# Patient Record
Sex: MALE | Race: WHITE | NOT HISPANIC OR LATINO | Employment: OTHER | URBAN - METROPOLITAN AREA
[De-identification: names, ages, dates, MRNs, and addresses within clinical notes are randomized per-mention and may not be internally consistent; named-entity substitution may affect disease eponyms.]

---

## 2019-07-15 ENCOUNTER — HOSPITAL ENCOUNTER (OUTPATIENT)
Dept: FAMILY MEDICINE CLINIC | Facility: CLINIC | Age: 59
Discharge: HOME | End: 2019-07-15
Attending: NURSE PRACTITIONER

## 2019-07-15 ENCOUNTER — OFFICE VISIT CONVERTED (OUTPATIENT)
Dept: FAMILY MEDICINE CLINIC | Facility: CLINIC | Age: 59
End: 2019-07-15
Attending: NURSE PRACTITIONER

## 2019-07-16 LAB
ALBUMIN SERPL-MCNC: 4.3 G/DL (ref 3.5–5)
ALBUMIN/GLOB SERPL: 1.7 {RATIO} (ref 1.4–2.6)
ALP SERPL-CCNC: 60 U/L (ref 56–119)
ALT SERPL-CCNC: 11 U/L (ref 10–40)
ANION GAP SERPL CALC-SCNC: 18 MMOL/L (ref 8–19)
AST SERPL-CCNC: 14 U/L (ref 15–50)
BASOPHILS # BLD AUTO: 0.03 10*3/UL (ref 0–0.2)
BASOPHILS NFR BLD AUTO: 0.4 % (ref 0–3)
BILIRUB SERPL-MCNC: 0.28 MG/DL (ref 0.2–1.3)
BUN SERPL-MCNC: 17 MG/DL (ref 5–25)
BUN/CREAT SERPL: 19 {RATIO} (ref 6–20)
CALCIUM SERPL-MCNC: 9 MG/DL (ref 8.7–10.4)
CHLORIDE SERPL-SCNC: 102 MMOL/L (ref 99–111)
CONV ABS IMM GRAN: 0.01 10*3/UL (ref 0–0.2)
CONV CO2: 26 MMOL/L (ref 22–32)
CONV IMMATURE GRAN: 0.1 % (ref 0–1.8)
CONV TOTAL PROTEIN: 6.9 G/DL (ref 6.3–8.2)
CREAT UR-MCNC: 0.9 MG/DL (ref 0.7–1.2)
DEPRECATED RDW RBC AUTO: 40.5 FL (ref 35.1–43.9)
EOSINOPHIL # BLD AUTO: 0.27 10*3/UL (ref 0–0.7)
EOSINOPHIL # BLD AUTO: 4 % (ref 0–7)
ERYTHROCYTE [DISTWIDTH] IN BLOOD BY AUTOMATED COUNT: 11.9 % (ref 11.6–14.4)
FOLATE SERPL-MCNC: >20 NG/ML (ref 4.8–20)
GFR SERPLBLD BASED ON 1.73 SQ M-ARVRAT: >60 ML/MIN/{1.73_M2}
GLOBULIN UR ELPH-MCNC: 2.6 G/DL (ref 2–3.5)
GLUCOSE SERPL-MCNC: 92 MG/DL (ref 70–99)
HBA1C MFR BLD: 13.4 G/DL (ref 14–18)
HCT VFR BLD AUTO: 41.1 % (ref 42–52)
LYMPHOCYTES # BLD AUTO: 2.35 10*3/UL (ref 1–5)
MCH RBC QN AUTO: 30.1 PG (ref 27–31)
MCHC RBC AUTO-ENTMCNC: 32.6 G/DL (ref 33–37)
MCV RBC AUTO: 92.4 FL (ref 80–96)
MONOCYTES # BLD AUTO: 0.45 10*3/UL (ref 0.2–1.2)
MONOCYTES NFR BLD AUTO: 6.6 % (ref 3–10)
NEUTROPHILS # BLD AUTO: 3.69 10*3/UL (ref 2–8)
NEUTROPHILS NFR BLD AUTO: 54.3 % (ref 30–85)
NRBC CBCN: 0 % (ref 0–0.7)
OSMOLALITY SERPL CALC.SUM OF ELEC: 293 MOSM/KG (ref 273–304)
PLATELET # BLD AUTO: 239 10*3/UL (ref 130–400)
PMV BLD AUTO: 12.1 FL (ref 9.4–12.4)
POTASSIUM SERPL-SCNC: 4.7 MMOL/L (ref 3.5–5.3)
RBC # BLD AUTO: 4.45 10*6/UL (ref 4.7–6.1)
SODIUM SERPL-SCNC: 141 MMOL/L (ref 135–147)
T4 FREE SERPL-MCNC: 1.4 NG/DL (ref 0.9–1.8)
TSH SERPL-ACNC: 1.98 M[IU]/L (ref 0.27–4.2)
VARIANT LYMPHS NFR BLD MANUAL: 34.6 % (ref 20–45)
VIT B12 SERPL-MCNC: 978 PG/ML (ref 211–911)
WBC # BLD AUTO: 6.8 10*3/UL (ref 4.8–10.8)

## 2019-07-17 LAB — B BURGDOR IGG+IGM SER-ACNC: <0.91 ISR (ref 0–0.9)

## 2019-07-18 LAB
R RICKETTSI IGG SER QL IA: NORMAL
R RICKETTSI IGG SER QL IA: POSITIVE

## 2019-07-19 LAB — R RICKETTSI IGM TITR SER: 0.33 INDEX (ref 0–0.89)

## 2019-07-21 LAB
B MICROTI DNA BLD QL NAA+PROBE: NOT DETECTED
CONV ANAPLASMA PHAGOCYTOPHILUM PCR: NOT DETECTED
CONV BABESIA SPECIES PCR: NOT DETECTED
CONV EHRLICHIA EWINGII CANIS PCR: NOT DETECTED
CONV EHRLICHIA MURIS LIKE PCR: NOT DETECTED
E CHAFFEENSIS DNA BLD QL NAA+PROBE: NOT DETECTED

## 2019-09-25 ENCOUNTER — HOSPITAL ENCOUNTER (OUTPATIENT)
Dept: FAMILY MEDICINE CLINIC | Facility: CLINIC | Age: 59
Discharge: HOME | End: 2019-09-25
Attending: NURSE PRACTITIONER

## 2019-09-25 ENCOUNTER — OFFICE VISIT CONVERTED (OUTPATIENT)
Dept: FAMILY MEDICINE CLINIC | Facility: CLINIC | Age: 59
End: 2019-09-25
Attending: NURSE PRACTITIONER

## 2019-09-25 LAB
25(OH)D3 SERPL-MCNC: 52.8 NG/ML (ref 30–100)
ALBUMIN SERPL-MCNC: 5 G/DL (ref 3.5–5)
ALBUMIN/GLOB SERPL: 1.7 {RATIO} (ref 1.4–2.6)
ALP SERPL-CCNC: 72 U/L (ref 56–119)
ALT SERPL-CCNC: 16 U/L (ref 10–40)
ANION GAP SERPL CALC-SCNC: 24 MMOL/L (ref 8–19)
AST SERPL-CCNC: 20 U/L (ref 15–50)
BASOPHILS # BLD AUTO: 0.03 10*3/UL (ref 0–0.2)
BASOPHILS NFR BLD AUTO: 0.4 % (ref 0–3)
BILIRUB SERPL-MCNC: 0.33 MG/DL (ref 0.2–1.3)
BUN SERPL-MCNC: 14 MG/DL (ref 5–25)
BUN/CREAT SERPL: 11 {RATIO} (ref 6–20)
CALCIUM SERPL-MCNC: 10.1 MG/DL (ref 8.7–10.4)
CHLORIDE SERPL-SCNC: 100 MMOL/L (ref 99–111)
CONV ABS IMM GRAN: 0.01 10*3/UL (ref 0–0.2)
CONV CO2: 20 MMOL/L (ref 22–32)
CONV IMMATURE GRAN: 0.1 % (ref 0–1.8)
CONV RHEUMATOID FACTOR IGM: <10 [IU]/ML (ref 0–14)
CONV TOTAL PROTEIN: 7.9 G/DL (ref 6.3–8.2)
CREAT UR-MCNC: 1.25 MG/DL (ref 0.7–1.2)
DEPRECATED RDW RBC AUTO: 37.5 FL (ref 35.1–43.9)
EOSINOPHIL # BLD AUTO: 0.13 10*3/UL (ref 0–0.7)
EOSINOPHIL # BLD AUTO: 1.9 % (ref 0–7)
ERYTHROCYTE [DISTWIDTH] IN BLOOD BY AUTOMATED COUNT: 11.5 % (ref 11.6–14.4)
EST. AVERAGE GLUCOSE BLD GHB EST-MCNC: 108 MG/DL
FERRITIN SERPL-MCNC: 249 NG/ML (ref 30–300)
FOLATE SERPL-MCNC: >20 NG/ML (ref 4.8–20)
GFR SERPLBLD BASED ON 1.73 SQ M-ARVRAT: >60 ML/MIN/{1.73_M2}
GLOBULIN UR ELPH-MCNC: 2.9 G/DL (ref 2–3.5)
GLUCOSE SERPL-MCNC: 100 MG/DL (ref 70–99)
HBA1C MFR BLD: 5.4 % (ref 3.5–5.7)
HCT VFR BLD AUTO: 43.4 % (ref 42–52)
HGB BLD-MCNC: 14.4 G/DL (ref 14–18)
IRON SATN MFR SERPL: 30 % (ref 20–55)
IRON SERPL-MCNC: 108 UG/DL (ref 70–180)
LYMPHOCYTES # BLD AUTO: 2.06 10*3/UL (ref 1–5)
LYMPHOCYTES NFR BLD AUTO: 29.4 % (ref 20–45)
MCH RBC QN AUTO: 29.7 PG (ref 27–31)
MCHC RBC AUTO-ENTMCNC: 33.2 G/DL (ref 33–37)
MCV RBC AUTO: 89.5 FL (ref 80–96)
MONOCYTES # BLD AUTO: 0.35 10*3/UL (ref 0.2–1.2)
MONOCYTES NFR BLD AUTO: 5 % (ref 3–10)
NEUTROPHILS # BLD AUTO: 4.43 10*3/UL (ref 2–8)
NEUTROPHILS NFR BLD AUTO: 63.2 % (ref 30–85)
NRBC CBCN: 0 % (ref 0–0.7)
OSMOLALITY SERPL CALC.SUM OF ELEC: 289 MOSM/KG (ref 273–304)
PLATELET # BLD AUTO: 255 10*3/UL (ref 130–400)
PMV BLD AUTO: 11.5 FL (ref 9.4–12.4)
POTASSIUM SERPL-SCNC: 4.8 MMOL/L (ref 3.5–5.3)
PSA SERPL-MCNC: 0.65 NG/ML (ref 0–4)
RBC # BLD AUTO: 4.85 10*6/UL (ref 4.7–6.1)
SODIUM SERPL-SCNC: 139 MMOL/L (ref 135–147)
TESTOST SERPL-MCNC: 456 NG/DL (ref 193–740)
TIBC SERPL-MCNC: 356 UG/DL (ref 245–450)
TRANSFERRIN SERPL-MCNC: 249 MG/DL (ref 215–365)
TSH SERPL-ACNC: 1.95 M[IU]/L (ref 0.27–4.2)
VIT B12 SERPL-MCNC: 798 PG/ML (ref 211–911)
WBC # BLD AUTO: 7.01 10*3/UL (ref 4.8–10.8)

## 2019-09-26 LAB
CONV ANTI NUCLEAR ANTIBODY WITH REFLEX: NEGATIVE
HCV AB SER DONR QL: <0.1 S/CO RATIO (ref 0–0.9)

## 2019-09-28 LAB
CONV EBV EARLY ANTIGEN: 10.9 U/ML (ref 0–10.9)
CONV EBV NUCLEAR ANTIGEN: >600 U/ML (ref 0–21.9)
CONV EPSTEIN BARR VIRAL CAPSID IGM: <10 U/ML (ref 0–43.9)
CONV EPSTEIN BARR VIRUS ANTIBODY TO VIRAL CAPSID IGG: 320 U/ML (ref 0–21.9)

## 2019-09-30 ENCOUNTER — HOSPITAL ENCOUNTER (OUTPATIENT)
Dept: FAMILY MEDICINE CLINIC | Facility: CLINIC | Age: 59
Discharge: HOME | End: 2019-09-30
Attending: NURSE PRACTITIONER

## 2019-09-30 ENCOUNTER — OFFICE VISIT CONVERTED (OUTPATIENT)
Dept: FAMILY MEDICINE CLINIC | Facility: CLINIC | Age: 59
End: 2019-09-30
Attending: NURSE PRACTITIONER

## 2019-09-30 LAB — URATE SERPL-MCNC: 6.5 MG/DL (ref 3.5–8.5)

## 2019-10-11 ENCOUNTER — HOSPITAL ENCOUNTER (OUTPATIENT)
Dept: LAB | Facility: HOSPITAL | Age: 59
Discharge: HOME | End: 2019-10-11
Attending: SPECIALIST

## 2019-10-11 LAB
ALBUMIN SERPL-MCNC: 4.6 G/DL (ref 3.5–5)
ALBUMIN/GLOB SERPL: 1.8 {RATIO} (ref 1.4–2.6)
ALP SERPL-CCNC: 63 U/L (ref 56–119)
ALT SERPL-CCNC: 15 U/L (ref 10–40)
ANION GAP SERPL CALC-SCNC: 17 MMOL/L (ref 8–19)
AST SERPL-CCNC: 17 U/L (ref 15–50)
BILIRUB SERPL-MCNC: 0.6 MG/DL (ref 0.2–1.3)
BUN SERPL-MCNC: 13 MG/DL (ref 5–25)
BUN/CREAT SERPL: 13 {RATIO} (ref 6–20)
CALCIUM SERPL-MCNC: 9.6 MG/DL (ref 8.7–10.4)
CHLORIDE SERPL-SCNC: 103 MMOL/L (ref 99–111)
CHOLEST SERPL-MCNC: 159 MG/DL (ref 107–200)
CHOLEST/HDLC SERPL: 2.9 {RATIO} (ref 3–6)
CONV CO2: 25 MMOL/L (ref 22–32)
CONV TOTAL PROTEIN: 7.2 G/DL (ref 6.3–8.2)
CREAT UR-MCNC: 0.99 MG/DL (ref 0.7–1.2)
GFR SERPLBLD BASED ON 1.73 SQ M-ARVRAT: >60 ML/MIN/{1.73_M2}
GLOBULIN UR ELPH-MCNC: 2.6 G/DL (ref 2–3.5)
GLUCOSE SERPL-MCNC: 97 MG/DL (ref 70–99)
HDLC SERPL-MCNC: 54 MG/DL (ref 40–60)
LDLC SERPL CALC-MCNC: 87 MG/DL (ref 70–100)
OSMOLALITY SERPL CALC.SUM OF ELEC: 290 MOSM/KG (ref 273–304)
POTASSIUM SERPL-SCNC: 4.7 MMOL/L (ref 3.5–5.3)
SODIUM SERPL-SCNC: 140 MMOL/L (ref 135–147)
TRIGL SERPL-MCNC: 88 MG/DL (ref 40–150)
VLDLC SERPL-MCNC: 18 MG/DL (ref 5–37)

## 2019-10-24 ENCOUNTER — OFFICE VISIT CONVERTED (OUTPATIENT)
Dept: FAMILY MEDICINE CLINIC | Facility: CLINIC | Age: 59
End: 2019-10-24
Attending: NURSE PRACTITIONER

## 2021-05-09 VITALS
OXYGEN SATURATION: 96 % | BODY MASS INDEX: 26.44 KG/M2 | HEIGHT: 66 IN | WEIGHT: 164.5 LBS | HEART RATE: 91 BPM | SYSTOLIC BLOOD PRESSURE: 132 MMHG | TEMPERATURE: 98.2 F | DIASTOLIC BLOOD PRESSURE: 84 MMHG

## 2021-05-09 VITALS
OXYGEN SATURATION: 100 % | WEIGHT: 168.06 LBS | HEART RATE: 77 BPM | DIASTOLIC BLOOD PRESSURE: 70 MMHG | BODY MASS INDEX: 27.01 KG/M2 | SYSTOLIC BLOOD PRESSURE: 140 MMHG | TEMPERATURE: 97.8 F | HEIGHT: 66 IN

## 2021-05-09 VITALS
DIASTOLIC BLOOD PRESSURE: 80 MMHG | HEART RATE: 88 BPM | SYSTOLIC BLOOD PRESSURE: 120 MMHG | OXYGEN SATURATION: 95 % | TEMPERATURE: 98.2 F | WEIGHT: 166 LBS

## 2021-05-09 VITALS
TEMPERATURE: 97.7 F | SYSTOLIC BLOOD PRESSURE: 138 MMHG | OXYGEN SATURATION: 97 % | HEART RATE: 83 BPM | WEIGHT: 168.31 LBS | DIASTOLIC BLOOD PRESSURE: 86 MMHG

## 2022-11-03 PROCEDURE — 99284 EMERGENCY DEPT VISIT MOD MDM: CPT

## 2022-11-04 ENCOUNTER — HOSPITAL ENCOUNTER (EMERGENCY)
Facility: HOSPITAL | Age: 62
Discharge: HOME | End: 2022-11-04
Attending: EMERGENCY MEDICINE | Admitting: EMERGENCY MEDICINE

## 2022-11-04 VITALS
WEIGHT: 168.87 LBS | RESPIRATION RATE: 18 BRPM | OXYGEN SATURATION: 96 % | TEMPERATURE: 98.1 F | DIASTOLIC BLOOD PRESSURE: 89 MMHG | BODY MASS INDEX: 25.01 KG/M2 | HEART RATE: 85 BPM | SYSTOLIC BLOOD PRESSURE: 151 MMHG | HEIGHT: 69 IN

## 2022-11-04 DIAGNOSIS — M54.31 SCIATICA OF RIGHT SIDE: Primary | ICD-10-CM

## 2022-11-04 LAB
ALBUMIN SERPL-MCNC: 4.8 G/DL (ref 3.5–5.2)
ALBUMIN/GLOB SERPL: 1.7 G/DL
ALP SERPL-CCNC: 65 U/L (ref 39–117)
ALT SERPL W P-5'-P-CCNC: 15 U/L (ref 1–41)
ANION GAP SERPL CALCULATED.3IONS-SCNC: 12.6 MMOL/L (ref 5–15)
AST SERPL-CCNC: 18 U/L (ref 1–40)
BASOPHILS # BLD AUTO: 0.02 10*3/MM3 (ref 0–0.2)
BASOPHILS NFR BLD AUTO: 0.3 % (ref 0–1.5)
BILIRUB SERPL-MCNC: 0.4 MG/DL (ref 0–1.2)
BUN SERPL-MCNC: 13 MG/DL (ref 8–23)
BUN/CREAT SERPL: 11 (ref 7–25)
CALCIUM SPEC-SCNC: 9.4 MG/DL (ref 8.6–10.5)
CHLORIDE SERPL-SCNC: 102 MMOL/L (ref 98–107)
CO2 SERPL-SCNC: 21.4 MMOL/L (ref 22–29)
CREAT SERPL-MCNC: 1.18 MG/DL (ref 0.76–1.27)
DEPRECATED RDW RBC AUTO: 36.2 FL (ref 37–54)
EGFRCR SERPLBLD CKD-EPI 2021: 70.2 ML/MIN/1.73
EOSINOPHIL # BLD AUTO: 0.11 10*3/MM3 (ref 0–0.4)
EOSINOPHIL NFR BLD AUTO: 1.6 % (ref 0.3–6.2)
ERYTHROCYTE [DISTWIDTH] IN BLOOD BY AUTOMATED COUNT: 11.7 % (ref 12.3–15.4)
GLOBULIN UR ELPH-MCNC: 2.8 GM/DL
GLUCOSE SERPL-MCNC: 117 MG/DL (ref 65–99)
HCT VFR BLD AUTO: 39.4 % (ref 37.5–51)
HGB BLD-MCNC: 13.9 G/DL (ref 13–17.7)
IMM GRANULOCYTES # BLD AUTO: 0.02 10*3/MM3 (ref 0–0.05)
IMM GRANULOCYTES NFR BLD AUTO: 0.3 % (ref 0–0.5)
LYMPHOCYTES # BLD AUTO: 1.66 10*3/MM3 (ref 0.7–3.1)
LYMPHOCYTES NFR BLD AUTO: 24 % (ref 19.6–45.3)
MCH RBC QN AUTO: 30.2 PG (ref 26.6–33)
MCHC RBC AUTO-ENTMCNC: 35.3 G/DL (ref 31.5–35.7)
MCV RBC AUTO: 85.7 FL (ref 79–97)
MONOCYTES # BLD AUTO: 0.33 10*3/MM3 (ref 0.1–0.9)
MONOCYTES NFR BLD AUTO: 4.8 % (ref 5–12)
NEUTROPHILS NFR BLD AUTO: 4.79 10*3/MM3 (ref 1.7–7)
NEUTROPHILS NFR BLD AUTO: 69 % (ref 42.7–76)
NRBC BLD AUTO-RTO: 0 /100 WBC (ref 0–0.2)
PLATELET # BLD AUTO: 276 10*3/MM3 (ref 140–450)
PMV BLD AUTO: 10.2 FL (ref 6–12)
POTASSIUM SERPL-SCNC: 4 MMOL/L (ref 3.5–5.2)
PROT SERPL-MCNC: 7.6 G/DL (ref 6–8.5)
RBC # BLD AUTO: 4.6 10*6/MM3 (ref 4.14–5.8)
SODIUM SERPL-SCNC: 136 MMOL/L (ref 136–145)
WBC NRBC COR # BLD: 6.93 10*3/MM3 (ref 3.4–10.8)

## 2022-11-04 PROCEDURE — 80053 COMPREHEN METABOLIC PANEL: CPT | Performed by: EMERGENCY MEDICINE

## 2022-11-04 PROCEDURE — 36415 COLL VENOUS BLD VENIPUNCTURE: CPT | Performed by: EMERGENCY MEDICINE

## 2022-11-04 PROCEDURE — 85025 COMPLETE CBC W/AUTO DIFF WBC: CPT

## 2022-11-04 RX ORDER — HYDROCODONE BITARTRATE AND ACETAMINOPHEN 7.5; 325 MG/1; MG/1
1 TABLET ORAL EVERY 4 HOURS PRN
Qty: 12 TABLET | Refills: 0 | OUTPATIENT
Start: 2022-11-04 | End: 2023-01-07

## 2022-11-04 RX ORDER — HYDROCODONE BITARTRATE AND ACETAMINOPHEN 7.5; 325 MG/1; MG/1
1 TABLET ORAL ONCE
Status: COMPLETED | OUTPATIENT
Start: 2022-11-04 | End: 2022-11-04

## 2022-11-04 RX ORDER — PREDNISONE 50 MG/1
50 TABLET ORAL DAILY
Qty: 5 TABLET | Refills: 0 | Status: SHIPPED | OUTPATIENT
Start: 2022-11-04 | End: 2023-01-19

## 2022-11-04 RX ORDER — ASPIRIN 325 MG
325 TABLET ORAL DAILY
COMMUNITY
End: 2022-11-07

## 2022-11-04 RX ADMIN — HYDROCODONE BITARTRATE AND ACETAMINOPHEN 1 TABLET: 7.5; 325 TABLET ORAL at 03:05

## 2022-11-04 NOTE — ED PROVIDER NOTES
Time: 1:53 AM EDT    Chief Complaint: leg pain    History of Present Illness:  Patient is a 62 y.o. year old male that presents to the emergency department with leg pain.     Pt has had back pain that radiates down his Left and Right leg for 2 weeks. He says he cannot sleep and has trouble walking. He says coldness helps his pain.     Pt is Right handed. He also has a laceration on his Left index finger that happened today. Pt put Betadine, antibiotic cream, and a bandaid on the wound.     Pt has been seen in the ED in [place] on 10/24 and 10/26. Pt has taken Ibuprofen since.    He has not been diagnosed with sciatica before. Pt has a history of anxiety. Pt has a history of leg problems in both of his legs. Pt has history of staph infections.           Similar Symptoms Previously: No  Recently seen: No      Patient Care Team  Primary Care Provider: Valeria Avendano APRN    Past Medical History:     No Known Allergies  No past medical history on file.  No past surgical history on file.  No family history on file.    Home Medications:  Prior to Admission medications    Medication Sig Start Date End Date Taking? Authorizing Provider   aspirin 325 MG tablet Take 1 tablet by mouth Daily.    Provider, MD Santi        Social History:        Record Review:  I have reviewed the patient's records in [facility] [employer].     Review of Systems:  Review of Systems   Constitutional: Negative for chills and fever.   HENT: Negative for congestion, rhinorrhea and sore throat.    Eyes: Negative for pain and visual disturbance.   Respiratory: Negative for apnea, cough, chest tightness and shortness of breath.    Cardiovascular: Negative for chest pain and palpitations.   Gastrointestinal: Negative for abdominal pain, diarrhea, nausea and vomiting.   Genitourinary: Negative for difficulty urinating and dysuria.   Musculoskeletal: Positive for back pain and gait problem. Negative for joint swelling.   Skin: Positive for wound. Negative for  "color change.   Neurological: Negative for seizures and headaches.   Psychiatric/Behavioral: Positive for sleep disturbance.   All other systems reviewed and are negative.       Physical Exam:  /89 (Patient Position: Sitting)   Pulse 85   Temp 98.1 °F (36.7 °C) (Oral)   Resp 18   Ht 175.3 cm (69\")   Wt 76.6 kg (168 lb 14 oz)   SpO2 96%   BMI 24.94 kg/m²     Physical Exam  Vitals and nursing note reviewed.   Constitutional:       General: He is not in acute distress.     Appearance: Normal appearance. He is not toxic-appearing.   HENT:      Head: Normocephalic and atraumatic.      Jaw: There is normal jaw occlusion.   Eyes:      General: Lids are normal.      Extraocular Movements: Extraocular movements intact.      Conjunctiva/sclera: Conjunctivae normal.      Pupils: Pupils are equal, round, and reactive to light.   Cardiovascular:      Rate and Rhythm: Normal rate and regular rhythm.      Pulses: Normal pulses.      Heart sounds: Normal heart sounds.   Pulmonary:      Effort: Pulmonary effort is normal. No respiratory distress.      Breath sounds: Normal breath sounds. No wheezing or rhonchi.   Abdominal:      General: Abdomen is flat.      Palpations: Abdomen is soft.      Tenderness: There is no abdominal tenderness. There is no guarding or rebound.   Musculoskeletal:         General: Normal range of motion.      Left hand: Laceration present.      Cervical back: Normal range of motion and neck supple.      Right lower leg: No edema.      Left lower leg: No edema.      Comments: Pt is Right handed. Pt has small proximated laceration to his Left index finger.   Skin:     General: Skin is warm and dry.   Neurological:      Mental Status: He is alert and oriented to person, place, and time. Mental status is at baseline.   Psychiatric:         Mood and Affect: Mood normal.                    Medications in the Emergency Department:  Medications   HYDROcodone-acetaminophen (NORCO) 7.5-325 MG per tablet 1 " tablet (1 tablet Oral Given 11/4/22 0305)        Labs  Lab Results (last 24 hours)     Procedure Component Value Units Date/Time    CBC & Differential [[id number]]  (Abnormal) Collected: 11/04/22 0028    Specimen: Blood Updated: 11/04/22 0051    Narrative:      The following orders were created for panel order CBC & Differential.  Procedure                               Abnormality         Status                     ---------                               -----------         ------                     CBC Auto Differential[[id number]]        Abnormal            Final result                 Please view results for these tests on the individual orders.    Comprehensive Metabolic Panel [[id number]]  (Abnormal) Collected: 11/04/22 0028    Specimen: Blood Updated: 11/04/22 0109     Glucose 117 mg/dL      BUN 13 mg/dL      Creatinine 1.18 mg/dL      Sodium 136 mmol/L      Potassium 4.0 mmol/L      Chloride 102 mmol/L      CO2 21.4 mmol/L      Calcium 9.4 mg/dL      Total Protein 7.6 g/dL      Albumin 4.80 g/dL      ALT (SGPT) 15 U/L      AST (SGOT) 18 U/L      Alkaline Phosphatase 65 U/L      Total Bilirubin 0.4 mg/dL      Globulin 2.8 gm/dL      A/G Ratio 1.7 g/dL      BUN/Creatinine Ratio 11.0     Anion Gap 12.6 mmol/L      eGFR 70.2 mL/min/1.73      Comment: National Kidney Foundation and American Society of Nephrology (ASN) Task Force recommended calculation based on the Chronic Kidney Disease Epidemiology Collaboration (CKD-EPI) equation refit without adjustment for race.       Narrative:      GFR Normal >60  Chronic Kidney Disease <60  Kidney Failure <15      CBC Auto Differential [[id number]]  (Abnormal) Collected: 11/04/22 0028    Specimen: Blood Updated: 11/04/22 0051     WBC 6.93 10*3/mm3      RBC 4.60 10*6/mm3      Hemoglobin 13.9 g/dL      Hematocrit 39.4 %      MCV 85.7 fL      MCH 30.2 pg      MCHC 35.3 g/dL      RDW 11.7 %      RDW-SD 36.2 fl      MPV 10.2 fL      Platelets 276 10*3/mm3      Neutrophil % 69.0 %       Lymphocyte % 24.0 %      Monocyte % 4.8 %      Eosinophil % 1.6 %      Basophil % 0.3 %      Immature Grans % 0.3 %      Neutrophils, Absolute 4.79 10*3/mm3      Lymphocytes, Absolute 1.66 10*3/mm3      Monocytes, Absolute 0.33 10*3/mm3      Eosinophils, Absolute 0.11 10*3/mm3      Basophils, Absolute 0.02 10*3/mm3      Immature Grans, Absolute 0.02 10*3/mm3      nRBC 0.0 /100 WBC            Imaging:  No Radiology Exams Resulted Within Past 24 Hours    Procedures:  Laceration Repair    Date/Time: 11/4/2022 6:51 AM  Performed by: Howie Hamilton MD  Authorized by: Howie Hamilton MD     Consent:     Consent obtained:  Verbal    Consent given by:  Patient  Universal protocol:     Patient identity confirmed:  Verbally with patient  Anesthesia:     Anesthesia method:  None  Laceration details:     Location:  Finger    Finger location:  L index finger    Length (cm):  2  Pre-procedure details:     Preparation:  Patient was prepped and draped in usual sterile fashion  Treatment:     Irrigation solution:  Sterile saline    Irrigation method:  Pressure wash    Visualized foreign bodies/material removed: no      Debridement:  None    Undermining:  None    Scar revision: no    Skin repair:     Repair method:  Steri-Strips    Number of Steri-Strips:  3  Approximation:     Approximation:  Close  Repair type:     Repair type:  Simple  Post-procedure details:     Dressing:  Open (no dressing)    Procedure completion:  Tolerated well, no immediate complications        Progress                            Medical Decision Making:  MDM     Patient's right leg pain most likely related to sciatic nerve compression.  We discussed use of anti-inflammatories pain medication.  We discussed return precautions including worsening symptoms or any additional concerns.    Final diagnoses:   Sciatica of right side        Disposition:  ED Disposition     ED Disposition   Discharge    Condition   Stable    Comment   --             Dictated  Utilizing Dragon Dictation    Documentation assistance provided by James Castellanos acting as scribe for Howie Hamilton MD. Information recorded by the scribe was done at my direction and has been verified and validated by me.        James Castellanos  11/04/22 0220       Howei Hamilton MD  11/04/22 2037

## 2022-11-05 ENCOUNTER — APPOINTMENT (OUTPATIENT)
Dept: GENERAL RADIOLOGY | Facility: HOSPITAL | Age: 62
End: 2022-11-05

## 2022-11-05 ENCOUNTER — HOSPITAL ENCOUNTER (EMERGENCY)
Facility: HOSPITAL | Age: 62
Discharge: HOME | End: 2022-11-05
Attending: EMERGENCY MEDICINE | Admitting: EMERGENCY MEDICINE

## 2022-11-05 VITALS
HEIGHT: 69 IN | TEMPERATURE: 98.7 F | RESPIRATION RATE: 20 BRPM | BODY MASS INDEX: 24.88 KG/M2 | DIASTOLIC BLOOD PRESSURE: 92 MMHG | SYSTOLIC BLOOD PRESSURE: 139 MMHG | OXYGEN SATURATION: 94 % | WEIGHT: 168 LBS | HEART RATE: 89 BPM

## 2022-11-05 DIAGNOSIS — K29.70 GASTRITIS WITHOUT BLEEDING, UNSPECIFIED CHRONICITY, UNSPECIFIED GASTRITIS TYPE: Primary | ICD-10-CM

## 2022-11-05 LAB
ALBUMIN SERPL-MCNC: 4.9 G/DL (ref 3.5–5.2)
ALBUMIN/GLOB SERPL: 2.1 G/DL
ALP SERPL-CCNC: 59 U/L (ref 39–117)
ALT SERPL W P-5'-P-CCNC: 18 U/L (ref 1–41)
ANION GAP SERPL CALCULATED.3IONS-SCNC: 10.7 MMOL/L (ref 5–15)
AST SERPL-CCNC: 17 U/L (ref 1–40)
BASOPHILS # BLD AUTO: 0.02 10*3/MM3 (ref 0–0.2)
BASOPHILS NFR BLD AUTO: 0.2 % (ref 0–1.5)
BILIRUB SERPL-MCNC: 0.4 MG/DL (ref 0–1.2)
BUN SERPL-MCNC: 9 MG/DL (ref 8–23)
BUN/CREAT SERPL: 8.2 (ref 7–25)
CALCIUM SPEC-SCNC: 10 MG/DL (ref 8.6–10.5)
CHLORIDE SERPL-SCNC: 103 MMOL/L (ref 98–107)
CO2 SERPL-SCNC: 26.3 MMOL/L (ref 22–29)
CREAT SERPL-MCNC: 1.1 MG/DL (ref 0.76–1.27)
DEPRECATED RDW RBC AUTO: 38.5 FL (ref 37–54)
EGFRCR SERPLBLD CKD-EPI 2021: 76.4 ML/MIN/1.73
EOSINOPHIL # BLD AUTO: 0.05 10*3/MM3 (ref 0–0.4)
EOSINOPHIL NFR BLD AUTO: 0.6 % (ref 0.3–6.2)
ERYTHROCYTE [DISTWIDTH] IN BLOOD BY AUTOMATED COUNT: 11.9 % (ref 12.3–15.4)
GLOBULIN UR ELPH-MCNC: 2.3 GM/DL
GLUCOSE SERPL-MCNC: 108 MG/DL (ref 65–99)
HCT VFR BLD AUTO: 40.4 % (ref 37.5–51)
HGB BLD-MCNC: 14 G/DL (ref 13–17.7)
HOLD SPECIMEN: NORMAL
HOLD SPECIMEN: NORMAL
IMM GRANULOCYTES # BLD AUTO: 0.02 10*3/MM3 (ref 0–0.05)
IMM GRANULOCYTES NFR BLD AUTO: 0.2 % (ref 0–0.5)
LIPASE SERPL-CCNC: 25 U/L (ref 13–60)
LYMPHOCYTES # BLD AUTO: 1.95 10*3/MM3 (ref 0.7–3.1)
LYMPHOCYTES NFR BLD AUTO: 23.9 % (ref 19.6–45.3)
MAGNESIUM SERPL-MCNC: 2 MG/DL (ref 1.6–2.4)
MCH RBC QN AUTO: 30.6 PG (ref 26.6–33)
MCHC RBC AUTO-ENTMCNC: 34.7 G/DL (ref 31.5–35.7)
MCV RBC AUTO: 88.4 FL (ref 79–97)
MONOCYTES # BLD AUTO: 0.63 10*3/MM3 (ref 0.1–0.9)
MONOCYTES NFR BLD AUTO: 7.7 % (ref 5–12)
NEUTROPHILS NFR BLD AUTO: 5.5 10*3/MM3 (ref 1.7–7)
NEUTROPHILS NFR BLD AUTO: 67.4 % (ref 42.7–76)
NRBC BLD AUTO-RTO: 0 /100 WBC (ref 0–0.2)
NT-PROBNP SERPL-MCNC: <36 PG/ML (ref 0–900)
PLATELET # BLD AUTO: 285 10*3/MM3 (ref 140–450)
PMV BLD AUTO: 10 FL (ref 6–12)
POTASSIUM SERPL-SCNC: 3.6 MMOL/L (ref 3.5–5.2)
PROT SERPL-MCNC: 7.2 G/DL (ref 6–8.5)
RBC # BLD AUTO: 4.57 10*6/MM3 (ref 4.14–5.8)
SODIUM SERPL-SCNC: 140 MMOL/L (ref 136–145)
TROPONIN I SERPL-MCNC: 0 NG/ML (ref 0–0.08)
TROPONIN I SERPL-MCNC: 0 NG/ML (ref 0–0.08)
WBC NRBC COR # BLD: 8.17 10*3/MM3 (ref 3.4–10.8)
WHOLE BLOOD HOLD COAG: NORMAL
WHOLE BLOOD HOLD SPECIMEN: NORMAL

## 2022-11-05 PROCEDURE — 83690 ASSAY OF LIPASE: CPT | Performed by: EMERGENCY MEDICINE

## 2022-11-05 PROCEDURE — 93010 ELECTROCARDIOGRAM REPORT: CPT | Performed by: INTERNAL MEDICINE

## 2022-11-05 PROCEDURE — 93005 ELECTROCARDIOGRAM TRACING: CPT | Performed by: EMERGENCY MEDICINE

## 2022-11-05 PROCEDURE — 85025 COMPLETE CBC W/AUTO DIFF WBC: CPT | Performed by: EMERGENCY MEDICINE

## 2022-11-05 PROCEDURE — 83735 ASSAY OF MAGNESIUM: CPT | Performed by: EMERGENCY MEDICINE

## 2022-11-05 PROCEDURE — 99284 EMERGENCY DEPT VISIT MOD MDM: CPT

## 2022-11-05 PROCEDURE — 74019 RADEX ABDOMEN 2 VIEWS: CPT

## 2022-11-05 PROCEDURE — 71045 X-RAY EXAM CHEST 1 VIEW: CPT

## 2022-11-05 PROCEDURE — 36415 COLL VENOUS BLD VENIPUNCTURE: CPT

## 2022-11-05 PROCEDURE — 84484 ASSAY OF TROPONIN QUANT: CPT

## 2022-11-05 PROCEDURE — 80053 COMPREHEN METABOLIC PANEL: CPT | Performed by: EMERGENCY MEDICINE

## 2022-11-05 PROCEDURE — 83880 ASSAY OF NATRIURETIC PEPTIDE: CPT | Performed by: EMERGENCY MEDICINE

## 2022-11-05 RX ORDER — PANTOPRAZOLE SODIUM 40 MG/1
40 TABLET, DELAYED RELEASE ORAL DAILY
Qty: 30 TABLET | Refills: 0 | Status: SHIPPED | OUTPATIENT
Start: 2022-11-05 | End: 2023-01-13 | Stop reason: SDUPTHER

## 2022-11-05 RX ORDER — SODIUM CHLORIDE 0.9 % (FLUSH) 0.9 %
10 SYRINGE (ML) INJECTION AS NEEDED
Status: DISCONTINUED | OUTPATIENT
Start: 2022-11-05 | End: 2022-11-05 | Stop reason: HOSPADM

## 2022-11-05 RX ORDER — ASPIRIN 81 MG/1
324 TABLET, CHEWABLE ORAL ONCE
Status: DISCONTINUED | OUTPATIENT
Start: 2022-11-05 | End: 2022-11-05 | Stop reason: HOSPADM

## 2022-11-05 RX ORDER — SULFAMETHOXAZOLE AND TRIMETHOPRIM 800; 160 MG/1; MG/1
1 TABLET ORAL 2 TIMES DAILY
Qty: 10 TABLET | Refills: 0 | Status: SHIPPED | OUTPATIENT
Start: 2022-11-05 | End: 2022-11-11 | Stop reason: SDUPTHER

## 2022-11-05 NOTE — ED PROVIDER NOTES
"Time: 11:06 AM EDT    Chief Complaint: Chest Pain  History provided by: Patient  History is limited by: N/A     History of Present Illness:  Patient is a 62 y.o. year old male who presents to the emergency department with chest pain.      Pt reports his chest pain began this morning around 3:25am, and the symptoms are intermittent. Pt states he also felt \"whoozy\".  Pt denies h/o heart problems, and last checked by his cardiologist in 2019.  Pt states he also had stomach has issues last night, including belching excessively and some mild abdominal discomfort.   Pt denied symptom of vomiting, but notes he has been constipated the last few days.  Pt reports he also has groin pain.       History provided by:  Patient   used: No    Chest Pain  Pain severity:  Moderate  Onset quality:  Sudden  Duration:  8 hours  Timing:  Intermittent  Progression:  Unchanged  Chronicity:  New  Associated symptoms: no abdominal pain, no cough, no fever, no headache, no nausea, no palpitations, no shortness of breath and no vomiting        Similar Symptoms Previously: No  Recently seen: Yes, 11/4/22      Patient Care Team  Primary Care Provider: Valeria Avendano APRN    Past Medical History:     No Known Allergies  No past medical history on file.  No past surgical history on file.  No family history on file.    Home Medications:  Prior to Admission medications    Medication Sig Start Date End Date Taking? Authorizing Provider   aspirin 325 MG tablet Take 1 tablet by mouth Daily.    Provider, MD Santi   HYDROcodone-acetaminophen (NORCO) 7.5-325 MG per tablet Take 1 tablet by mouth Every 4 (Four) Hours As Needed for Moderate Pain. 11/4/22   Howie Hamilton MD   predniSONE (DELTASONE) 50 MG tablet Take 1 tablet by mouth Daily. 11/4/22   Howie Hamilton MD        Social History:          Review of Systems:  Review of Systems   Constitutional: Negative for chills and fever.   HENT: Negative for congestion, " "rhinorrhea and sore throat.    Eyes: Negative for pain and visual disturbance.   Respiratory: Negative for apnea, cough, chest tightness and shortness of breath.    Cardiovascular: Positive for chest pain. Negative for palpitations.   Gastrointestinal: Positive for constipation. Negative for abdominal pain, diarrhea, nausea and vomiting.        Excessive belching   Genitourinary: Negative for difficulty urinating and dysuria.   Musculoskeletal: Negative for joint swelling and myalgias.   Skin: Negative for color change.   Neurological: Positive for light-headedness. Negative for seizures and headaches.   Psychiatric/Behavioral: Negative.    All other systems reviewed and are negative.       Physical Exam:  /95   Pulse 89   Temp 98.7 °F (37.1 °C) (Oral)   Resp 20   Ht 175.3 cm (69\")   Wt 76.2 kg (168 lb)   SpO2 94%   BMI 24.81 kg/m²     Physical Exam  Vitals and nursing note reviewed.   Constitutional:       General: He is not in acute distress.     Appearance: Normal appearance. He is not toxic-appearing.   HENT:      Head: Normocephalic and atraumatic.      Jaw: There is normal jaw occlusion.   Eyes:      General: Lids are normal.      Extraocular Movements: Extraocular movements intact.      Conjunctiva/sclera: Conjunctivae normal.      Pupils: Pupils are equal, round, and reactive to light.   Cardiovascular:      Rate and Rhythm: Normal rate and regular rhythm.      Pulses: Normal pulses.      Heart sounds: Normal heart sounds.   Pulmonary:      Effort: Pulmonary effort is normal. No respiratory distress.      Breath sounds: Normal breath sounds. No wheezing or rhonchi.   Abdominal:      General: Abdomen is flat.      Palpations: Abdomen is soft.      Tenderness: There is no abdominal tenderness. There is no guarding or rebound.   Musculoskeletal:         General: Normal range of motion.      Cervical back: Normal range of motion and neck supple.      Right lower leg: No edema.      Left lower leg: " No edema.   Skin:     General: Skin is warm and dry.   Neurological:      Mental Status: He is alert and oriented to person, place, and time. Mental status is at baseline.   Psychiatric:         Mood and Affect: Mood normal.                Medications in the Emergency Department:  Medications   sodium chloride 0.9 % flush 10 mL (has no administration in time range)   aspirin chewable tablet 324 mg (324 mg Oral Not Given 11/5/22 1316)        Labs  Lab Results (last 24 hours)     Procedure Component Value Units Date/Time    POC Troponin I [[id number]]  (Normal) Collected: 11/05/22 1102    Specimen: Blood Updated: 11/05/22 1114     Troponin I 0.00 ng/mL      Comment: Serial Number: [id number]:  [id number]       CBC & Differential [[id number]]  (Abnormal) Collected: 11/05/22 1104    Specimen: Blood Updated: 11/05/22 1118    Narrative:      The following orders were created for panel order CBC & Differential.  Procedure                               Abnormality         Status                     ---------                               -----------         ------                     CBC Auto Differential[[id number]]        Abnormal            Final result                 Please view results for these tests on the individual orders.    Comprehensive Metabolic Panel [[id number]]  (Abnormal) Collected: 11/05/22 1104    Specimen: Blood Updated: 11/05/22 1158     Glucose 108 mg/dL      BUN 9 mg/dL      Creatinine 1.10 mg/dL      Sodium 140 mmol/L      Potassium 3.6 mmol/L      Chloride 103 mmol/L      CO2 26.3 mmol/L      Calcium 10.0 mg/dL      Total Protein 7.2 g/dL      Albumin 4.90 g/dL      ALT (SGPT) 18 U/L      AST (SGOT) 17 U/L      Alkaline Phosphatase 59 U/L      Total Bilirubin 0.4 mg/dL      Globulin 2.3 gm/dL      A/G Ratio 2.1 g/dL      BUN/Creatinine Ratio 8.2     Anion Gap 10.7 mmol/L      eGFR 76.4 mL/min/1.73      Comment: National Kidney Foundation and American Society of Nephrology (ASN) Task Force recommended  calculation based on the Chronic Kidney Disease Epidemiology Collaboration (CKD-EPI) equation refit without adjustment for race.       Narrative:      GFR Normal >60  Chronic Kidney Disease <60  Kidney Failure <15      Lipase [[id number]]  (Normal) Collected: 11/05/22 1104    Specimen: Blood Updated: 11/05/22 1158     Lipase 25 U/L     BNP [[id number]]  (Normal) Collected: 11/05/22 1104    Specimen: Blood Updated: 11/05/22 1152     proBNP <36.0 pg/mL     Narrative:      Among patients with dyspnea, NT-proBNP is highly sensitive for the detection of acute congestive heart failure. In addition NT-proBNP of <300 pg/ml effectively rules out acute congestive heart failure with 99% negative predictive value.      Magnesium [[id number]]  (Normal) Collected: 11/05/22 1104    Specimen: Blood Updated: 11/05/22 1158     Magnesium 2.0 mg/dL     CBC Auto Differential [[id number]]  (Abnormal) Collected: 11/05/22 1104    Specimen: Blood Updated: 11/05/22 1118     WBC 8.17 10*3/mm3      RBC 4.57 10*6/mm3      Hemoglobin 14.0 g/dL      Hematocrit 40.4 %      MCV 88.4 fL      MCH 30.6 pg      MCHC 34.7 g/dL      RDW 11.9 %      RDW-SD 38.5 fl      MPV 10.0 fL      Platelets 285 10*3/mm3      Neutrophil % 67.4 %      Lymphocyte % 23.9 %      Monocyte % 7.7 %      Eosinophil % 0.6 %      Basophil % 0.2 %      Immature Grans % 0.2 %      Neutrophils, Absolute 5.50 10*3/mm3      Lymphocytes, Absolute 1.95 10*3/mm3      Monocytes, Absolute 0.63 10*3/mm3      Eosinophils, Absolute 0.05 10*3/mm3      Basophils, Absolute 0.02 10*3/mm3      Immature Grans, Absolute 0.02 10*3/mm3      nRBC 0.0 /100 WBC     POC Troponin I [[id number]]  (Normal) Collected: 11/05/22 1323    Specimen: Blood Updated: 11/05/22 1336     Troponin I 0.00 ng/mL      Comment: Serial Number: [id number]:  [id number]              Imaging:  XR Abdomen Flat & Upright    Result Date: 11/5/2022  PROCEDURE: XR ABDOMEN FLAT AND UPRIGHT  COMPARISON: None  INDICATIONS: LOWER ABDOMINAL  PAIN/BURNING, CONSTIPATION  FINDINGS:  There is no evidence of pneumoperitoneum.  The bowel gas pattern appears within normal limits.  No significant retained stool is seen.  No renal or ureteral stone is evident.          1. No acute abdominal pathology demonstrated     Nilson Sandoval M.D.       Electronically Signed and Approved By: Nilson Sandoval M.D. on 11/05/2022 at 13:12             XR Chest 1 View    Result Date: 11/5/2022  PROCEDURE: XR CHEST 1 VW  COMPARISON: None  INDICATIONS: Chest Pain Triage Protocol, soa, left arm numbness  FINDINGS:  The lungs are clear bilaterally.  The cardiac and mediastinal silhouettes appear normal.  No effusion is seen.        1. No acute cardiopulmonary disease.       Nilson Sandoval M.D.       Electronically Signed and Approved By: Nilson Sandoval M.D. on 11/05/2022 at 11:23               Procedures:  Procedures    Progress  ED Course as of 11/05/22 1411   Sat Nov 05, 2022   1409 EKG:    Rhythm: sinus  Rate: 91  Axis: normal  Intervals: normal  ST Segment: no elevations    EKG Comparison: no previous    Interpreted by me   [BN]      ED Course User Index  [BN] Alis Aguayo MD                            Medical Decision Making:  MDM  Number of Diagnoses or Management Options  Gastritis without bleeding, unspecified chronicity, unspecified gastritis type  Diagnosis management comments: The patient had an EKG that shows no acute changes. Specifically, there are no ST elevations, t-wave changes of concern, delta waves, or rhythm abnormalities warranting admission. The patient was placed on the cardiac monitor and observed with continuous telemetry. The patient has a chest x-ray interpreted by me that is negative for pneumothorax, pneumonia, and is essentially unremarkable. The patient has had unelevated troponins on blood draw.      The patient is resting comfortably and feels better, is alert and in no distress.  The patient´s CBC that was reviewed and interpreted by me shows no  abnormalities of critical concern. Of note, there is no anemia requiring a blood transfusion and the platelet count is acceptable.  The patient´s CMP that was reviewed and interpretted by me shows no abnormalities of critical concern. Of note, the patient´s sodium and potassium are acceptable. The patient´s liver enzymes are unremarkable. The patient´s renal function (creatinine) is preserved. The patient has a normal anion gap.  Troponin is negative.  X-ray of the abdomen is negative for air-fluid levels and dilated bowel.  Chest x-ray is negative for pneumonia.  All plain films were interpreted by me.  The repeat examination is unremarkable and benign. Electrocardiogram shows no signs of acute ischemia and the history, exam, diagnostic testing and current condition did not suggest that this patient is having an acute myocardial infarction, significant arrhythmia, unstable angina, esophageal perforation, pulmonary embolism, aortic dissection, severe pneumonia, sepsis for other significant pathology that would warrant further testing, continued ED treatment, admission, cardiology or other specialist consultation at this point. The vital signs have been stable. The patient's condition is stable and appropriate for discharge. The patient will pursue further outpatient evaluation with the primary care physician, or designated physician or cardiologist. The patient has expressed a clear and thorough understanding and agreed to follow-up as instructed.       Amount and/or Complexity of Data Reviewed  Clinical lab tests: reviewed  Tests in the radiology section of CPT®: reviewed  Tests in the medicine section of CPT®: reviewed  Independent visualization of images, tracings, or specimens: yes    Risk of Complications, Morbidity, and/or Mortality  Presenting problems: moderate  Management options: moderate    Patient Progress  Patient progress: stable       Final diagnoses:   Gastritis without bleeding, unspecified  chronicity, unspecified gastritis type        Disposition:  ED Disposition     ED Disposition   Discharge    Condition   Stable    Comment   --             This medical record created using voice recognition software.        Documentation assistance provided by Keysha Valles acting as scribe for Alis Aguayo MD. Information recorded by the scribe was done at my direction and has been verified and validated by me.          Keysha Valles  11/05/22 1120       Alis Aguayo MD  11/05/22 1412

## 2022-11-06 LAB — QT INTERVAL: 325 MS

## 2022-11-07 ENCOUNTER — OFFICE VISIT (OUTPATIENT)
Dept: FAMILY MEDICINE CLINIC | Facility: CLINIC | Age: 62
End: 2022-11-07

## 2022-11-07 VITALS
DIASTOLIC BLOOD PRESSURE: 84 MMHG | BODY MASS INDEX: 24.73 KG/M2 | HEART RATE: 88 BPM | WEIGHT: 167 LBS | HEIGHT: 69 IN | SYSTOLIC BLOOD PRESSURE: 144 MMHG | TEMPERATURE: 97.5 F | OXYGEN SATURATION: 96 %

## 2022-11-07 DIAGNOSIS — M54.16 LUMBAR RADICULOPATHY: ICD-10-CM

## 2022-11-07 DIAGNOSIS — Z09 ENCOUNTER FOR EXAMINATION FOLLOWING TREATMENT AT HOSPITAL: ICD-10-CM

## 2022-11-07 DIAGNOSIS — Z12.11 ENCOUNTER FOR SCREENING COLONOSCOPY: ICD-10-CM

## 2022-11-07 DIAGNOSIS — G89.29 CHRONIC LOW BACK PAIN, UNSPECIFIED BACK PAIN LATERALITY, UNSPECIFIED WHETHER SCIATICA PRESENT: ICD-10-CM

## 2022-11-07 DIAGNOSIS — K59.00 CONSTIPATION, UNSPECIFIED CONSTIPATION TYPE: ICD-10-CM

## 2022-11-07 DIAGNOSIS — E73.9 LACTOSE INTOLERANCE: ICD-10-CM

## 2022-11-07 DIAGNOSIS — Z76.89 ENCOUNTER TO ESTABLISH CARE: Primary | ICD-10-CM

## 2022-11-07 DIAGNOSIS — Z23 NEED FOR INFLUENZA VACCINATION: ICD-10-CM

## 2022-11-07 DIAGNOSIS — M54.50 CHRONIC LOW BACK PAIN, UNSPECIFIED BACK PAIN LATERALITY, UNSPECIFIED WHETHER SCIATICA PRESENT: ICD-10-CM

## 2022-11-07 DIAGNOSIS — M41.9 SCOLIOSIS OF LUMBAR SPINE, UNSPECIFIED SCOLIOSIS TYPE: ICD-10-CM

## 2022-11-07 PROCEDURE — 86003 ALLG SPEC IGE CRUDE XTRC EA: CPT | Performed by: NURSE PRACTITIONER

## 2022-11-07 PROCEDURE — 90686 IIV4 VACC NO PRSV 0.5 ML IM: CPT | Performed by: NURSE PRACTITIONER

## 2022-11-07 PROCEDURE — 86008 ALLG SPEC IGE RECOMB EA: CPT | Performed by: NURSE PRACTITIONER

## 2022-11-07 PROCEDURE — 90471 IMMUNIZATION ADMIN: CPT | Performed by: NURSE PRACTITIONER

## 2022-11-07 PROCEDURE — 99204 OFFICE O/P NEW MOD 45 MIN: CPT | Performed by: NURSE PRACTITIONER

## 2022-11-07 RX ORDER — BACITRACIN ZINC 500 [USP'U]/G
OINTMENT TOPICAL
COMMUNITY
Start: 2022-10-25 | End: 2023-03-07

## 2022-11-07 RX ORDER — GINSENG 100 MG
CAPSULE ORAL
COMMUNITY
Start: 2022-10-25 | End: 2022-11-07

## 2022-11-07 RX ORDER — IBUPROFEN 400 MG/1
TABLET ORAL
COMMUNITY
Start: 2022-10-25 | End: 2023-01-13

## 2022-11-07 RX ORDER — TIZANIDINE HYDROCHLORIDE 4 MG/1
4 CAPSULE, GELATIN COATED ORAL NIGHTLY PRN
Qty: 30 CAPSULE | Refills: 2 | Status: SHIPPED | OUTPATIENT
Start: 2022-11-07 | End: 2023-01-13

## 2022-11-07 RX ORDER — ACETAMINOPHEN 325 MG/1
TABLET ORAL
COMMUNITY
Start: 2022-10-25 | End: 2023-03-07

## 2022-11-07 RX ORDER — POLYETHYLENE GLYCOL 3350 17 G/17G
17 POWDER, FOR SOLUTION ORAL DAILY
Qty: 30 EACH | Refills: 2 | Status: SHIPPED | OUTPATIENT
Start: 2022-11-07 | End: 2023-01-30 | Stop reason: SDUPTHER

## 2022-11-07 RX ORDER — DOCUSATE SODIUM 100 MG/1
100 CAPSULE, LIQUID FILLED ORAL 2 TIMES DAILY PRN
Qty: 60 CAPSULE | Refills: 2 | Status: SHIPPED | OUTPATIENT
Start: 2022-11-07 | End: 2023-03-07

## 2022-11-07 RX ORDER — FERROUS SULFATE 325(65) MG
TABLET ORAL
COMMUNITY
End: 2022-11-07

## 2022-11-07 RX ORDER — ASPIRIN 325 MG
TABLET ORAL
COMMUNITY
End: 2022-11-07 | Stop reason: DRUGHIGH

## 2022-11-07 NOTE — PROGRESS NOTES
"Chief Complaint  Cold Extremity (Both legs get cold, left is worse then the right, he has had staff infection before.  Patient states that when he has an infection somewhere in his body, his legs really act up.  He does have burning, coldness, pain and this has been going on since 1998 but is getting worse and coming on more often.  )    Subjective            Yoni Aleman presents to [facility]  History of Present Illness  Patient was last seen here in our practice 10/24/2019 so technically is a new patient in our practice today    Was seen recently in the emergency room on 11/4/2022 and 11/5/2022 first time was for a laceration second time was for chest pain--and was given 2 days of norco from the ER dept     Patient reports today that he is here with regards to both legs get cold left worse than the right and that he has had a prior staph infection in the past and that normally his \"legs act up\" whenever he is having any type of a staph infection he reports burning and coldness and pain that has been ongoing since 1998 and slowly progressing and getting worse and coming on more frequently    Also reports that he was told in the emergency room may be possibly sciatica and needs further evaluation they did give him a steroid for this    Also patient reports that they gave him the choice of sutures or just the Steri-Strips and he chose Steri-Strips on the finger laceration but he declined the finger splint and patient reports he has been bending it it bust open and he believes so he does need that redressed and would like to have the splint now possible    Also patient reports that he is suffering with constipation and he has this intermittently and reports that he has not had a BM for 5 days and that they evaluated him in the ER couple days ago for this as well and there was no blockages but that he did not receive anything for constipation and he did try some over-the-counter " meds    And then patient also and his wife also report that they were concerned that he is lactose intolerant and would like to have blood work to check for any type of a dairy allergy    BP was modestly elevated lower than it was in the emergency room and this is related to the chronic low back pain and the finger laceration that he is concerned about and he will follow-up and we will monitor      PHQ-2 Total Score: 0  PHQ-9 Total Score: 0    History reviewed. No pertinent past medical history.    No Known Allergies     History reviewed. No pertinent surgical history.     Social History     Tobacco Use   • Smoking status: Former     Types: Cigarettes   • Smokeless tobacco: Never   Vaping Use   • Vaping Use: Never used   Substance Use Topics   • Alcohol use: Not Currently   • Drug use: Not Currently       History reviewed. No pertinent family history.     Health Maintenance Due   Topic Date Due   • COLORECTAL CANCER SCREENING  Never done   • ANNUAL PHYSICAL  Never done   • ZOSTER VACCINE (1 of 2) Never done   • COVID-19 Vaccine (4 - Booster) 03/25/2022        Current Outpatient Medications on File Prior to Visit   Medication Sig   • acetaminophen (TYLENOL) 325 MG tablet TAKE 2 TABLETS BY MOUTH EVERY 4 HOURS FOR 5 DAYS AS NEEDED FOR PAIN   • bacitracin 500 UNIT/GM ointment USE 1 APPLICATION TOPICALLY FOUR TIMES DAILY   • HYDROcodone-acetaminophen (NORCO) 7.5-325 MG per tablet Take 1 tablet by mouth Every 4 (Four) Hours As Needed for Moderate Pain.   • ibuprofen (ADVIL,MOTRIN) 400 MG tablet TAKE 1 TABLET BY MOUTH EVERY 6 HOURS FOR 5 DAYS AS NEEDED FOR PAIN   • pantoprazole (PROTONIX) 40 MG EC tablet Take 1 tablet by mouth Daily.   • predniSONE (DELTASONE) 50 MG tablet Take 1 tablet by mouth Daily.   • sulfamethoxazole-trimethoprim (BACTRIM DS,SEPTRA DS) 800-160 MG per tablet Take 1 tablet by mouth 2 (Two) Times a Day.   • [DISCONTINUED] aspirin 325 MG tablet Take 1 tablet by mouth Daily.   • [DISCONTINUED] aspirin 325  "MG tablet aspirin 325 mg oral tablet take 1 tablet (325 mg) by oral route once daily   Suspended   • [DISCONTINUED] bacitracin 500 UNIT/GM ointment Apply  topically to the appropriate area as directed.   • [DISCONTINUED] ferrous sulfate 325 (65 FE) MG tablet Iron (ferrous sulfate) 325 mg (65 mg iron) oral tablet take 1 tablet (325 mg) by oral route once daily   Active     No current facility-administered medications on file prior to visit.       Immunization History   Administered Date(s) Administered   • COVID-19 (UNSPECIFIED) 06/16/2021, 07/16/2021, 01/28/2022   • FluLaval/Fluzone >6mos 11/07/2022   • Fluzone Quad >6mos (Multi-dose) 12/06/2018, 10/24/2019, 12/17/2020   • Influenza, Unspecified 10/17/2017, 01/28/2022   • Tdap 02/05/2017       Review of Systems   Constitutional: Negative for fatigue.   HENT: Negative for trouble swallowing.    Eyes: Negative for blurred vision and double vision.   Respiratory: Negative for choking.    Cardiovascular: Positive for chest pain.        When was in the ER recently and pt was negative for everything cardiac    Gastrointestinal: Positive for constipation, GERD and indigestion. Negative for diarrhea, nausea and vomiting.        No BM in 5 days    Genitourinary: Negative for dysuria.   Musculoskeletal: Positive for arthralgias, back pain and myalgias.   Skin:        Has a recent laceration and seen in the ER dept    Neurological: Positive for numbness. Negative for dizziness and light-headedness.        Numbness burning tingling to the legs    Psychiatric/Behavioral: Negative for self-injury and suicidal ideas.        Objective     /84 (BP Location: Right arm, Patient Position: Sitting, Cuff Size: Adult)   Pulse 88   Temp 97.5 °F (36.4 °C) (Temporal)   Ht 175.3 cm (69\")   Wt 75.8 kg (167 lb)   SpO2 96%   BMI 24.66 kg/m²       Physical Exam  Vitals and nursing note reviewed. Exam conducted with a chaperone present (wife).   Constitutional:       Appearance: Normal " appearance.   HENT:      Head: Normocephalic.      Right Ear: External ear normal.      Left Ear: External ear normal.      Nose: Nose normal.      Mouth/Throat:      Comments: Wearing mask  Eyes:      Pupils: Pupils are equal, round, and reactive to light.   Cardiovascular:      Rate and Rhythm: Normal rate.   Pulmonary:      Effort: Pulmonary effort is normal.   Abdominal:      General: Bowel sounds are normal.      Palpations: Abdomen is soft.      Tenderness: There is no abdominal tenderness.   Musculoskeletal:         General: Tenderness present. No signs of injury.      Cervical back: Normal range of motion.      Lumbar back: Tenderness and bony tenderness present. Decreased range of motion. Positive right straight leg raise test and positive left straight leg raise test.   Skin:     General: Skin is warm.      Comments: Left index finger with the lateral aspect of the mid finger with a linear laceration and can see where steri-strips were placed and then the pt also with dried blood and you can see where he has been bending his finger no signs and symptoms of infection noted   Neurological:      Mental Status: He is alert and oriented to person, place, and time.   Psychiatric:         Mood and Affect: Mood normal.         Behavior: Behavior normal.         Thought Content: Thought content normal.         Judgment: Judgment normal.         Result Review :           EMERGENCY DEPART/UTC - SCAN - ER NOTES, [facility], 10/24/2022 (10/24/2022)  EMERGENCY DEPART/[facility] - SCAN - ER NOTES, [facility], 10/26/2022 (10/26/2022)    ED with Howie Hamilton MD (11/04/2022)    ED with Alis Aguayo MD (11/05/2022)  CBC & Differential (11/04/2022 00:28)  Comprehensive Metabolic Panel (11/04/2022 00:28)  POC Troponin I (11/05/2022 11:02)  POC Troponin I with Hold Tube (11/05/2022 11:04)  CBC & Differential (11/05/2022 11:04)  Comprehensive Metabolic Panel (11/05/2022 11:04)  Lipase (11/05/2022  11:04)  BNP (11/05/2022 11:04)  Magnesium (11/05/2022 11:04)  HOLD Troponin-I Tube (11/05/2022 11:04)  POC Troponin I with Hold Tube (11/05/2022 13:23)  HOLD Troponin-I Tube (11/05/2022 13:23)  POC Troponin I (11/05/2022 13:23)  XR Chest 1 View (11/05/2022 11:09)  XR Abdomen Flat & Upright (11/05/2022 12:45)  ECG 12 Lead ED Triage Standing Order; Chest Pain (11/05/2022 10:59)  Laceration Repair (11/04/2022 06:51)    XR Spine Lumbar 2 or 3 View (In Office) (11/07/2022 10:20)  Also reviewed the ER visits from [facility] twice last month            Assessment and Plan      Diagnoses and all orders for this visit:    1. Encounter to establish care (Primary)    2. Encounter for examination following treatment at hospital    3. Lumbar radiculopathy  -     XR Spine Lumbar 2 or 3 View (In Office)  -     Ambulatory Referral to Physical Therapy Evaluate and treat  -     TiZANidine (Zanaflex) 4 MG capsule; Take 1 capsule by mouth At Night As Needed for Muscle Spasms (back pain).  Dispense: 30 capsule; Refill: 2    4. Chronic low back pain, unspecified back pain laterality, unspecified whether sciatica present  -     XR Spine Lumbar 2 or 3 View (In Office)  -     Ambulatory Referral to Physical Therapy Evaluate and treat  -     TiZANidine (Zanaflex) 4 MG capsule; Take 1 capsule by mouth At Night As Needed for Muscle Spasms (back pain).  Dispense: 30 capsule; Refill: 2    5. Constipation, unspecified constipation type  -     polyethylene glycol (MIRALAX) 17 g packet; Take 17 g by mouth Daily.  Dispense: 30 each; Refill: 2  -     docusate sodium (Stool Softener) 100 MG capsule; Take 1 capsule by mouth 2 (Two) Times a Day As Needed for Constipation.  Dispense: 60 capsule; Refill: 2  -     Ambulatory Referral to General Surgery    6. Lactose intolerance  -     Allergens (9) Dairy / Egg    7. Scoliosis of lumbar spine, unspecified scoliosis type  -     Ambulatory Referral to Physical Therapy Evaluate and treat  -     TiZANidine  (Zanaflex) 4 MG capsule; Take 1 capsule by mouth At Night As Needed for Muscle Spasms (back pain).  Dispense: 30 capsule; Refill: 2    8. Encounter for screening colonoscopy  -     Ambulatory Referral to General Surgery    9. Need for influenza vaccination  -     FluLaval/Fluzone >6 mos ([id number])    Patient was here to establish care and to follow-up from the multiple ER visits and had multiple issues today took care of the most expedient aspects of his visit and patient will follow-up as needed we will begin physical therapy with his back medications prescribed for the constipation and the laceration of the index finger was redressed and splinted labs were drawn and a muscle relaxer for at bedtime was given    I spent 40 minutes caring for Yoni on this date of service. This time includes time spent by me in the following activities:preparing for the visit, reviewing tests, obtaining and/or reviewing a separately obtained history, performing a medically appropriate examination and/or evaluation , counseling and educating the patient/family/caregiver, ordering medications, tests, or procedures, referring and communicating with other health care professionals , documenting information in the medical record, independently interpreting results and communicating that information with the patient/family/caregiver and care coordination    Follow Up {Instructions Charge Capture  Follow-up Communications :23}    Return if symptoms worsen or fail to improve.

## 2022-11-07 NOTE — PROGRESS NOTES
Venipuncture Blood Specimen Collection  Venipuncture performed in left arm by Litzy Miller with good hemostasis. Patient tolerated the procedure well without complications.   11/07/22   Litzy Miller

## 2022-11-07 NOTE — PROGRESS NOTES
Please mail letter to patient stating    Mr. Aleman the x-rays of the lumbar spine shows mild multilevel degenerative disc disease and mild left mid lumbar scoliosis as we already discussed in the visit but then also it shows some osteopenia and they are recommending to consider correlation with a bone density-please let me know if it is okay with you for me to proceed with the bone density DEXA scan and I will get that ordered

## 2022-11-08 ENCOUNTER — TELEPHONE (OUTPATIENT)
Dept: FAMILY MEDICINE CLINIC | Facility: CLINIC | Age: 62
End: 2022-11-08

## 2022-11-08 NOTE — TELEPHONE ENCOUNTER
Caller: FORTINO PORTER    Relationship: Emergency Contact    Best call back number: [phone]    What is the best time to reach you: ANY TIME    Who are you requesting to speak with (clinical staff, provider,  specific staff member): PROVIDER    What was the call regarding: PATIENT HAS HAD LEG PAIN FOR MORE THAN TWO WEEKS PAIN IS GETTING UNBEARABLE NOT ABLE TO SLEEP MORE THAN AN HOUR AT A TIME, WANTS TO SPEAK DIRECTLY WITH MARTÍN AND DISCUSS POSSIBLY BEING  ADMITTED TO THE HOSPITAL TO GET  TREATMENT, PAIN MEDICATIONS ARE NOT HELPING   [facility]    Do you require a callback: YES, PLEASE CALL AS SOON AS POSSIBLE

## 2022-11-09 ENCOUNTER — APPOINTMENT (OUTPATIENT)
Dept: CT IMAGING | Facility: HOSPITAL | Age: 62
End: 2022-11-09

## 2022-11-09 ENCOUNTER — HOSPITAL ENCOUNTER (EMERGENCY)
Facility: HOSPITAL | Age: 62
Discharge: HOME | End: 2022-11-09
Attending: EMERGENCY MEDICINE | Admitting: EMERGENCY MEDICINE

## 2022-11-09 VITALS
HEART RATE: 82 BPM | OXYGEN SATURATION: 98 % | BODY MASS INDEX: 24.69 KG/M2 | WEIGHT: 166.67 LBS | SYSTOLIC BLOOD PRESSURE: 133 MMHG | DIASTOLIC BLOOD PRESSURE: 83 MMHG | TEMPERATURE: 97.5 F | RESPIRATION RATE: 16 BRPM | HEIGHT: 69 IN

## 2022-11-09 DIAGNOSIS — R10.9 ABDOMINAL PAIN, UNSPECIFIED ABDOMINAL LOCATION: Primary | ICD-10-CM

## 2022-11-09 DIAGNOSIS — K59.00 CONSTIPATION, UNSPECIFIED CONSTIPATION TYPE: ICD-10-CM

## 2022-11-09 LAB
ALBUMIN SERPL-MCNC: 4.9 G/DL (ref 3.5–5.2)
ALBUMIN/GLOB SERPL: 1.8 G/DL
ALP SERPL-CCNC: 60 U/L (ref 39–117)
ALT SERPL W P-5'-P-CCNC: 20 U/L (ref 1–41)
ANION GAP SERPL CALCULATED.3IONS-SCNC: 11.2 MMOL/L (ref 5–15)
AST SERPL-CCNC: 15 U/L (ref 1–40)
BASOPHILS # BLD AUTO: 0.01 10*3/MM3 (ref 0–0.2)
BASOPHILS NFR BLD AUTO: 0.1 % (ref 0–1.5)
BILIRUB SERPL-MCNC: 0.4 MG/DL (ref 0–1.2)
BILIRUB UR QL STRIP: NEGATIVE
BUN SERPL-MCNC: 13 MG/DL (ref 8–23)
BUN/CREAT SERPL: 13.7 (ref 7–25)
CALCIUM SPEC-SCNC: 9.5 MG/DL (ref 8.6–10.5)
CHLORIDE SERPL-SCNC: 102 MMOL/L (ref 98–107)
CLARITY UR: CLEAR
CO2 SERPL-SCNC: 24.8 MMOL/L (ref 22–29)
COLOR UR: YELLOW
CREAT SERPL-MCNC: 0.95 MG/DL (ref 0.76–1.27)
D-LACTATE SERPL-SCNC: 1.6 MMOL/L (ref 0.5–2)
DEPRECATED RDW RBC AUTO: 38.1 FL (ref 37–54)
EGFRCR SERPLBLD CKD-EPI 2021: 91.1 ML/MIN/1.73
EOSINOPHIL # BLD AUTO: 0 10*3/MM3 (ref 0–0.4)
EOSINOPHIL NFR BLD AUTO: 0 % (ref 0.3–6.2)
ERYTHROCYTE [DISTWIDTH] IN BLOOD BY AUTOMATED COUNT: 11.9 % (ref 12.3–15.4)
GLOBULIN UR ELPH-MCNC: 2.8 GM/DL
GLUCOSE SERPL-MCNC: 164 MG/DL (ref 65–99)
GLUCOSE UR STRIP-MCNC: NEGATIVE MG/DL
HCT VFR BLD AUTO: 40.8 % (ref 37.5–51)
HGB BLD-MCNC: 14 G/DL (ref 13–17.7)
HGB UR QL STRIP.AUTO: NEGATIVE
HOLD SPECIMEN: NORMAL
HOLD SPECIMEN: NORMAL
IMM GRANULOCYTES # BLD AUTO: 0.02 10*3/MM3 (ref 0–0.05)
IMM GRANULOCYTES NFR BLD AUTO: 0.3 % (ref 0–0.5)
KETONES UR QL STRIP: NEGATIVE
LEUKOCYTE ESTERASE UR QL STRIP.AUTO: NEGATIVE
LIPASE SERPL-CCNC: 23 U/L (ref 13–60)
LYMPHOCYTES # BLD AUTO: 0.7 10*3/MM3 (ref 0.7–3.1)
LYMPHOCYTES NFR BLD AUTO: 10.4 % (ref 19.6–45.3)
MCH RBC QN AUTO: 29.7 PG (ref 26.6–33)
MCHC RBC AUTO-ENTMCNC: 34.3 G/DL (ref 31.5–35.7)
MCV RBC AUTO: 86.6 FL (ref 79–97)
MONOCYTES # BLD AUTO: 0.11 10*3/MM3 (ref 0.1–0.9)
MONOCYTES NFR BLD AUTO: 1.6 % (ref 5–12)
NEUTROPHILS NFR BLD AUTO: 5.88 10*3/MM3 (ref 1.7–7)
NEUTROPHILS NFR BLD AUTO: 87.6 % (ref 42.7–76)
NITRITE UR QL STRIP: NEGATIVE
NRBC BLD AUTO-RTO: 0 /100 WBC (ref 0–0.2)
PH UR STRIP.AUTO: 7 [PH] (ref 5–8)
PLATELET # BLD AUTO: 289 10*3/MM3 (ref 140–450)
PMV BLD AUTO: 10.1 FL (ref 6–12)
POTASSIUM SERPL-SCNC: 4.7 MMOL/L (ref 3.5–5.2)
PROT SERPL-MCNC: 7.7 G/DL (ref 6–8.5)
PROT UR QL STRIP: NEGATIVE
RBC # BLD AUTO: 4.71 10*6/MM3 (ref 4.14–5.8)
SODIUM SERPL-SCNC: 138 MMOL/L (ref 136–145)
SP GR UR STRIP: 1.01 (ref 1–1.03)
UROBILINOGEN UR QL STRIP: NORMAL
WBC NRBC COR # BLD: 6.72 10*3/MM3 (ref 3.4–10.8)
WHOLE BLOOD HOLD COAG: NORMAL
WHOLE BLOOD HOLD SPECIMEN: NORMAL

## 2022-11-09 PROCEDURE — 99283 EMERGENCY DEPT VISIT LOW MDM: CPT

## 2022-11-09 PROCEDURE — 83690 ASSAY OF LIPASE: CPT

## 2022-11-09 PROCEDURE — 36415 COLL VENOUS BLD VENIPUNCTURE: CPT

## 2022-11-09 PROCEDURE — 81003 URINALYSIS AUTO W/O SCOPE: CPT

## 2022-11-09 PROCEDURE — 0 IOPAMIDOL PER 1 ML: Performed by: EMERGENCY MEDICINE

## 2022-11-09 PROCEDURE — 83605 ASSAY OF LACTIC ACID: CPT

## 2022-11-09 PROCEDURE — 74177 CT ABD & PELVIS W/CONTRAST: CPT

## 2022-11-09 PROCEDURE — 85025 COMPLETE CBC W/AUTO DIFF WBC: CPT

## 2022-11-09 PROCEDURE — 80053 COMPREHEN METABOLIC PANEL: CPT

## 2022-11-09 RX ORDER — DOCUSATE SODIUM 100 MG/1
100 CAPSULE, LIQUID FILLED ORAL 2 TIMES DAILY
Qty: 14 CAPSULE | Refills: 0 | Status: SHIPPED | OUTPATIENT
Start: 2022-11-09 | End: 2022-11-16

## 2022-11-09 RX ORDER — SODIUM CHLORIDE 0.9 % (FLUSH) 0.9 %
10 SYRINGE (ML) INJECTION AS NEEDED
Status: DISCONTINUED | OUTPATIENT
Start: 2022-11-09 | End: 2022-11-09 | Stop reason: HOSPADM

## 2022-11-09 RX ADMIN — IOPAMIDOL 100 ML: 755 INJECTION, SOLUTION INTRAVENOUS at 05:09

## 2022-11-09 NOTE — DISCHARGE INSTRUCTIONS
Please follow-up with your doctor tomorrow for serial reexamination the abdomen    Please have your primary care physician recheck your testicular exam..  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    Return to emergency medially for intractable abdominal pain, intractable fever, near passing out, passing out or any new symptoms you are concerned
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
## 2022-11-10 ENCOUNTER — APPOINTMENT (OUTPATIENT)
Dept: GENERAL RADIOLOGY | Facility: HOSPITAL | Age: 62
End: 2022-11-10

## 2022-11-10 ENCOUNTER — HOSPITAL ENCOUNTER (EMERGENCY)
Facility: HOSPITAL | Age: 62
Discharge: HOME | End: 2022-11-10
Attending: EMERGENCY MEDICINE | Admitting: EMERGENCY MEDICINE

## 2022-11-10 ENCOUNTER — APPOINTMENT (OUTPATIENT)
Dept: MRI IMAGING | Facility: HOSPITAL | Age: 62
End: 2022-11-10

## 2022-11-10 ENCOUNTER — TELEPHONE (OUTPATIENT)
Dept: FAMILY MEDICINE CLINIC | Facility: CLINIC | Age: 62
End: 2022-11-10

## 2022-11-10 VITALS
OXYGEN SATURATION: 95 % | DIASTOLIC BLOOD PRESSURE: 94 MMHG | HEART RATE: 91 BPM | TEMPERATURE: 97.7 F | BODY MASS INDEX: 24.49 KG/M2 | WEIGHT: 165.34 LBS | RESPIRATION RATE: 19 BRPM | SYSTOLIC BLOOD PRESSURE: 137 MMHG | HEIGHT: 69 IN

## 2022-11-10 DIAGNOSIS — G62.9 PERIPHERAL POLYNEUROPATHY: ICD-10-CM

## 2022-11-10 DIAGNOSIS — M53.86 SCIATICA ASSOCIATED WITH DISORDER OF LUMBAR SPINE: Primary | ICD-10-CM

## 2022-11-10 LAB
ALBUMIN SERPL-MCNC: 4.7 G/DL (ref 3.5–5.2)
ALBUMIN/GLOB SERPL: 2 G/DL
ALP SERPL-CCNC: 56 U/L (ref 39–117)
ALT SERPL W P-5'-P-CCNC: 17 U/L (ref 1–41)
ANION GAP SERPL CALCULATED.3IONS-SCNC: 11.4 MMOL/L (ref 5–15)
AST SERPL-CCNC: 13 U/L (ref 1–40)
BASOPHILS # BLD AUTO: 0.02 10*3/MM3 (ref 0–0.2)
BASOPHILS NFR BLD AUTO: 0.3 % (ref 0–1.5)
BILIRUB SERPL-MCNC: 0.5 MG/DL (ref 0–1.2)
BUN SERPL-MCNC: 12 MG/DL (ref 8–23)
BUN/CREAT SERPL: 12.9 (ref 7–25)
CALCIUM SPEC-SCNC: 9.2 MG/DL (ref 8.6–10.5)
CHLORIDE SERPL-SCNC: 103 MMOL/L (ref 98–107)
CO2 SERPL-SCNC: 22.6 MMOL/L (ref 22–29)
CREAT SERPL-MCNC: 0.93 MG/DL (ref 0.76–1.27)
DEPRECATED RDW RBC AUTO: 37.4 FL (ref 37–54)
EGFRCR SERPLBLD CKD-EPI 2021: 93.4 ML/MIN/1.73
EOSINOPHIL # BLD AUTO: 0.05 10*3/MM3 (ref 0–0.4)
EOSINOPHIL NFR BLD AUTO: 0.8 % (ref 0.3–6.2)
ERYTHROCYTE [DISTWIDTH] IN BLOOD BY AUTOMATED COUNT: 11.9 % (ref 12.3–15.4)
GLOBULIN UR ELPH-MCNC: 2.4 GM/DL
GLUCOSE SERPL-MCNC: 139 MG/DL (ref 65–99)
HCT VFR BLD AUTO: 38.9 % (ref 37.5–51)
HGB BLD-MCNC: 13.8 G/DL (ref 13–17.7)
IMM GRANULOCYTES # BLD AUTO: 0.02 10*3/MM3 (ref 0–0.05)
IMM GRANULOCYTES NFR BLD AUTO: 0.3 % (ref 0–0.5)
LYMPHOCYTES # BLD AUTO: 1.56 10*3/MM3 (ref 0.7–3.1)
LYMPHOCYTES NFR BLD AUTO: 24.1 % (ref 19.6–45.3)
MCH RBC QN AUTO: 30.7 PG (ref 26.6–33)
MCHC RBC AUTO-ENTMCNC: 35.5 G/DL (ref 31.5–35.7)
MCV RBC AUTO: 86.4 FL (ref 79–97)
MONOCYTES # BLD AUTO: 0.41 10*3/MM3 (ref 0.1–0.9)
MONOCYTES NFR BLD AUTO: 6.3 % (ref 5–12)
NEUTROPHILS NFR BLD AUTO: 4.4 10*3/MM3 (ref 1.7–7)
NEUTROPHILS NFR BLD AUTO: 68.2 % (ref 42.7–76)
NRBC BLD AUTO-RTO: 0 /100 WBC (ref 0–0.2)
PLATELET # BLD AUTO: 280 10*3/MM3 (ref 140–450)
PMV BLD AUTO: 10 FL (ref 6–12)
POTASSIUM SERPL-SCNC: 4 MMOL/L (ref 3.5–5.2)
PROT SERPL-MCNC: 7.1 G/DL (ref 6–8.5)
QT INTERVAL: 350 MS
RBC # BLD AUTO: 4.5 10*6/MM3 (ref 4.14–5.8)
SODIUM SERPL-SCNC: 137 MMOL/L (ref 136–145)
TROPONIN I SERPL-MCNC: 0 NG/ML (ref 0–0.08)
TROPONIN T SERPL-MCNC: <0.01 NG/ML (ref 0–0.03)
WBC NRBC COR # BLD: 6.46 10*3/MM3 (ref 3.4–10.8)

## 2022-11-10 PROCEDURE — 96372 THER/PROPH/DIAG INJ SC/IM: CPT

## 2022-11-10 PROCEDURE — 72148 MRI LUMBAR SPINE W/O DYE: CPT

## 2022-11-10 PROCEDURE — 93005 ELECTROCARDIOGRAM TRACING: CPT | Performed by: EMERGENCY MEDICINE

## 2022-11-10 PROCEDURE — 85025 COMPLETE CBC W/AUTO DIFF WBC: CPT | Performed by: EMERGENCY MEDICINE

## 2022-11-10 PROCEDURE — 84484 ASSAY OF TROPONIN QUANT: CPT | Performed by: EMERGENCY MEDICINE

## 2022-11-10 PROCEDURE — 80053 COMPREHEN METABOLIC PANEL: CPT | Performed by: EMERGENCY MEDICINE

## 2022-11-10 PROCEDURE — 96374 THER/PROPH/DIAG INJ IV PUSH: CPT

## 2022-11-10 PROCEDURE — 71045 X-RAY EXAM CHEST 1 VIEW: CPT

## 2022-11-10 PROCEDURE — 84484 ASSAY OF TROPONIN QUANT: CPT

## 2022-11-10 PROCEDURE — 36415 COLL VENOUS BLD VENIPUNCTURE: CPT

## 2022-11-10 PROCEDURE — 25010000002 HYDROMORPHONE 1 MG/ML SOLUTION: Performed by: EMERGENCY MEDICINE

## 2022-11-10 RX ORDER — OXYCODONE HYDROCHLORIDE AND ACETAMINOPHEN 5; 325 MG/1; MG/1
1 TABLET ORAL EVERY 6 HOURS PRN
Qty: 12 TABLET | Refills: 0 | Status: SHIPPED | OUTPATIENT
Start: 2022-11-10 | End: 2023-01-13

## 2022-11-10 RX ORDER — PREDNISONE 50 MG/1
50 TABLET ORAL DAILY
Qty: 5 TABLET | Refills: 0 | Status: SHIPPED | OUTPATIENT
Start: 2022-11-10 | End: 2023-01-19

## 2022-11-10 RX ORDER — TIZANIDINE 4 MG/1
TABLET ORAL
COMMUNITY
Start: 2022-11-07 | End: 2023-01-13

## 2022-11-10 RX ADMIN — HYDROMORPHONE HYDROCHLORIDE 1 MG: 1 INJECTION, SOLUTION INTRAMUSCULAR; INTRAVENOUS; SUBCUTANEOUS at 06:43

## 2022-11-10 RX ADMIN — HYDROMORPHONE HYDROCHLORIDE 1 MG: 1 INJECTION, SOLUTION INTRAMUSCULAR; INTRAVENOUS; SUBCUTANEOUS at 12:11

## 2022-11-10 NOTE — ED PROVIDER NOTES
Time: 6:21 AM EST  Arrived by: EMS  Chief Complaint: leg pain  History provided by: patient  History is limited by: N/A    History of Present Illness:  Patient is a 62 y.o. year old male who presents to the emergency department with numbnesss and burning in left lower leg onset last month. Patient was seen in ED on yesterday for same symptoms and has 6 ER visits in last couple of months at other facilities for same symptoms. Patient endorses low back pain, dysuria, chest pain.       History provided by:  Patient   used: No        Patient Care Team  Primary Care Provider: Valeria Avendano APRN    Past Medical History:     No Known Allergies  History reviewed. No pertinent past medical history.  History reviewed. No pertinent surgical history.  History reviewed. No pertinent family history.    Home Medications:  Prior to Admission medications    Medication Sig Start Date End Date Taking? Authorizing Provider   acetaminophen (TYLENOL) 325 MG tablet TAKE 2 TABLETS BY MOUTH EVERY 4 HOURS FOR 5 DAYS AS NEEDED FOR PAIN 10/25/22   Santi Bell MD   bacitracin 500 UNIT/GM ointment USE 1 APPLICATION TOPICALLY FOUR TIMES DAILY 10/25/22   Santi Bell MD   docusate sodium (COLACE) 100 MG capsule Take 1 capsule by mouth 2 (Two) Times a Day for 7 days. 11/9/22 11/16/22  Easton Polo DO   docusate sodium (Stool Softener) 100 MG capsule Take 1 capsule by mouth 2 (Two) Times a Day As Needed for Constipation. 11/7/22   Valeria Avendano APRN   HYDROcodone-acetaminophen (NORCO) 7.5-325 MG per tablet Take 1 tablet by mouth Every 4 (Four) Hours As Needed for Moderate Pain. 11/4/22   Howie Hamilton MD   ibuprofen (ADVIL,MOTRIN) 400 MG tablet TAKE 1 TABLET BY MOUTH EVERY 6 HOURS FOR 5 DAYS AS NEEDED FOR PAIN 10/25/22   Santi Bell MD   pantoprazole (PROTONIX) 40 MG EC tablet Take 1 tablet by mouth Daily. 11/5/22   Alis Aguayo MD   polyethylene glycol (MIRALAX) 17 g packet  "Take 17 g by mouth Daily. 11/7/22   Valeria Avendano APRN   predniSONE (DELTASONE) 50 MG tablet Take 1 tablet by mouth Daily. 11/4/22   Howie Hamilton MD   sulfamethoxazole-trimethoprim (BACTRIM DS,SEPTRA DS) 800-160 MG per tablet Take 1 tablet by mouth 2 (Two) Times a Day. 11/5/22   Alis Aguayo MD   TiZANidine (Zanaflex) 4 MG capsule Take 1 capsule by mouth At Night As Needed for Muscle Spasms (back pain). 11/7/22   Valeria Avendano APRN   tiZANidine (ZANAFLEX) 4 MG tablet  11/7/22   Provider, MD Santi        Social History:   Social History     Tobacco Use   • Smoking status: Former     Types: Cigarettes   • Smokeless tobacco: Never   Vaping Use   • Vaping Use: Never used   Substance Use Topics   • Alcohol use: Not Currently   • Drug use: Not Currently     Recent travel: not applicable    Review of Systems:  Review of Systems   Constitutional: Negative for chills and fever.   HENT: Negative for sore throat.    Eyes: Negative for photophobia.   Respiratory: Negative for shortness of breath.    Cardiovascular: Positive for chest pain.   Gastrointestinal: Negative for abdominal pain, diarrhea, nausea and vomiting.   Genitourinary: Positive for dysuria.   Musculoskeletal: Positive for back pain (lower). Negative for neck pain.        Positive for left leg pain   Skin: Negative for wound.   Neurological: Positive for numbness (left leg). Negative for headaches.   All other systems reviewed and are negative.       Physical Exam:  /94   Pulse 91   Temp 97.7 °F (36.5 °C) (Oral)   Resp 19   Ht 175.3 cm (69\")   Wt 75 kg (165 lb 5.5 oz)   SpO2 95%   BMI 24.42 kg/m²     Physical Exam  Vitals and nursing note reviewed.   Constitutional:       General: He is not in acute distress.  HENT:      Head: Normocephalic and atraumatic.   Eyes:      Extraocular Movements: Extraocular movements intact.   Cardiovascular:      Rate and Rhythm: Normal rate and regular rhythm.   Pulmonary:      Effort: " Pulmonary effort is normal. No respiratory distress.      Breath sounds: Normal breath sounds. No wheezing, rhonchi or rales.   Abdominal:      General: Abdomen is flat. There is no distension.      Palpations: Abdomen is soft.      Tenderness: There is no abdominal tenderness. There is no guarding or rebound.   Musculoskeletal:         General: Normal range of motion.      Cervical back: Normal range of motion and neck supple.   Skin:     General: Skin is warm and dry.      Capillary Refill: Capillary refill takes less than 2 seconds.   Neurological:      Mental Status: He is alert and oriented to person, place, and time. Mental status is at baseline.                Medications in the Emergency Department:  Medications   HYDROmorphone (DILAUDID) injection 1 mg (1 mg Intramuscular Given 11/10/22 0643)   HYDROmorphone (DILAUDID) injection 1 mg (1 mg Intravenous Given 11/10/22 1211)        Labs  Lab Results (last 24 hours)     ** No results found for the last 24 hours. **           Imaging:  No Radiology Exams Resulted Within Past 24 Hours    Procedures:  Procedures    Progress  ED Course as of 11/16/22 0716   Thu Nov 10, 2022   0947 EKG:    Rhythm: Normal sinus rhythm  Rate: Before  Intervals: Normal  T-wave: Normal  ST Segment: Normal    Affect present    EKG Comparison: Not available    Interpreted by me     [NL]      ED Course User Index  [NL] Rick Armstrong DO                            Medical Decision Making:  MDM   62-year-old male patient presents with complaints of low back pain and sciatica symptoms on the left leg.  Is been ongoing for some time.  Patient has had multiple ED visits both here and in [place] over the last month.  Patient was seen yesterday in this emergency department for abdominal pain and constipation.  There were no acute findings on CT evaluation of the abdomen and pelvis at that time.  Patient has been on pain medication for his back and sciatica pain.  An MRI was obtained which did not  show any emergent stenosis or signs of cauda equina.  The patient does have degenerative disc disease and minimal foraminal stenosis at the L3/L4 level.  At the time the MRI report was made available the patient complained of an episode of chest pain after he tried to have a bowel movement.  On evaluation the patient's chest pain is resolved.  EKG, labs and chest x-ray were ordered.        Final diagnoses:   Sciatica associated with disorder of lumbar spine   Peripheral polyneuropathy        Disposition:  ED Disposition     ED Disposition   Discharge    Condition   Stable    Comment   --             This medical record created using voice recognition software and a virtual scribe.    Documentation assistance provided by Danny Ignacio acting as scribe for No att. providers MD franco. Information recorded by the scribe was done at my direction and has been verified and validated by me.         Danny Ignacio  11/10/22 0637       Danny Ignacio  11/10/22 0937       Rick Armstrong DO  11/16/22 [id number]

## 2022-11-10 NOTE — DISCHARGE INSTRUCTIONS
Follow-up with Dr. Benjamin, neurosurgery, regarding your low back pain and sciatica.   Follow-up with Dr. Fields regarding your peripheral neuropathy.

## 2022-11-11 ENCOUNTER — OFFICE VISIT (OUTPATIENT)
Dept: FAMILY MEDICINE CLINIC | Facility: CLINIC | Age: 62
End: 2022-11-11

## 2022-11-11 VITALS
HEART RATE: 117 BPM | DIASTOLIC BLOOD PRESSURE: 86 MMHG | WEIGHT: 163 LBS | OXYGEN SATURATION: 96 % | TEMPERATURE: 98.2 F | BODY MASS INDEX: 24.07 KG/M2 | SYSTOLIC BLOOD PRESSURE: 138 MMHG

## 2022-11-11 DIAGNOSIS — Z13.220 SCREENING CHOLESTEROL LEVEL: ICD-10-CM

## 2022-11-11 DIAGNOSIS — K76.0 FATTY LIVER: Primary | ICD-10-CM

## 2022-11-11 DIAGNOSIS — R30.0 DYSURIA: ICD-10-CM

## 2022-11-11 DIAGNOSIS — N48.89 PENILE PAIN: ICD-10-CM

## 2022-11-11 DIAGNOSIS — N20.0 KIDNEY STONE: ICD-10-CM

## 2022-11-11 DIAGNOSIS — R07.9 CHEST PAIN, UNSPECIFIED TYPE: ICD-10-CM

## 2022-11-11 DIAGNOSIS — N30.00 ACUTE CYSTITIS WITHOUT HEMATURIA: ICD-10-CM

## 2022-11-11 DIAGNOSIS — Z12.5 SCREENING PSA (PROSTATE SPECIFIC ANTIGEN): ICD-10-CM

## 2022-11-11 DIAGNOSIS — N50.89 SWELLING OF THE TESTICLES: ICD-10-CM

## 2022-11-11 DIAGNOSIS — R81 GLUCOSURIA: ICD-10-CM

## 2022-11-11 LAB
BILIRUB BLD-MCNC: NEGATIVE MG/DL
C TRACH RRNA CVX QL NAA+PROBE: NOT DETECTED
CLARITY, POC: ABNORMAL
COLOR UR: ABNORMAL
EXPIRATION DATE: ABNORMAL
GLUCOSE UR STRIP-MCNC: ABNORMAL MG/DL
KETONES UR QL: ABNORMAL
LEUKOCYTE EST, POC: NEGATIVE
Lab: ABNORMAL
N GONORRHOEA RRNA SPEC QL NAA+PROBE: NOT DETECTED
NITRITE UR-MCNC: POSITIVE MG/ML
PH UR: 6 [PH] (ref 5–8)
PROT UR STRIP-MCNC: ABNORMAL MG/DL
RBC # UR STRIP: ABNORMAL /UL
SP GR UR: 1.01 (ref 1–1.03)
UROBILINOGEN UR QL: NORMAL

## 2022-11-11 PROCEDURE — 87086 URINE CULTURE/COLONY COUNT: CPT | Performed by: FAMILY MEDICINE

## 2022-11-11 PROCEDURE — G0103 PSA SCREENING: HCPCS | Performed by: FAMILY MEDICINE

## 2022-11-11 PROCEDURE — 85025 COMPLETE CBC W/AUTO DIFF WBC: CPT | Performed by: FAMILY MEDICINE

## 2022-11-11 PROCEDURE — 80053 COMPREHEN METABOLIC PANEL: CPT | Performed by: FAMILY MEDICINE

## 2022-11-11 PROCEDURE — 87591 N.GONORRHOEAE DNA AMP PROB: CPT | Performed by: FAMILY MEDICINE

## 2022-11-11 PROCEDURE — 84484 ASSAY OF TROPONIN QUANT: CPT | Performed by: FAMILY MEDICINE

## 2022-11-11 PROCEDURE — 87491 CHLMYD TRACH DNA AMP PROBE: CPT | Performed by: FAMILY MEDICINE

## 2022-11-11 PROCEDURE — 99214 OFFICE O/P EST MOD 30 MIN: CPT | Performed by: FAMILY MEDICINE

## 2022-11-11 RX ORDER — SULFAMETHOXAZOLE AND TRIMETHOPRIM 800; 160 MG/1; MG/1
1 TABLET ORAL 2 TIMES DAILY
Qty: 10 TABLET | Refills: 0 | Status: SHIPPED | OUTPATIENT
Start: 2022-11-11 | End: 2023-01-19

## 2022-11-11 NOTE — PROGRESS NOTES
Chief Complaint  Penis Pain (STOMACH GROWLING/PAIN IN PENIS/LEG TINGLING)    Subjective          Yoni MANSFIELD Ash presents to [facility]  History of Present Illness  Reviewed ER notes from multiple ER visits recently by pt    Pt was recently seen in Er for chest pains- that has resolved since that visit on 11/5/2022    Pt has penile pain x 2 weeks, no discharge- has redness of testicles- sudden onset- worsening symptoms over 2 weeks  Pt has had normal MRI lumbar spine and CT abdomen/pelvis      Objective   No Known Allergies  Immunization History   Administered Date(s) Administered   • COVID-19 (UNSPECIFIED) 06/16/2021, 07/16/2021, 01/28/2022   • FluLaval/Fluzone >6mos 11/07/2022   • Fluzone Quad >6mos (Multi-dose) 12/06/2018, 10/24/2019, 12/17/2020   • Influenza, Unspecified 10/17/2017, 01/28/2022   • Tdap 02/05/2017     History reviewed. No pertinent past medical history.   History reviewed. No pertinent surgical history.   Social History     Socioeconomic History   • Marital status: Single   Tobacco Use   • Smoking status: Former     Types: Cigarettes   • Smokeless tobacco: Never   Vaping Use   • Vaping Use: Never used   Substance and Sexual Activity   • Alcohol use: Not Currently   • Drug use: Not Currently   • Sexual activity: Yes        Current Outpatient Medications:   •  acetaminophen (TYLENOL) 325 MG tablet, TAKE 2 TABLETS BY MOUTH EVERY 4 HOURS FOR 5 DAYS AS NEEDED FOR PAIN, Disp: , Rfl:   •  bacitracin 500 UNIT/GM ointment, USE 1 APPLICATION TOPICALLY FOUR TIMES DAILY, Disp: , Rfl:   •  docusate sodium (COLACE) 100 MG capsule, Take 1 capsule by mouth 2 (Two) Times a Day for 7 days., Disp: 14 capsule, Rfl: 0  •  docusate sodium (Stool Softener) 100 MG capsule, Take 1 capsule by mouth 2 (Two) Times a Day As Needed for Constipation., Disp: 60 capsule, Rfl: 2  •  HYDROcodone-acetaminophen (NORCO) 7.5-325 MG per tablet, Take 1 tablet by mouth Every 4 (Four) Hours As Needed for  Moderate Pain., Disp: 12 tablet, Rfl: 0  •  ibuprofen (ADVIL,MOTRIN) 400 MG tablet, TAKE 1 TABLET BY MOUTH EVERY 6 HOURS FOR 5 DAYS AS NEEDED FOR PAIN, Disp: , Rfl:   •  oxyCODONE-acetaminophen (PERCOCET) 5-325 MG per tablet, Take 1 tablet by mouth Every 6 (Six) Hours As Needed for Severe Pain., Disp: 12 tablet, Rfl: 0  •  pantoprazole (PROTONIX) 40 MG EC tablet, Take 1 tablet by mouth Daily., Disp: 30 tablet, Rfl: 0  •  polyethylene glycol (MIRALAX) 17 g packet, Take 17 g by mouth Daily., Disp: 30 each, Rfl: 2  •  predniSONE (DELTASONE) 50 MG tablet, Take 1 tablet by mouth Daily., Disp: 5 tablet, Rfl: 0  •  predniSONE (DELTASONE) 50 MG tablet, Take 1 tablet by mouth Daily., Disp: 5 tablet, Rfl: 0  •  sulfamethoxazole-trimethoprim (BACTRIM DS,SEPTRA DS) 800-160 MG per tablet, Take 1 tablet by mouth 2 (Two) Times a Day., Disp: 10 tablet, Rfl: 0  •  TiZANidine (Zanaflex) 4 MG capsule, Take 1 capsule by mouth At Night As Needed for Muscle Spasms (back pain)., Disp: 30 capsule, Rfl: 2  •  tiZANidine (ZANAFLEX) 4 MG tablet, , Disp: , Rfl:    History reviewed. No pertinent family history.       Vital Signs:   Vitals:    11/11/22 1632   BP: 138/86   Pulse: 117   Temp: 98.2 °F (36.8 °C)   SpO2: 96%   Weight: 73.9 kg (163 lb)       Review of Systems      Physical Exam  Vitals reviewed. Exam conducted with a chaperone present.   Constitutional:       Appearance: Normal appearance. He is well-developed.   HENT:      Head: Normocephalic and atraumatic.      Right Ear: External ear normal.      Left Ear: External ear normal.      Mouth/Throat:      Pharynx: No oropharyngeal exudate.   Eyes:      Conjunctiva/sclera: Conjunctivae normal.      Pupils: Pupils are equal, round, and reactive to light.   Cardiovascular:      Rate and Rhythm: Normal rate and regular rhythm.      Pulses: Normal pulses.      Heart sounds: Normal heart sounds. No murmur heard.    No friction rub. No gallop.   Pulmonary:      Effort: Pulmonary effort is  normal.      Breath sounds: Normal breath sounds. No wheezing or rhonchi.   Abdominal:      General: Abdomen is flat. Bowel sounds are normal. There is no distension.      Palpations: Abdomen is soft. There is no mass.      Tenderness: There is no abdominal tenderness. There is no right CVA tenderness, left CVA tenderness, guarding or rebound.      Hernia: No hernia is present.   Genitourinary:     Penis: Normal.       Comments: Bilateral enlarged testicles with redness and slight warmth, mild tenderness, no hernias or other masses palpated.  Musculoskeletal:         General: Normal range of motion.      Cervical back: Normal range of motion and neck supple.   Skin:     General: Skin is warm and dry.      Capillary Refill: Capillary refill takes less than 2 seconds.   Neurological:      General: No focal deficit present.      Mental Status: He is alert and oriented to person, place, and time.      Cranial Nerves: No cranial nerve deficit.   Psychiatric:         Mood and Affect: Mood and affect normal.         Behavior: Behavior normal.         Thought Content: Thought content normal.         Judgment: Judgment normal.        Result Review :   The following data was reviewed by: Kirill Anderson MD on 11/11/2022:  CMP    CMP 11/9/22 11/10/22 11/11/22   Glucose 164 (A) 139 (A) 137 (A)   BUN 13 12 16   Creatinine 0.95 0.93 1.07   Sodium 138 137 135 (A)   Potassium 4.7 4.0 4.5   Chloride 102 103 97 (A)   Calcium 9.5 9.2 9.7   Albumin 4.90 4.70 4.80   Total Bilirubin 0.4 0.5 0.4   Alkaline Phosphatase 60 56 64   AST (SGOT) 15 13 15   ALT (SGPT) 20 17 15   (A) Abnormal value            CBC    CBC 11/9/22 11/10/22 11/11/22   WBC 6.72 6.46 8.28   RBC 4.71 4.50 4.68   Hemoglobin 14.0 13.8 13.9   Hematocrit 40.8 38.9 39.9   MCV 86.6 86.4 85.3   MCH 29.7 30.7 29.7   MCHC 34.3 35.5 34.8   RDW 11.9 (A) 11.9 (A) 11.9 (A)   Platelets 289 280 304   (A) Abnormal value                              Assessment and Plan    Diagnoses and  all orders for this visit:    1. Fatty liver (Primary)  -     Ambulatory Referral to Gastroenterology  -     Troponin; Future  -     Troponin    2. Chest pain, unspecified type  -     Ambulatory Referral to Cardiology  -     Cancel: Troponin T  -     Troponin; Future  -     Troponin    3. Penile pain  -     Chlamydia trachomatis, Neisseria gonorrhoeae, PCR - Urine, Urinary Bladder  -     POCT urinalysis dipstick, automated  -     CBC Auto Differential  -     Comprehensive Metabolic Panel  -     US Scrotum & Testicles; Future  -     Ambulatory Referral to Urology  -     Troponin; Future  -     Troponin    4. Dysuria  -     Ambulatory Referral to Urology  -     Troponin; Future  -     Troponin    5. Kidney stone  -     Ambulatory Referral to Urology  -     Troponin; Future  -     Troponin    6. Screening PSA (prostate specific antigen)  -     PSA Screen  -     Troponin; Future  -     Troponin    7. Screening cholesterol level  -     Cancel: Lipid Panel  -     Cancel: Lipid Panel; Future  -     Troponin; Future  -     Troponin  -     Lipid Panel    8. Swelling of the testicles  -     US Scrotum & Testicles; Future  -     Ambulatory Referral to Urology  -     Troponin; Future  -     Troponin    9. Acute cystitis without hematuria  -     Urine Culture - Urine, Urine, Clean Catch    10. Glucosuria  -     Cancel: Hemoglobin A1c  -     Cancel: Hemoglobin A1c; Future  -     Hemoglobin A1c    Other orders  -     sulfamethoxazole-trimethoprim (BACTRIM DS,SEPTRA DS) 800-160 MG per tablet; Take 1 tablet by mouth 2 (Two) Times a Day.  Dispense: 10 tablet; Refill: 0            Follow Up   Return in about 1 week (around 11/18/2022) for Recheck.  Patient was given instructions and counseling regarding his condition or for health maintenance advice. Please see specific information pulled into the AVS if appropriate.

## 2022-11-12 ENCOUNTER — CLINICAL SUPPORT (OUTPATIENT)
Dept: FAMILY MEDICINE CLINIC | Facility: CLINIC | Age: 62
End: 2022-11-12

## 2022-11-12 DIAGNOSIS — K76.0 FATTY LIVER: ICD-10-CM

## 2022-11-12 DIAGNOSIS — R07.9 CHEST PAIN, UNSPECIFIED TYPE: ICD-10-CM

## 2022-11-12 LAB
ALBUMIN SERPL-MCNC: 4.8 G/DL (ref 3.5–5.2)
ALBUMIN/GLOB SERPL: 1.8 G/DL
ALP SERPL-CCNC: 64 U/L (ref 39–117)
ALT SERPL W P-5'-P-CCNC: 15 U/L (ref 1–41)
ANION GAP SERPL CALCULATED.3IONS-SCNC: 14.1 MMOL/L (ref 5–15)
AST SERPL-CCNC: 15 U/L (ref 1–40)
BACTERIA SPEC AEROBE CULT: NO GROWTH
BASOPHILS # BLD AUTO: 0.01 10*3/MM3 (ref 0–0.2)
BASOPHILS NFR BLD AUTO: 0.1 % (ref 0–1.5)
BILIRUB SERPL-MCNC: 0.4 MG/DL (ref 0–1.2)
BUN SERPL-MCNC: 16 MG/DL (ref 8–23)
BUN/CREAT SERPL: 15 (ref 7–25)
CALCIUM SPEC-SCNC: 9.7 MG/DL (ref 8.6–10.5)
CHLORIDE SERPL-SCNC: 97 MMOL/L (ref 98–107)
CHOLEST SERPL-MCNC: 162 MG/DL (ref 0–200)
CO2 SERPL-SCNC: 23.9 MMOL/L (ref 22–29)
CREAT SERPL-MCNC: 1.07 MG/DL (ref 0.76–1.27)
DEPRECATED RDW RBC AUTO: 37.1 FL (ref 37–54)
EGFRCR SERPLBLD CKD-EPI 2021: 79 ML/MIN/1.73
EOSINOPHIL # BLD AUTO: 0 10*3/MM3 (ref 0–0.4)
EOSINOPHIL NFR BLD AUTO: 0 % (ref 0.3–6.2)
ERYTHROCYTE [DISTWIDTH] IN BLOOD BY AUTOMATED COUNT: 11.9 % (ref 12.3–15.4)
GLOBULIN UR ELPH-MCNC: 2.6 GM/DL
GLUCOSE SERPL-MCNC: 137 MG/DL (ref 65–99)
HBA1C MFR BLD: 5.6 % (ref 4.8–5.6)
HCT VFR BLD AUTO: 39.9 % (ref 37.5–51)
HDLC SERPL-MCNC: 54 MG/DL (ref 40–60)
HGB BLD-MCNC: 13.9 G/DL (ref 13–17.7)
IMM GRANULOCYTES # BLD AUTO: 0.03 10*3/MM3 (ref 0–0.05)
IMM GRANULOCYTES NFR BLD AUTO: 0.4 % (ref 0–0.5)
LDLC SERPL CALC-MCNC: 86 MG/DL (ref 0–100)
LDLC/HDLC SERPL: 1.54 {RATIO}
LYMPHOCYTES # BLD AUTO: 0.95 10*3/MM3 (ref 0.7–3.1)
LYMPHOCYTES NFR BLD AUTO: 11.5 % (ref 19.6–45.3)
MCH RBC QN AUTO: 29.7 PG (ref 26.6–33)
MCHC RBC AUTO-ENTMCNC: 34.8 G/DL (ref 31.5–35.7)
MCV RBC AUTO: 85.3 FL (ref 79–97)
MONOCYTES # BLD AUTO: 0.37 10*3/MM3 (ref 0.1–0.9)
MONOCYTES NFR BLD AUTO: 4.5 % (ref 5–12)
NEUTROPHILS NFR BLD AUTO: 6.92 10*3/MM3 (ref 1.7–7)
NEUTROPHILS NFR BLD AUTO: 83.5 % (ref 42.7–76)
NRBC BLD AUTO-RTO: 0 /100 WBC (ref 0–0.2)
PLATELET # BLD AUTO: 304 10*3/MM3 (ref 140–450)
PMV BLD AUTO: 10.8 FL (ref 6–12)
POTASSIUM SERPL-SCNC: 4.5 MMOL/L (ref 3.5–5.2)
PROT SERPL-MCNC: 7.4 G/DL (ref 6–8.5)
PSA SERPL-MCNC: 0.51 NG/ML (ref 0–4)
RBC # BLD AUTO: 4.68 10*6/MM3 (ref 4.14–5.8)
SODIUM SERPL-SCNC: 135 MMOL/L (ref 136–145)
TRIGL SERPL-MCNC: 125 MG/DL (ref 0–150)
TROPONIN T SERPL-MCNC: <0.01 NG/ML (ref 0–0.03)
VLDLC SERPL-MCNC: 22 MG/DL (ref 5–40)
WBC NRBC COR # BLD: 8.28 10*3/MM3 (ref 3.4–10.8)

## 2022-11-12 PROCEDURE — 80061 LIPID PANEL: CPT | Performed by: FAMILY MEDICINE

## 2022-11-12 PROCEDURE — 36415 COLL VENOUS BLD VENIPUNCTURE: CPT | Performed by: FAMILY MEDICINE

## 2022-11-12 PROCEDURE — 83036 HEMOGLOBIN GLYCOSYLATED A1C: CPT | Performed by: FAMILY MEDICINE

## 2022-11-12 NOTE — PROGRESS NOTES
Venipuncture Blood Specimen Collection  Venipuncture performed in left arm by Chava Jones with good hemostasis. Patient tolerated the procedure well without complications.   11/12/22   Chava Jones

## 2022-11-16 ENCOUNTER — OFFICE VISIT (OUTPATIENT)
Dept: CARDIOLOGY | Facility: CLINIC | Age: 62
End: 2022-11-16

## 2022-11-16 VITALS
HEIGHT: 69 IN | HEART RATE: 69 BPM | BODY MASS INDEX: 24.14 KG/M2 | SYSTOLIC BLOOD PRESSURE: 102 MMHG | WEIGHT: 163 LBS | DIASTOLIC BLOOD PRESSURE: 68 MMHG

## 2022-11-16 DIAGNOSIS — R07.9 CHEST PAIN, UNSPECIFIED TYPE: Primary | ICD-10-CM

## 2022-11-16 LAB
A-LACTALB IGE QN: <0.1 KU/L
CASEIN IGE QN: <0.1 KU/L
CHEDDAR IGE QN: <0.1 KU/L
CHEESE MOLD IGE QN: <0.1 KU/L
CONV CLASS DESCRIPTION: NORMAL
COW MILK BOILED IGE QN: <0.1 KU/L
COW MILK IGE QN: <0.1 KU/L
EGG WHITE IGE QN: <0.1 KU/L
EGG YOLK IGE QN: <0.1 KU/L
WHOLE EGG IGE QN: <0.1 KU/L

## 2022-11-16 PROCEDURE — 99202 OFFICE O/P NEW SF 15 MIN: CPT | Performed by: SPECIALIST

## 2022-11-16 RX ORDER — TAMSULOSIN HYDROCHLORIDE 0.4 MG/1
1 CAPSULE ORAL DAILY
COMMUNITY
Start: 2022-11-11 | End: 2023-01-19

## 2022-11-16 NOTE — PROGRESS NOTES
[facility]   Cardiology Consult Note    Patient Name: Yoni Aleman  : 1960  Referring Physician: Kirill Anderson MD  Subjective   Subjective     Reason for Consult/ Chief Complaint:   Chief Complaint   Patient presents with   • Chest Pain       HPI:  Yoni Aleman is a 62 y.o. male with history of chest pain a few weeks ago.  Chest pain is substernal, burning, mild intensity, radiate across the chest.  No aggravating relieving factors.  Went to the emergency room evaluated and discharged.  No exertional angina.  No shortness of breath.    Review of Systems:    Constitutional no fever,  no weight loss   Skin no rash   Otolaryngeal no difficulty swallowing   Cardiovascular See HPI   Pulmonary no cough, no sputum production   Gastrointestinal no constipation, no diarrhea   Genitourinary no dysuria, no hematuria   Hematologic no easy bruisability, no abnormal bleeding   Musculoskeletal no muscle pain   Neurologic no dizziness, no falls       Personal History     Past Medical History:  History reviewed. No pertinent past medical history.    Family History: History reviewed. No pertinent family history.    Social History:  reports that he has quit smoking. His smoking use included cigarettes. He has never used smokeless tobacco. He reports that he does not currently use alcohol. He reports that he does not currently use drugs.    Home Medications:  HYDROcodone-acetaminophen, TiZANidine, acetaminophen, bacitracin, docusate sodium, ibuprofen, oxyCODONE-acetaminophen, pantoprazole, polyethylene glycol, predniSONE, sulfamethoxazole-trimethoprim, tamsulosin, and tiZANidine    Allergies:  No Known Allergies    Objective    Objective     Vitals:   Heart Rate:  [69] 69  BP: (102)/(68) 102/68  Body mass index is 24.07 kg/m².  PHYSICAL EXAM:    General Appearance:   · well developed  · well nourished  HENT:   · oropharynx moist  · lips not cyanotic  Neck:  · thyroid not enlarged  · supple  Respiratory:  · no  respiratory distress  · normal breath sounds  · no rales  Cardiovascular:  · no jugular venous distention  · regular rhythm  · apical impulse normal  · S1 normal, S2 normal  · no S3, no S4   · no murmur  · no rub, no thrill  · carotid pulses normal; no bruit  · pedal pulses normal  · lower extremity edema: none    Skin:   · warm, dry  Psychiatric:  · judgement and insight appropriate  · normal mood and affect    RESULTS:    EKG reviewed by me and shows sinus rhythm with no acute changes.       Result Review    Result Review:  I have personally reviewed the available results:  [x]  Laboratory  [x]  EKG/Telemetry   [x]  Cardiology/Vascular   [x] Medications  [x]  Old records  Lab Results   Component Value Date    CHOL 162 11/12/2022     Lab Results   Component Value Date    TRIG 125 11/12/2022    TRIG 88 10/11/2019     Lab Results   Component Value Date    HDL 54 11/12/2022    HDL 54 10/11/2019     Lab Results   Component Value Date    LDL 86 11/12/2022    LDL 87 10/11/2019     Lab Results   Component Value Date    VLDL 22 11/12/2022    VLDL 18 10/11/2019         Procedures     Impression/Plan    Precordial atypical chest pain: Sestamibi stress test to evaluate for any significant ischemia.  He cannot walk on a treadmill.  Echocardiogram to evaluate left-ventricular systolic function.  I reviewed his reports from the emergency room.        Electronically signed by Joel Louis MD, 11/16/22, 12:25 PM EST.

## 2022-11-16 NOTE — PROGRESS NOTES
Please mail letter to patient stating    Yoni, the dairy and egg food allergy panel was completely negative

## 2022-11-28 ENCOUNTER — APPOINTMENT (OUTPATIENT)
Dept: ULTRASOUND IMAGING | Facility: HOSPITAL | Age: 62
End: 2022-11-28

## 2023-01-07 ENCOUNTER — HOSPITAL ENCOUNTER (EMERGENCY)
Facility: HOSPITAL | Age: 63
Discharge: HOME | End: 2023-01-07
Attending: EMERGENCY MEDICINE | Admitting: EMERGENCY MEDICINE
Payer: COMMERCIAL

## 2023-01-07 ENCOUNTER — APPOINTMENT (OUTPATIENT)
Dept: CARDIOLOGY | Facility: HOSPITAL | Age: 63
End: 2023-01-07
Payer: COMMERCIAL

## 2023-01-07 VITALS
DIASTOLIC BLOOD PRESSURE: 63 MMHG | OXYGEN SATURATION: 98 % | BODY MASS INDEX: 24.46 KG/M2 | WEIGHT: 165.12 LBS | HEART RATE: 95 BPM | HEIGHT: 69 IN | SYSTOLIC BLOOD PRESSURE: 113 MMHG | RESPIRATION RATE: 18 BRPM | TEMPERATURE: 98.1 F

## 2023-01-07 DIAGNOSIS — R60.0 PEDAL EDEMA: Primary | ICD-10-CM

## 2023-01-07 DIAGNOSIS — M79.604 PAIN IN BOTH LOWER EXTREMITIES: ICD-10-CM

## 2023-01-07 DIAGNOSIS — M79.605 PAIN IN BOTH LOWER EXTREMITIES: ICD-10-CM

## 2023-01-07 DIAGNOSIS — I82.462 ACUTE DEEP VEIN THROMBOSIS (DVT) OF CALF MUSCLE VEIN OF LEFT LOWER EXTREMITY: ICD-10-CM

## 2023-01-07 LAB
ALBUMIN SERPL-MCNC: 3.9 G/DL (ref 3.5–5.2)
ALBUMIN/GLOB SERPL: 1.4 G/DL
ALP SERPL-CCNC: 83 U/L (ref 39–117)
ALT SERPL W P-5'-P-CCNC: 11 U/L (ref 1–41)
ANION GAP SERPL CALCULATED.3IONS-SCNC: 10.2 MMOL/L (ref 5–15)
AST SERPL-CCNC: 11 U/L (ref 1–40)
BASOPHILS # BLD AUTO: 0.04 10*3/MM3 (ref 0–0.2)
BASOPHILS NFR BLD AUTO: 0.6 % (ref 0–1.5)
BH CV LOW VAS LEFT GASTRONEMIUS VESSEL: 1
BH CV LOWER VASCULAR LEFT COMMON FEMORAL AUGMENT: NORMAL
BH CV LOWER VASCULAR LEFT COMMON FEMORAL COMPETENT: NORMAL
BH CV LOWER VASCULAR LEFT COMMON FEMORAL COMPRESS: NORMAL
BH CV LOWER VASCULAR LEFT COMMON FEMORAL PHASIC: NORMAL
BH CV LOWER VASCULAR LEFT COMMON FEMORAL SPONT: NORMAL
BH CV LOWER VASCULAR LEFT DISTAL FEMORAL COMPRESS: NORMAL
BH CV LOWER VASCULAR LEFT GASTRONEMIUS COMPRESS: NORMAL
BH CV LOWER VASCULAR LEFT GASTRONEMIUS THROMBUS: NORMAL
BH CV LOWER VASCULAR LEFT GREATER SAPH AK COMPRESS: NORMAL
BH CV LOWER VASCULAR LEFT GREATER SAPH BK COMPRESS: NORMAL
BH CV LOWER VASCULAR LEFT LESSER SAPH COMPRESS: NORMAL
BH CV LOWER VASCULAR LEFT MID FEMORAL AUGMENT: NORMAL
BH CV LOWER VASCULAR LEFT MID FEMORAL COMPETENT: NORMAL
BH CV LOWER VASCULAR LEFT MID FEMORAL COMPRESS: NORMAL
BH CV LOWER VASCULAR LEFT MID FEMORAL PHASIC: NORMAL
BH CV LOWER VASCULAR LEFT MID FEMORAL SPONT: NORMAL
BH CV LOWER VASCULAR LEFT PERONEAL COMPRESS: NORMAL
BH CV LOWER VASCULAR LEFT POPLITEAL AUGMENT: NORMAL
BH CV LOWER VASCULAR LEFT POPLITEAL COMPETENT: NORMAL
BH CV LOWER VASCULAR LEFT POPLITEAL COMPRESS: NORMAL
BH CV LOWER VASCULAR LEFT POPLITEAL PHASIC: NORMAL
BH CV LOWER VASCULAR LEFT POPLITEAL SPONT: NORMAL
BH CV LOWER VASCULAR LEFT POSTERIOR TIBIAL COMPRESS: NORMAL
BH CV LOWER VASCULAR LEFT PROXIMAL FEMORAL COMPRESS: NORMAL
BH CV LOWER VASCULAR LEFT SAPHENOFEMORAL JUNCTION COMPRESS: NORMAL
BH CV LOWER VASCULAR RIGHT COMMON FEMORAL AUGMENT: NORMAL
BH CV LOWER VASCULAR RIGHT COMMON FEMORAL COMPETENT: NORMAL
BH CV LOWER VASCULAR RIGHT COMMON FEMORAL COMPRESS: NORMAL
BH CV LOWER VASCULAR RIGHT COMMON FEMORAL PHASIC: NORMAL
BH CV LOWER VASCULAR RIGHT COMMON FEMORAL SPONT: NORMAL
BH CV LOWER VASCULAR RIGHT DISTAL FEMORAL COMPRESS: NORMAL
BH CV LOWER VASCULAR RIGHT GASTRONEMIUS COMPRESS: NORMAL
BH CV LOWER VASCULAR RIGHT GREATER SAPH AK COMPRESS: NORMAL
BH CV LOWER VASCULAR RIGHT GREATER SAPH BK COMPRESS: NORMAL
BH CV LOWER VASCULAR RIGHT LESSER SAPH COMPRESS: NORMAL
BH CV LOWER VASCULAR RIGHT MID FEMORAL AUGMENT: NORMAL
BH CV LOWER VASCULAR RIGHT MID FEMORAL COMPETENT: NORMAL
BH CV LOWER VASCULAR RIGHT MID FEMORAL COMPRESS: NORMAL
BH CV LOWER VASCULAR RIGHT MID FEMORAL PHASIC: NORMAL
BH CV LOWER VASCULAR RIGHT MID FEMORAL SPONT: NORMAL
BH CV LOWER VASCULAR RIGHT PERONEAL COMPRESS: NORMAL
BH CV LOWER VASCULAR RIGHT POPLITEAL AUGMENT: NORMAL
BH CV LOWER VASCULAR RIGHT POPLITEAL COMPETENT: NORMAL
BH CV LOWER VASCULAR RIGHT POPLITEAL COMPRESS: NORMAL
BH CV LOWER VASCULAR RIGHT POPLITEAL PHASIC: NORMAL
BH CV LOWER VASCULAR RIGHT POPLITEAL SPONT: NORMAL
BH CV LOWER VASCULAR RIGHT POSTERIOR TIBIAL COMPRESS: NORMAL
BH CV LOWER VASCULAR RIGHT PROXIMAL FEMORAL COMPRESS: NORMAL
BH CV LOWER VASCULAR RIGHT SAPHENOFEMORAL JUNCTION COMPRESS: NORMAL
BH CV VAS PRELIMINARY FINDINGS SCRIPTING: 1
BILIRUB SERPL-MCNC: 0.4 MG/DL (ref 0–1.2)
BUN SERPL-MCNC: 12 MG/DL (ref 8–23)
BUN/CREAT SERPL: 16.9 (ref 7–25)
CALCIUM SPEC-SCNC: 9.5 MG/DL (ref 8.6–10.5)
CHLORIDE SERPL-SCNC: 102 MMOL/L (ref 98–107)
CO2 SERPL-SCNC: 26.8 MMOL/L (ref 22–29)
CREAT SERPL-MCNC: 0.71 MG/DL (ref 0.76–1.27)
DEPRECATED RDW RBC AUTO: 51.5 FL (ref 37–54)
EGFRCR SERPLBLD CKD-EPI 2021: 104.4 ML/MIN/1.73
EOSINOPHIL # BLD AUTO: 0.33 10*3/MM3 (ref 0–0.4)
EOSINOPHIL NFR BLD AUTO: 4.6 % (ref 0.3–6.2)
ERYTHROCYTE [DISTWIDTH] IN BLOOD BY AUTOMATED COUNT: 15.3 % (ref 12.3–15.4)
GLOBULIN UR ELPH-MCNC: 2.8 GM/DL
GLUCOSE SERPL-MCNC: 100 MG/DL (ref 65–99)
HCT VFR BLD AUTO: 32.5 % (ref 37.5–51)
HGB BLD-MCNC: 10 G/DL (ref 13–17.7)
IMM GRANULOCYTES # BLD AUTO: 0.02 10*3/MM3 (ref 0–0.05)
IMM GRANULOCYTES NFR BLD AUTO: 0.3 % (ref 0–0.5)
LYMPHOCYTES # BLD AUTO: 1.79 10*3/MM3 (ref 0.7–3.1)
LYMPHOCYTES NFR BLD AUTO: 25 % (ref 19.6–45.3)
MAXIMAL PREDICTED HEART RATE: 159 BPM
MCH RBC QN AUTO: 27.9 PG (ref 26.6–33)
MCHC RBC AUTO-ENTMCNC: 30.8 G/DL (ref 31.5–35.7)
MCV RBC AUTO: 90.5 FL (ref 79–97)
MONOCYTES # BLD AUTO: 0.53 10*3/MM3 (ref 0.1–0.9)
MONOCYTES NFR BLD AUTO: 7.4 % (ref 5–12)
NEUTROPHILS NFR BLD AUTO: 4.44 10*3/MM3 (ref 1.7–7)
NEUTROPHILS NFR BLD AUTO: 62.1 % (ref 42.7–76)
NRBC BLD AUTO-RTO: 0 /100 WBC (ref 0–0.2)
NT-PROBNP SERPL-MCNC: 194.7 PG/ML (ref 0–900)
PLATELET # BLD AUTO: 447 10*3/MM3 (ref 140–450)
PMV BLD AUTO: 10.2 FL (ref 6–12)
POTASSIUM SERPL-SCNC: 4.2 MMOL/L (ref 3.5–5.2)
PROT SERPL-MCNC: 6.7 G/DL (ref 6–8.5)
RBC # BLD AUTO: 3.59 10*6/MM3 (ref 4.14–5.8)
SODIUM SERPL-SCNC: 139 MMOL/L (ref 136–145)
STRESS TARGET HR: 135 BPM
WBC NRBC COR # BLD: 7.15 10*3/MM3 (ref 3.4–10.8)

## 2023-01-07 PROCEDURE — 85025 COMPLETE CBC W/AUTO DIFF WBC: CPT | Performed by: EMERGENCY MEDICINE

## 2023-01-07 PROCEDURE — 83880 ASSAY OF NATRIURETIC PEPTIDE: CPT | Performed by: EMERGENCY MEDICINE

## 2023-01-07 PROCEDURE — 93970 EXTREMITY STUDY: CPT

## 2023-01-07 PROCEDURE — 36415 COLL VENOUS BLD VENIPUNCTURE: CPT

## 2023-01-07 PROCEDURE — 99282 EMERGENCY DEPT VISIT SF MDM: CPT

## 2023-01-07 PROCEDURE — 93970 EXTREMITY STUDY: CPT | Performed by: SURGERY

## 2023-01-07 PROCEDURE — 80053 COMPREHEN METABOLIC PANEL: CPT | Performed by: EMERGENCY MEDICINE

## 2023-01-07 RX ORDER — HYDROCODONE BITARTRATE AND ACETAMINOPHEN 7.5; 325 MG/1; MG/1
1 TABLET ORAL EVERY 6 HOURS PRN
Qty: 15 TABLET | Refills: 0 | Status: SHIPPED | OUTPATIENT
Start: 2023-01-07

## 2023-01-07 RX ORDER — FUROSEMIDE 20 MG/1
20 TABLET ORAL DAILY
Qty: 10 TABLET | Refills: 0 | Status: SHIPPED | OUTPATIENT
Start: 2023-01-07 | End: 2023-01-30

## 2023-01-07 NOTE — DISCHARGE INSTRUCTIONS
Continue your current home medications especially continue with your Eliquis as prescribed.  Take pain medication as needed use sparingly.  Take water pill daily as prescribed.  Wear compression stockings.  Keep legs elevated when sitting.  Follow-up your primary care provider in 3 days.  Call Monday for next available appointment.  Return to the ER for any new or worsening symptoms.

## 2023-01-07 NOTE — ED PROVIDER NOTES
Time: 4:30 PM EST  Date of encounter:  1/7/2023  Independent Historian/Clinical History and Information was obtained by:   Patient  Chief Complaint: Lower extremity swelling and pain    History is limited by: N/A    History of Present Illness:  Patient is a 62 y.o. year old male who presents to the emergency department for evaluation of lower extremity swelling and pain.  Patient states that he was hospitalized at [facility] for a month and a half.  Patient states that during this time he was found of a blood clot in his lungs and is currently taking Eliquis.  Patient reports that he was discharged little over a week ago.  Patient comes in today complaining of swelling in his bilateral lower extremities.  Patient reports this began a couple of days ago.  Patient denies ever having swelling in his legs before.  Patient does report a history of chronic sciatica for which she has been seen previously.  Patient denies any acute chest pain or shortness of breath.    HPI    Patient Care Team  Primary Care Provider: Valeria Avendano APRN    Past Medical History:     No Known Allergies  Past Medical History:   Diagnosis Date   • Gastric ulcer    • Sciatic nerve pain      Past Surgical History:   Procedure Laterality Date   • SPLENECTOMY       History reviewed. No pertinent family history.    Home Medications:  Prior to Admission medications    Medication Sig Start Date End Date Taking? Authorizing Provider   acetaminophen (TYLENOL) 325 MG tablet TAKE 2 TABLETS BY MOUTH EVERY 4 HOURS FOR 5 DAYS AS NEEDED FOR PAIN 10/25/22   ProviderSanti MD   bacitracin 500 UNIT/GM ointment USE 1 APPLICATION TOPICALLY FOUR TIMES DAILY 10/25/22   Santi Bell MD   docusate sodium (Stool Softener) 100 MG capsule Take 1 capsule by mouth 2 (Two) Times a Day As Needed for Constipation. 11/7/22   Valeria Avendano APRN   HYDROcodone-acetaminophen (NORCO) 7.5-325 MG per tablet Take 1 tablet by mouth Every 4  (Four) Hours As Needed for Moderate Pain. 11/4/22   Howie Hamilton MD   ibuprofen (ADVIL,MOTRIN) 400 MG tablet TAKE 1 TABLET BY MOUTH EVERY 6 HOURS FOR 5 DAYS AS NEEDED FOR PAIN 10/25/22   Santi Bell MD   oxyCODONE-acetaminophen (PERCOCET) 5-325 MG per tablet Take 1 tablet by mouth Every 6 (Six) Hours As Needed for Severe Pain. 11/10/22   Rick Armstrong DO   pantoprazole (PROTONIX) 40 MG EC tablet Take 1 tablet by mouth Daily. 11/5/22   Alis Aguayo MD   polyethylene glycol (MIRALAX) 17 g packet Take 17 g by mouth Daily. 11/7/22   Valeria Avendano APRN   predniSONE (DELTASONE) 50 MG tablet Take 1 tablet by mouth Daily. 11/4/22   Howie Hamilton MD   predniSONE (DELTASONE) 50 MG tablet Take 1 tablet by mouth Daily. 11/10/22   Rick Armstrong DO   sulfamethoxazole-trimethoprim (BACTRIM DS,SEPTRA DS) 800-160 MG per tablet Take 1 tablet by mouth 2 (Two) Times a Day. 11/11/22   Kirill Anderson MD   tamsulosin (FLOMAX) 0.4 MG capsule 24 hr capsule Take 1 capsule by mouth Daily. for 14 days 11/11/22   Santi Bell MD   TiZANidine (Zanaflex) 4 MG capsule Take 1 capsule by mouth At Night As Needed for Muscle Spasms (back pain). 11/7/22   Valeria Avendano APRN   tiZANidine (ZANAFLEX) 4 MG tablet  11/7/22   Santi Bell MD        Social History:   Social History     Tobacco Use   • Smoking status: Former     Types: Cigarettes   • Smokeless tobacco: Never   Vaping Use   • Vaping Use: Never used   Substance Use Topics   • Alcohol use: Not Currently   • Drug use: Not Currently         Review of Systems:  Review of Systems   Constitutional: Negative for chills and fever.   HENT: Negative for congestion, ear pain and sore throat.    Eyes: Negative for pain.   Respiratory: Negative for cough, chest tightness and shortness of breath.    Cardiovascular: Positive for leg swelling. Negative for chest pain.   Gastrointestinal: Negative for abdominal pain, diarrhea, nausea and vomiting.  "  Genitourinary: Negative for flank pain and hematuria.   Musculoskeletal: Positive for myalgias. Negative for joint swelling.   Skin: Negative for pallor.   Neurological: Negative for seizures and headaches.   All other systems reviewed and are negative.       Physical Exam:  /63   Pulse 95   Temp 98.1 °F (36.7 °C) (Oral)   Resp 18   Ht 175.3 cm (69\")   Wt 74.9 kg (165 lb 2 oz)   SpO2 98%   BMI 24.38 kg/m²     Physical Exam Vital signs were reviewed under triage note.  General appearance - Patient appears well-developed and well-nourished.  Patient is in no acute distress.  Head - Normocephalic, atraumatic.  Pupils - Equal, round, reactive to light.  Extraocular muscles are intact.  Conjunctive is clear.  Nasal - Normal inspection.  No evidence of trauma or epistaxis.  Tympanic membranes - Gray, intact without erythema or retractions.  Oral mucosa - Pink and moist without lesions or erythema.  Uvula is midline.  Chest wall - Atraumatic.  Chest wall is nontender.  There is no vesicular rashes noted.  Neck - Supple.  Trachea was midline.  There is no palpable lymphadenopathy or thyromegaly.  There are no meningeal signs  Lungs - Clear to auscultation and percussion bilaterally.  Heart - Regular rate and rhythm without any murmurs, clicks, or gallops.  Abdomen - Soft.  Bowel sounds are present.  There is no palpable tenderness.  There is no rebound, guarding, or rigidity.  There are no palpable masses.  There are no pulsatile masses.  Back - Spine is straight and midline.  There is no CVA tenderness.  Extremities - Intact x4 with full range of motion.  There is 2+ palpable edema.  Patient has diffuse tenderness in his bilateral calfs.  Distal pulses are intact x4 and equal.  Neurologic - Patient is awake, alert, and oriented x3.  Cranial nerves II through XII are grossly intact.  Motor and sensory functions grossly intact.  Cerebellar function was normal.  Integument - There are no rashes.  There are no " petechia or purpura lesions noted.  There are no vesicular lesions noted.              Procedures:  Procedures      Medical Decision Making:      Comorbidities that affect care:    Gastric ulcer, chronic sciatica, pulmonary embolism, depression with prior suicide attempt    External Notes reviewed:    None      The following orders were placed and all results were independently analyzed by me:  Orders Placed This Encounter   Procedures   • Comprehensive Metabolic Panel   • BNP   • CBC Auto Differential   • TIM hose  Nursing Communication   • CBC & Differential       Medications Given in the Emergency Department:  Medications - No data to display     ED Course:         The patient was seen and evaluated in the ED by me.  The above history and physical examination was performed as documented.  Diagnostic data was obtained.  Results reviewed.  Discussed with the patient.  Laboratory work-up was unremarkable.  Venous duplex was performed.  Verbal report given to me revealed no DVT in the right lower extremity.  Patient was noted to have a calf DVT in his left lower extremity.  The patient is currently on Eliquis and such requires no additional treatment.  Due to the edema I will put the patient on a weeks worth of Lasix.  Patient will also be given a couple days worth of pain medicine.  Patient instructed that if further pain will require treatment by his PCP or other providers not by coming through the ER.  I do feel that the patient may have potential for drug-seeking behavior however he does also have acute medical condition that can also warrant acute pain.    Labs:    Lab Results (last 24 hours)     Procedure Component Value Units Date/Time    CBC & Differential [[id number]]  (Abnormal) Collected: 01/07/23 1333    Specimen: Blood Updated: 01/07/23 4657    Narrative:      The following orders were created for panel order CBC & Differential.  Procedure                               Abnormality         Status                      ---------                               -----------         ------                     CBC Auto Differential[[id number]]        Abnormal            Final result                 Please view results for these tests on the individual orders.    Comprehensive Metabolic Panel [[id number]]  (Abnormal) Collected: 01/07/23 1333    Specimen: Blood from Arm, Left Updated: 01/07/23 1408     Glucose 100 mg/dL      BUN 12 mg/dL      Creatinine 0.71 mg/dL      Sodium 139 mmol/L      Potassium 4.2 mmol/L      Chloride 102 mmol/L      CO2 26.8 mmol/L      Calcium 9.5 mg/dL      Total Protein 6.7 g/dL      Albumin 3.9 g/dL      ALT (SGPT) 11 U/L      AST (SGOT) 11 U/L      Alkaline Phosphatase 83 U/L      Total Bilirubin 0.4 mg/dL      Globulin 2.8 gm/dL      A/G Ratio 1.4 g/dL      BUN/Creatinine Ratio 16.9     Anion Gap 10.2 mmol/L      eGFR 104.4 mL/min/1.73      Comment: National Kidney Foundation and American Society of Nephrology (ASN) Task Force recommended calculation based on the Chronic Kidney Disease Epidemiology Collaboration (CKD-EPI) equation refit without adjustment for race.       Narrative:      GFR Normal >60  Chronic Kidney Disease <60  Kidney Failure <15      BNP [[id number]]  (Normal) Collected: 01/07/23 1333    Specimen: Blood from Arm, Left Updated: 01/07/23 1405     proBNP 194.7 pg/mL     Narrative:      Among patients with dyspnea, NT-proBNP is highly sensitive for the detection of acute congestive heart failure. In addition NT-proBNP of <300 pg/ml effectively rules out acute congestive heart failure with 99% negative predictive value.      CBC Auto Differential [[id number]]  (Abnormal) Collected: 01/07/23 1333    Specimen: Blood Updated: 01/07/23 1347     WBC 7.15 10*3/mm3      RBC 3.59 10*6/mm3      Hemoglobin 10.0 g/dL      Hematocrit 32.5 %      MCV 90.5 fL      MCH 27.9 pg      MCHC 30.8 g/dL      RDW 15.3 %      RDW-SD 51.5 fl      MPV 10.2 fL      Platelets 447 10*3/mm3      Neutrophil % 62.1 %       Lymphocyte % 25.0 %      Monocyte % 7.4 %      Eosinophil % 4.6 %      Basophil % 0.6 %      Immature Grans % 0.3 %      Neutrophils, Absolute 4.44 10*3/mm3      Lymphocytes, Absolute 1.79 10*3/mm3      Monocytes, Absolute 0.53 10*3/mm3      Eosinophils, Absolute 0.33 10*3/mm3      Basophils, Absolute 0.04 10*3/mm3      Immature Grans, Absolute 0.02 10*3/mm3      nRBC 0.0 /100 WBC            Imaging:    Duplex Venous Lower Extremity - Bilateral CAR    Result Date: 1/7/2023  •  Acute left lower extremity deep vein thrombosis noted in the gastrocnemius. •  All other veins appeared normal bilaterally.         Differential Diagnosis and Discussion:    Extremity Pain: Differential diagnosis includes but is not limited to soft tissue sprain, tendonitis, tendon injury, dislocation, fracture, deep vein thrombosis, arterial insufficiency, osteoarthritis, bursitis, and ligamentous damage.    All labs were reviewed and analyzed by me.    NABIL         Patient Care Considerations:    None      Consultants/Shared Management Plan:    None    Social Determinants of Health:    Patient is independent, reliable, and has access to care.       Disposition and Care Coordination:    Discharged: The patient is suitable and stable for discharge with no need for consideration of observation or admission.    I have explained discharge medications and the need for follow up with the patient/caretakers. This was also printed in the discharge instructions. Patient was discharged with the following medications and follow up:      Medication List      New Prescriptions    furosemide 20 MG tablet  Commonly known as: LASIX  Take 1 tablet by mouth Daily.        Changed    HYDROcodone-acetaminophen 7.5-325 MG per tablet  Commonly known as: NORCO  Take 1 tablet by mouth Every 6 (Six) Hours As Needed for Moderate Pain.  What changed: when to take this           Where to Get Your Medications      These medications were sent to [facility]  #[id number] - [place] - [address] AT [facility] - [phone]  - [phone] FX  [address], [place] [address]    Phone: [phone]   · furosemide 20 MG tablet  · HYDROcodone-acetaminophen 7.5-325 MG per tablet      Valeria Avendano, APRN  [address] [place] [address] [phone]    In 3 days         Final diagnoses:   Pedal edema   Acute deep vein thrombosis (DVT) of calf muscle vein of left lower extremity (HCC)   Pain in both lower extremities        ED Disposition     ED Disposition   Discharge    Condition   Stable    Comment   --             This medical record created using voice recognition software.           Fredy Oleary DO  01/07/23 7421

## 2023-01-13 ENCOUNTER — OFFICE VISIT (OUTPATIENT)
Dept: FAMILY MEDICINE CLINIC | Facility: CLINIC | Age: 63
End: 2023-01-13
Payer: COMMERCIAL

## 2023-01-13 VITALS
HEART RATE: 105 BPM | DIASTOLIC BLOOD PRESSURE: 84 MMHG | OXYGEN SATURATION: 95 % | BODY MASS INDEX: 22.66 KG/M2 | WEIGHT: 153 LBS | SYSTOLIC BLOOD PRESSURE: 138 MMHG | HEIGHT: 69 IN | TEMPERATURE: 97.1 F

## 2023-01-13 DIAGNOSIS — M54.42 CHRONIC BILATERAL LOW BACK PAIN WITH BILATERAL SCIATICA: ICD-10-CM

## 2023-01-13 DIAGNOSIS — G89.29 CHRONIC BILATERAL LOW BACK PAIN WITH BILATERAL SCIATICA: ICD-10-CM

## 2023-01-13 DIAGNOSIS — I82.492 ACUTE DEEP VEIN THROMBOSIS (DVT) OF OTHER SPECIFIED VEIN OF LEFT LOWER EXTREMITY: ICD-10-CM

## 2023-01-13 DIAGNOSIS — N50.811 PAIN IN RIGHT TESTICLE: ICD-10-CM

## 2023-01-13 DIAGNOSIS — Z09 HOSPITAL DISCHARGE FOLLOW-UP: Primary | ICD-10-CM

## 2023-01-13 DIAGNOSIS — I48.92 ATRIAL FLUTTER, UNSPECIFIED TYPE: ICD-10-CM

## 2023-01-13 DIAGNOSIS — M54.41 CHRONIC BILATERAL LOW BACK PAIN WITH BILATERAL SCIATICA: ICD-10-CM

## 2023-01-13 DIAGNOSIS — N43.3 HYDROCELE IN ADULT: ICD-10-CM

## 2023-01-13 DIAGNOSIS — R53.83 FATIGUE, UNSPECIFIED TYPE: ICD-10-CM

## 2023-01-13 DIAGNOSIS — R15.9 INCONTINENCE OF FECES, UNSPECIFIED FECAL INCONTINENCE TYPE: ICD-10-CM

## 2023-01-13 DIAGNOSIS — K22.719 BARRETT'S ESOPHAGUS WITH DYSPLASIA: ICD-10-CM

## 2023-01-13 DIAGNOSIS — T14.91XA SUICIDE ATTEMPT: ICD-10-CM

## 2023-01-13 DIAGNOSIS — G62.9 PERIPHERAL POLYNEUROPATHY: ICD-10-CM

## 2023-01-13 DIAGNOSIS — I26.99 ACUTE PULMONARY EMBOLISM WITHOUT ACUTE COR PULMONALE, UNSPECIFIED PULMONARY EMBOLISM TYPE: ICD-10-CM

## 2023-01-13 DIAGNOSIS — F32.A DEPRESSION, UNSPECIFIED DEPRESSION TYPE: ICD-10-CM

## 2023-01-13 LAB
BASOPHILS # BLD AUTO: 0.05 10*3/MM3 (ref 0–0.2)
BASOPHILS NFR BLD AUTO: 0.8 % (ref 0–1.5)
DEPRECATED RDW RBC AUTO: 43.3 FL (ref 37–54)
EOSINOPHIL # BLD AUTO: 0.3 10*3/MM3 (ref 0–0.4)
EOSINOPHIL NFR BLD AUTO: 5 % (ref 0.3–6.2)
ERYTHROCYTE [DISTWIDTH] IN BLOOD BY AUTOMATED COUNT: 13.7 % (ref 12.3–15.4)
HCT VFR BLD AUTO: 37.7 % (ref 37.5–51)
HGB BLD-MCNC: 11.7 G/DL (ref 13–17.7)
IMM GRANULOCYTES # BLD AUTO: 0.01 10*3/MM3 (ref 0–0.05)
IMM GRANULOCYTES NFR BLD AUTO: 0.2 % (ref 0–0.5)
LYMPHOCYTES # BLD AUTO: 2.48 10*3/MM3 (ref 0.7–3.1)
LYMPHOCYTES NFR BLD AUTO: 41.1 % (ref 19.6–45.3)
MCH RBC QN AUTO: 27.3 PG (ref 26.6–33)
MCHC RBC AUTO-ENTMCNC: 31 G/DL (ref 31.5–35.7)
MCV RBC AUTO: 87.9 FL (ref 79–97)
MONOCYTES # BLD AUTO: 0.55 10*3/MM3 (ref 0.1–0.9)
MONOCYTES NFR BLD AUTO: 9.1 % (ref 5–12)
NEUTROPHILS NFR BLD AUTO: 2.64 10*3/MM3 (ref 1.7–7)
NEUTROPHILS NFR BLD AUTO: 43.8 % (ref 42.7–76)
NRBC BLD AUTO-RTO: 0 /100 WBC (ref 0–0.2)
PLATELET # BLD AUTO: 476 10*3/MM3 (ref 140–450)
PMV BLD AUTO: 10.6 FL (ref 6–12)
RBC # BLD AUTO: 4.29 10*6/MM3 (ref 4.14–5.8)
WBC NRBC COR # BLD: 6.03 10*3/MM3 (ref 3.4–10.8)

## 2023-01-13 PROCEDURE — 80053 COMPREHEN METABOLIC PANEL: CPT | Performed by: FAMILY MEDICINE

## 2023-01-13 PROCEDURE — 84443 ASSAY THYROID STIM HORMONE: CPT | Performed by: FAMILY MEDICINE

## 2023-01-13 PROCEDURE — 82607 VITAMIN B-12: CPT | Performed by: FAMILY MEDICINE

## 2023-01-13 PROCEDURE — 82746 ASSAY OF FOLIC ACID SERUM: CPT | Performed by: FAMILY MEDICINE

## 2023-01-13 PROCEDURE — 84439 ASSAY OF FREE THYROXINE: CPT | Performed by: FAMILY MEDICINE

## 2023-01-13 PROCEDURE — 85025 COMPLETE CBC W/AUTO DIFF WBC: CPT | Performed by: FAMILY MEDICINE

## 2023-01-13 PROCEDURE — 99214 OFFICE O/P EST MOD 30 MIN: CPT | Performed by: FAMILY MEDICINE

## 2023-01-13 RX ORDER — TIZANIDINE 4 MG/1
4 TABLET ORAL EVERY 8 HOURS PRN
Qty: 42 TABLET | Refills: 0 | Status: SHIPPED | OUTPATIENT
Start: 2023-01-13

## 2023-01-13 RX ORDER — APIXABAN 5 MG/1
5 TABLET, FILM COATED ORAL DAILY
COMMUNITY
Start: 2022-12-30 | End: 2023-01-13 | Stop reason: SDUPTHER

## 2023-01-13 RX ORDER — AMITRIPTYLINE HYDROCHLORIDE 10 MG/1
10 TABLET, FILM COATED ORAL NIGHTLY
Qty: 30 TABLET | Refills: 0 | Status: SHIPPED | OUTPATIENT
Start: 2023-01-13 | End: 2023-03-07

## 2023-01-13 RX ORDER — PANTOPRAZOLE SODIUM 40 MG/1
40 TABLET, DELAYED RELEASE ORAL DAILY
Qty: 30 TABLET | Refills: 2 | Status: SHIPPED | OUTPATIENT
Start: 2023-01-13

## 2023-01-13 RX ORDER — FERROUS SULFATE 325(65) MG
325 TABLET ORAL DAILY
COMMUNITY
Start: 2022-12-30 | End: 2023-03-07

## 2023-01-13 RX ORDER — LIDOCAINE 50 MG/G
1 PATCH TOPICAL EVERY 24 HOURS
Qty: 30 PATCH | Refills: 1 | Status: SHIPPED | OUTPATIENT
Start: 2023-01-13 | End: 2023-03-07

## 2023-01-13 RX ORDER — DULOXETIN HYDROCHLORIDE 60 MG/1
60 CAPSULE, DELAYED RELEASE ORAL
COMMUNITY
Start: 2022-12-30 | End: 2023-01-13 | Stop reason: SDUPTHER

## 2023-01-13 RX ORDER — GABAPENTIN 300 MG/1
900 CAPSULE ORAL
COMMUNITY
Start: 2022-12-30 | End: 2023-01-19 | Stop reason: SDUPTHER

## 2023-01-13 RX ORDER — DULOXETIN HYDROCHLORIDE 60 MG/1
60 CAPSULE, DELAYED RELEASE ORAL DAILY
Qty: 30 CAPSULE | Refills: 5 | Status: SHIPPED | OUTPATIENT
Start: 2023-01-13

## 2023-01-13 RX ORDER — APIXABAN 5 MG/1
5 TABLET, FILM COATED ORAL DAILY
Qty: 60 TABLET | Refills: 5 | Status: SHIPPED | OUTPATIENT
Start: 2023-01-13

## 2023-01-13 NOTE — PROGRESS NOTES
Venipuncture Blood Specimen Collection  Venipuncture performed in left arm by Litzy Mohan with good hemostasis. Patient tolerated the procedure well without complications.   01/13/23   Litzy Mohan
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
## 2023-01-14 LAB
ALBUMIN SERPL-MCNC: 4.7 G/DL (ref 3.5–5.2)
ALBUMIN/GLOB SERPL: 1.6 G/DL
ALP SERPL-CCNC: 87 U/L (ref 39–117)
ALT SERPL W P-5'-P-CCNC: 16 U/L (ref 1–41)
ANION GAP SERPL CALCULATED.3IONS-SCNC: 13 MMOL/L (ref 5–15)
AST SERPL-CCNC: 20 U/L (ref 1–40)
BILIRUB SERPL-MCNC: 0.4 MG/DL (ref 0–1.2)
BUN SERPL-MCNC: 8 MG/DL (ref 8–23)
BUN/CREAT SERPL: 8.2 (ref 7–25)
CALCIUM SPEC-SCNC: 10.4 MG/DL (ref 8.6–10.5)
CHLORIDE SERPL-SCNC: 95 MMOL/L (ref 98–107)
CO2 SERPL-SCNC: 28 MMOL/L (ref 22–29)
CREAT SERPL-MCNC: 0.98 MG/DL (ref 0.76–1.27)
EGFRCR SERPLBLD CKD-EPI 2021: 87.7 ML/MIN/1.73
FOLATE SERPL-MCNC: 8.93 NG/ML (ref 4.78–24.2)
GLOBULIN UR ELPH-MCNC: 2.9 GM/DL
GLUCOSE SERPL-MCNC: 86 MG/DL (ref 65–99)
POTASSIUM SERPL-SCNC: 4.5 MMOL/L (ref 3.5–5.2)
PROT SERPL-MCNC: 7.6 G/DL (ref 6–8.5)
SODIUM SERPL-SCNC: 136 MMOL/L (ref 136–145)
T4 FREE SERPL-MCNC: 1.29 NG/DL (ref 0.93–1.7)
TSH SERPL DL<=0.05 MIU/L-ACNC: 0.54 UIU/ML (ref 0.27–4.2)
VIT B12 BLD-MCNC: 452 PG/ML (ref 211–946)

## 2023-01-17 ENCOUNTER — TELEPHONE (OUTPATIENT)
Dept: GASTROENTEROLOGY | Facility: CLINIC | Age: 63
End: 2023-01-17
Payer: COMMERCIAL

## 2023-01-19 ENCOUNTER — OFFICE VISIT (OUTPATIENT)
Dept: FAMILY MEDICINE CLINIC | Facility: CLINIC | Age: 63
End: 2023-01-19
Payer: COMMERCIAL

## 2023-01-19 VITALS
OXYGEN SATURATION: 94 % | HEART RATE: 88 BPM | BODY MASS INDEX: 24.59 KG/M2 | DIASTOLIC BLOOD PRESSURE: 76 MMHG | HEIGHT: 66 IN | WEIGHT: 153 LBS | SYSTOLIC BLOOD PRESSURE: 126 MMHG | TEMPERATURE: 96.8 F

## 2023-01-19 DIAGNOSIS — Z87.828 HISTORY OF INTENTIONAL GUNSHOT INJURY: ICD-10-CM

## 2023-01-19 DIAGNOSIS — Z01.89 EXAMINATION OF, LABORATORY: ICD-10-CM

## 2023-01-19 DIAGNOSIS — Z71.85 VACCINE COUNSELING: ICD-10-CM

## 2023-01-19 DIAGNOSIS — M51.36 DDD (DEGENERATIVE DISC DISEASE), LUMBAR: ICD-10-CM

## 2023-01-19 DIAGNOSIS — M54.40 BACK PAIN OF LUMBAR REGION WITH SCIATICA: ICD-10-CM

## 2023-01-19 DIAGNOSIS — Z90.81 H/O SPLENECTOMY: ICD-10-CM

## 2023-01-19 DIAGNOSIS — Z02.9 ADMINISTRATIVE ENCOUNTER: Primary | ICD-10-CM

## 2023-01-19 PROCEDURE — 90621 MENB-FHBP VACC 2/3 DOSE IM: CPT | Performed by: NURSE PRACTITIONER

## 2023-01-19 PROCEDURE — 90472 IMMUNIZATION ADMIN EACH ADD: CPT | Performed by: NURSE PRACTITIONER

## 2023-01-19 PROCEDURE — 90734 MENACWYD/MENACWYCRM VACC IM: CPT | Performed by: NURSE PRACTITIONER

## 2023-01-19 PROCEDURE — 99215 OFFICE O/P EST HI 40 MIN: CPT | Performed by: NURSE PRACTITIONER

## 2023-01-19 PROCEDURE — 90471 IMMUNIZATION ADMIN: CPT | Performed by: NURSE PRACTITIONER

## 2023-01-19 RX ORDER — GABAPENTIN 300 MG/1
300 CAPSULE ORAL 3 TIMES DAILY
Qty: 90 CAPSULE | Refills: 0 | Status: SHIPPED | OUTPATIENT
Start: 2023-01-19 | End: 2023-02-18
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
## 2023-01-28 ENCOUNTER — TELEPHONE (OUTPATIENT)
Dept: FAMILY MEDICINE CLINIC | Facility: CLINIC | Age: 63
End: 2023-01-28
Payer: COMMERCIAL

## 2023-01-28 DIAGNOSIS — K59.00 CONSTIPATION, UNSPECIFIED CONSTIPATION TYPE: ICD-10-CM

## 2023-01-28 NOTE — TELEPHONE ENCOUNTER
"Patient asking about a prep kit(colon cleanse) to do prior to his MD appointments. He typically fasts 3 days due to he has bowel issues.  Patient wanting something to \"cleanse/empy bowels\" prior to visit so he doesn't have to fast 3 days prior to an appointment.  Also how often can he do this? Can this be harmful doing too much?  Please call -they have multiple questions.  If no answer leave detailed message please  "

## 2023-01-29 DIAGNOSIS — G89.29 CHRONIC BILATERAL LOW BACK PAIN WITH BILATERAL SCIATICA: ICD-10-CM

## 2023-01-29 DIAGNOSIS — M54.42 CHRONIC BILATERAL LOW BACK PAIN WITH BILATERAL SCIATICA: ICD-10-CM

## 2023-01-29 DIAGNOSIS — M54.41 CHRONIC BILATERAL LOW BACK PAIN WITH BILATERAL SCIATICA: ICD-10-CM

## 2023-01-30 RX ORDER — FUROSEMIDE 20 MG/1
20 TABLET ORAL DAILY
Qty: 10 TABLET | Refills: 0 | Status: SHIPPED | OUTPATIENT
Start: 2023-01-30 | End: 2023-03-07

## 2023-01-30 RX ORDER — POLYETHYLENE GLYCOL 3350 17 G/17G
17 POWDER, FOR SOLUTION ORAL DAILY
Qty: 30 EACH | Refills: 2 | Status: SHIPPED | OUTPATIENT
Start: 2023-01-30 | End: 2023-03-07

## 2023-01-30 RX ORDER — AMITRIPTYLINE HYDROCHLORIDE 10 MG/1
10 TABLET, FILM COATED ORAL NIGHTLY
Qty: 30 TABLET | Refills: 0 | OUTPATIENT
Start: 2023-01-30

## 2023-02-06 RX ORDER — FUROSEMIDE 20 MG/1
20 TABLET ORAL DAILY
Qty: 10 TABLET | Refills: 0 | OUTPATIENT
Start: 2023-02-06

## 2023-03-06 ENCOUNTER — TELEPHONE (OUTPATIENT)
Dept: UROLOGY | Facility: CLINIC | Age: 63
End: 2023-03-06

## 2023-03-06 NOTE — TELEPHONE ENCOUNTER
Caller: FORTINO GERMAN     Relationship: SIGNIFICANT OTHER     Best call back number: [phone]    INCOMING CALL FROM PT. PT HAD A REFERRAL SENT IN January, PT DIDN'T WANT TO SCHEDULE AT THE TIME, NOW CALLING BACK WANTING TO SCHEDULE. CHECKED STEVE JAIME'S,. AND NITZA'S SCHEDULES, NOTHING AVAILABLE UNTIL April 3RD. PT EXPRESSED HE IS IN TOO MUCH PAIN AND DISCOMFORT TO WAIT THAT LONG. ADVISED PT TO FOLLOW UP WITH PCP OR URGENT CARE AND I WOULD SEND MESSAGE TO CLINICAL.

## 2023-03-07 ENCOUNTER — OFFICE VISIT (OUTPATIENT)
Dept: FAMILY MEDICINE CLINIC | Facility: CLINIC | Age: 63
End: 2023-03-07
Payer: COMMERCIAL

## 2023-03-07 VITALS
DIASTOLIC BLOOD PRESSURE: 74 MMHG | WEIGHT: 145.2 LBS | SYSTOLIC BLOOD PRESSURE: 132 MMHG | BODY MASS INDEX: 21.51 KG/M2 | OXYGEN SATURATION: 98 % | TEMPERATURE: 98 F | HEART RATE: 109 BPM | HEIGHT: 69 IN

## 2023-03-07 DIAGNOSIS — N40.1 BENIGN PROSTATIC HYPERPLASIA WITH LOWER URINARY TRACT SYMPTOMS, SYMPTOM DETAILS UNSPECIFIED: ICD-10-CM

## 2023-03-07 DIAGNOSIS — N50.819 PAIN IN TESTICLE, UNSPECIFIED LATERALITY: Primary | ICD-10-CM

## 2023-03-07 DIAGNOSIS — N48.89 PENILE PAIN: ICD-10-CM

## 2023-03-07 PROBLEM — D64.9 ANEMIA: Status: ACTIVE | Noted: 2023-03-07

## 2023-03-07 PROBLEM — Z22.322 MRSA (METHICILLIN RESISTANT STAPH AUREUS) CULTURE POSITIVE: Status: ACTIVE | Noted: 2023-03-07

## 2023-03-07 PROCEDURE — 87086 URINE CULTURE/COLONY COUNT: CPT | Performed by: FAMILY MEDICINE

## 2023-03-07 PROCEDURE — 99214 OFFICE O/P EST MOD 30 MIN: CPT | Performed by: FAMILY MEDICINE

## 2023-03-07 RX ORDER — TAMSULOSIN HYDROCHLORIDE 0.4 MG/1
CAPSULE ORAL
COMMUNITY
Start: 2023-01-29 | End: 2023-03-07 | Stop reason: SDUPTHER

## 2023-03-07 RX ORDER — ACETAMINOPHEN AND CODEINE PHOSPHATE 300; 30 MG/1; MG/1
1 TABLET ORAL EVERY 4 HOURS PRN
Qty: 15 TABLET | Refills: 0 | Status: SHIPPED | OUTPATIENT
Start: 2023-03-07

## 2023-03-07 RX ORDER — TAMSULOSIN HYDROCHLORIDE 0.4 MG/1
1 CAPSULE ORAL DAILY
Qty: 30 CAPSULE | Refills: 0 | Status: SHIPPED | OUTPATIENT
Start: 2023-03-07 | End: 2023-04-03
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
## 2023-03-09 LAB — BACTERIA SPEC AEROBE CULT: NO GROWTH

## 2023-03-15 ENCOUNTER — HOSPITAL ENCOUNTER (OUTPATIENT)
Dept: ULTRASOUND IMAGING | Facility: HOSPITAL | Age: 63
Discharge: HOME | End: 2023-03-15
Admitting: FAMILY MEDICINE
Payer: COMMERCIAL

## 2023-03-15 DIAGNOSIS — N50.819 PAIN IN TESTICLE, UNSPECIFIED LATERALITY: ICD-10-CM

## 2023-03-15 PROCEDURE — 76870 US EXAM SCROTUM: CPT
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
## 2023-04-03 RX ORDER — TAMSULOSIN HYDROCHLORIDE 0.4 MG/1
1 CAPSULE ORAL DAILY
Qty: 30 CAPSULE | Refills: 0 | Status: SHIPPED | OUTPATIENT
Start: 2023-04-03 | End: 2023-04-04 | Stop reason: SDUPTHER

## 2023-04-04 ENCOUNTER — OFFICE VISIT (OUTPATIENT)
Dept: UROLOGY | Facility: CLINIC | Age: 63
End: 2023-04-04
Payer: COMMERCIAL

## 2023-04-04 VITALS — RESPIRATION RATE: 16 BRPM | BODY MASS INDEX: 21.38 KG/M2 | HEIGHT: 67 IN | WEIGHT: 136.2 LBS

## 2023-04-04 DIAGNOSIS — G89.29 PENILE PAIN, CHRONIC: Primary | ICD-10-CM

## 2023-04-04 DIAGNOSIS — N41.0 ACUTE PROSTATITIS: ICD-10-CM

## 2023-04-04 DIAGNOSIS — N43.40 SPERMATOCELE: ICD-10-CM

## 2023-04-04 DIAGNOSIS — N48.89 PENILE PAIN, CHRONIC: Primary | ICD-10-CM

## 2023-04-04 DIAGNOSIS — N40.1 BENIGN PROSTATIC HYPERPLASIA WITH LOWER URINARY TRACT SYMPTOMS, SYMPTOM DETAILS UNSPECIFIED: ICD-10-CM

## 2023-04-04 DIAGNOSIS — N43.2 OTHER HYDROCELE: ICD-10-CM

## 2023-04-04 LAB — URINE VOLUME: 249

## 2023-04-04 PROCEDURE — 1159F MED LIST DOCD IN RCRD: CPT | Performed by: NURSE PRACTITIONER

## 2023-04-04 PROCEDURE — 1160F RVW MEDS BY RX/DR IN RCRD: CPT | Performed by: NURSE PRACTITIONER

## 2023-04-04 PROCEDURE — 99214 OFFICE O/P EST MOD 30 MIN: CPT | Performed by: NURSE PRACTITIONER

## 2023-04-04 PROCEDURE — 51798 US URINE CAPACITY MEASURE: CPT | Performed by: NURSE PRACTITIONER

## 2023-04-04 RX ORDER — TAMSULOSIN HYDROCHLORIDE 0.4 MG/1
2 CAPSULE ORAL DAILY
Qty: 180 CAPSULE | Refills: 3 | Status: SHIPPED | OUTPATIENT
Start: 2023-04-04 | End: 2024-03-29

## 2023-04-04 RX ORDER — FINASTERIDE 5 MG/1
5 TABLET, FILM COATED ORAL DAILY
Qty: 90 TABLET | Refills: 3 | Status: SHIPPED | OUTPATIENT
Start: 2023-04-04 | End: 2024-03-29

## 2023-04-04 RX ORDER — DOXYCYCLINE HYCLATE 100 MG/1
100 CAPSULE ORAL 2 TIMES DAILY
Qty: 42 CAPSULE | Refills: 0 | Status: SHIPPED | OUTPATIENT
Start: 2023-04-04 | End: 2023-04-25

## 2023-04-26 DIAGNOSIS — K22.719 BARRETT'S ESOPHAGUS WITH DYSPLASIA: ICD-10-CM

## 2023-04-26 RX ORDER — PANTOPRAZOLE SODIUM 40 MG/1
40 TABLET, DELAYED RELEASE ORAL DAILY
Qty: 30 TABLET | Refills: 2 | Status: SHIPPED | OUTPATIENT
Start: 2023-04-26
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
## 2023-08-10 ENCOUNTER — OFFICE VISIT (OUTPATIENT)
Dept: UROLOGY | Facility: CLINIC | Age: 63
End: 2023-08-10
Payer: COMMERCIAL

## 2023-08-10 VITALS — WEIGHT: 143 LBS | HEIGHT: 67 IN | RESPIRATION RATE: 18 BRPM | BODY MASS INDEX: 22.44 KG/M2

## 2023-08-10 DIAGNOSIS — N40.1 BENIGN PROSTATIC HYPERPLASIA WITH LOWER URINARY TRACT SYMPTOMS, SYMPTOM DETAILS UNSPECIFIED: Primary | ICD-10-CM

## 2023-08-10 LAB — URINE VOLUME: 150

## 2023-08-10 PROCEDURE — 1160F RVW MEDS BY RX/DR IN RCRD: CPT | Performed by: NURSE PRACTITIONER

## 2023-08-10 PROCEDURE — 1159F MED LIST DOCD IN RCRD: CPT | Performed by: NURSE PRACTITIONER

## 2023-08-10 PROCEDURE — 99213 OFFICE O/P EST LOW 20 MIN: CPT | Performed by: NURSE PRACTITIONER

## 2023-09-29 DIAGNOSIS — G89.29 CHRONIC BILATERAL LOW BACK PAIN WITH BILATERAL SCIATICA: ICD-10-CM

## 2023-09-29 DIAGNOSIS — M54.41 CHRONIC BILATERAL LOW BACK PAIN WITH BILATERAL SCIATICA: ICD-10-CM

## 2023-09-29 DIAGNOSIS — M54.42 CHRONIC BILATERAL LOW BACK PAIN WITH BILATERAL SCIATICA: ICD-10-CM

## 2023-09-29 RX ORDER — TIZANIDINE 4 MG/1
TABLET ORAL
Qty: 42 TABLET | Refills: 0 | OUTPATIENT
Start: 2023-09-29

## 2023-10-05 DIAGNOSIS — M54.41 CHRONIC BILATERAL LOW BACK PAIN WITH BILATERAL SCIATICA: ICD-10-CM

## 2023-10-05 DIAGNOSIS — M54.42 CHRONIC BILATERAL LOW BACK PAIN WITH BILATERAL SCIATICA: ICD-10-CM

## 2023-10-05 DIAGNOSIS — G89.29 CHRONIC BILATERAL LOW BACK PAIN WITH BILATERAL SCIATICA: ICD-10-CM

## 2023-10-05 RX ORDER — TIZANIDINE 4 MG/1
TABLET ORAL
Qty: 42 TABLET | Refills: 0 | OUTPATIENT
Start: 2023-10-05

## 2023-10-10 DIAGNOSIS — M54.41 CHRONIC BILATERAL LOW BACK PAIN WITH BILATERAL SCIATICA: ICD-10-CM

## 2023-10-10 DIAGNOSIS — M54.42 CHRONIC BILATERAL LOW BACK PAIN WITH BILATERAL SCIATICA: ICD-10-CM

## 2023-10-10 DIAGNOSIS — G89.29 CHRONIC BILATERAL LOW BACK PAIN WITH BILATERAL SCIATICA: ICD-10-CM

## 2023-10-10 RX ORDER — TIZANIDINE 4 MG/1
TABLET ORAL
Qty: 30 TABLET | OUTPATIENT
Start: 2023-10-10

## 2023-10-13 DIAGNOSIS — F32.A DEPRESSION, UNSPECIFIED DEPRESSION TYPE: ICD-10-CM

## 2023-10-13 RX ORDER — DULOXETIN HYDROCHLORIDE 60 MG/1
60 CAPSULE, DELAYED RELEASE ORAL DAILY
Qty: 30 CAPSULE | Refills: 5 | OUTPATIENT
Start: 2023-10-13

## 2023-10-19 DIAGNOSIS — K22.719 BARRETT'S ESOPHAGUS WITH DYSPLASIA: ICD-10-CM

## 2023-10-19 RX ORDER — PANTOPRAZOLE SODIUM 40 MG/1
40 TABLET, DELAYED RELEASE ORAL DAILY
Qty: 30 TABLET | Refills: 2 | OUTPATIENT
Start: 2023-10-19

## 2023-11-06 ENCOUNTER — OFFICE VISIT (OUTPATIENT)
Dept: FAMILY MEDICINE CLINIC | Facility: CLINIC | Age: 63
End: 2023-11-06
Payer: COMMERCIAL

## 2023-11-06 VITALS
HEIGHT: 67 IN | TEMPERATURE: 99.1 F | BODY MASS INDEX: 23.45 KG/M2 | DIASTOLIC BLOOD PRESSURE: 88 MMHG | SYSTOLIC BLOOD PRESSURE: 138 MMHG | WEIGHT: 149.4 LBS | HEART RATE: 108 BPM | OXYGEN SATURATION: 97 %

## 2023-11-06 DIAGNOSIS — R50.9 FEVER, UNSPECIFIED FEVER CAUSE: ICD-10-CM

## 2023-11-06 DIAGNOSIS — R05.1 ACUTE COUGH: Primary | ICD-10-CM

## 2023-11-06 DIAGNOSIS — R07.9 CHEST PAIN, UNSPECIFIED TYPE: ICD-10-CM

## 2023-11-06 DIAGNOSIS — I82.492 ACUTE DEEP VEIN THROMBOSIS (DVT) OF OTHER SPECIFIED VEIN OF LEFT LOWER EXTREMITY: ICD-10-CM

## 2023-11-06 DIAGNOSIS — J06.9 UPPER RESPIRATORY TRACT INFECTION, UNSPECIFIED TYPE: ICD-10-CM

## 2023-11-06 PROBLEM — K21.9 GASTRO-ESOPHAGEAL REFLUX DISEASE WITHOUT ESOPHAGITIS: Status: ACTIVE | Noted: 2023-11-06

## 2023-11-06 LAB
ALBUMIN SERPL-MCNC: 4.9 G/DL (ref 3.5–5.2)
ALBUMIN/GLOB SERPL: 2.3 G/DL
ALP SERPL-CCNC: 64 U/L (ref 39–117)
ALT SERPL W P-5'-P-CCNC: 11 U/L (ref 1–41)
ANION GAP SERPL CALCULATED.3IONS-SCNC: 10.5 MMOL/L (ref 5–15)
AST SERPL-CCNC: 16 U/L (ref 1–40)
BASOPHILS # BLD AUTO: 0.04 10*3/MM3 (ref 0–0.2)
BASOPHILS NFR BLD AUTO: 0.4 % (ref 0–1.5)
BILIRUB SERPL-MCNC: 0.8 MG/DL (ref 0–1.2)
BUN SERPL-MCNC: 15 MG/DL (ref 8–23)
BUN/CREAT SERPL: 16.9 (ref 7–25)
CALCIUM SPEC-SCNC: 9.8 MG/DL (ref 8.6–10.5)
CHLORIDE SERPL-SCNC: 103 MMOL/L (ref 98–107)
CO2 SERPL-SCNC: 23.5 MMOL/L (ref 22–29)
CREAT SERPL-MCNC: 0.89 MG/DL (ref 0.76–1.27)
D DIMER PPP FEU-MCNC: <0.27 MCGFEU/ML (ref 0–0.62)
DEPRECATED RDW RBC AUTO: 39.4 FL (ref 37–54)
EGFRCR SERPLBLD CKD-EPI 2021: 96.9 ML/MIN/1.73
EOSINOPHIL # BLD AUTO: 0.11 10*3/MM3 (ref 0–0.4)
EOSINOPHIL NFR BLD AUTO: 1.1 % (ref 0.3–6.2)
ERYTHROCYTE [DISTWIDTH] IN BLOOD BY AUTOMATED COUNT: 12.4 % (ref 12.3–15.4)
EXPIRATION DATE: NORMAL
FLUAV AG UPPER RESP QL IA.RAPID: NOT DETECTED
FLUBV AG UPPER RESP QL IA.RAPID: NOT DETECTED
GLOBULIN UR ELPH-MCNC: 2.1 GM/DL
GLUCOSE SERPL-MCNC: 99 MG/DL (ref 65–99)
HCT VFR BLD AUTO: 37.6 % (ref 37.5–51)
HGB BLD-MCNC: 12.9 G/DL (ref 13–17.7)
IMM GRANULOCYTES # BLD AUTO: 0.03 10*3/MM3 (ref 0–0.05)
IMM GRANULOCYTES NFR BLD AUTO: 0.3 % (ref 0–0.5)
INTERNAL CONTROL: NORMAL
LYMPHOCYTES # BLD AUTO: 2.56 10*3/MM3 (ref 0.7–3.1)
LYMPHOCYTES NFR BLD AUTO: 24.7 % (ref 19.6–45.3)
Lab: NORMAL
MAGNESIUM SERPL-MCNC: 2.1 MG/DL (ref 1.6–2.4)
MCH RBC QN AUTO: 29.9 PG (ref 26.6–33)
MCHC RBC AUTO-ENTMCNC: 34.3 G/DL (ref 31.5–35.7)
MCV RBC AUTO: 87.2 FL (ref 79–97)
MONOCYTES # BLD AUTO: 0.62 10*3/MM3 (ref 0.1–0.9)
MONOCYTES NFR BLD AUTO: 6 % (ref 5–12)
NEUTROPHILS NFR BLD AUTO: 67.5 % (ref 42.7–76)
NEUTROPHILS NFR BLD AUTO: 7.01 10*3/MM3 (ref 1.7–7)
NRBC BLD AUTO-RTO: 0 /100 WBC (ref 0–0.2)
PLATELET # BLD AUTO: 318 10*3/MM3 (ref 140–450)
PMV BLD AUTO: 11.1 FL (ref 6–12)
POTASSIUM SERPL-SCNC: 4.2 MMOL/L (ref 3.5–5.2)
PROT SERPL-MCNC: 7 G/DL (ref 6–8.5)
RBC # BLD AUTO: 4.31 10*6/MM3 (ref 4.14–5.8)
SARS-COV-2 AG UPPER RESP QL IA.RAPID: NOT DETECTED
SODIUM SERPL-SCNC: 137 MMOL/L (ref 136–145)
TROPONIN T SERPL HS-MCNC: <6 NG/L
WBC NRBC COR # BLD: 10.37 10*3/MM3 (ref 3.4–10.8)

## 2023-11-06 PROCEDURE — 85025 COMPLETE CBC W/AUTO DIFF WBC: CPT | Performed by: FAMILY MEDICINE

## 2023-11-06 PROCEDURE — 83735 ASSAY OF MAGNESIUM: CPT | Performed by: FAMILY MEDICINE

## 2023-11-06 PROCEDURE — 85379 FIBRIN DEGRADATION QUANT: CPT | Performed by: FAMILY MEDICINE

## 2023-11-06 PROCEDURE — 80053 COMPREHEN METABOLIC PANEL: CPT | Performed by: FAMILY MEDICINE

## 2023-11-06 PROCEDURE — 84484 ASSAY OF TROPONIN QUANT: CPT | Performed by: FAMILY MEDICINE

## 2023-11-06 RX ORDER — SACCHAROMYCES BOULARDII 250 MG
250 CAPSULE ORAL 2 TIMES DAILY
Qty: 20 CAPSULE | Refills: 0 | Status: SHIPPED | OUTPATIENT
Start: 2023-11-06 | End: 2023-11-07 | Stop reason: SDUPTHER

## 2023-11-06 RX ORDER — CEFDINIR 300 MG/1
300 CAPSULE ORAL 2 TIMES DAILY
Qty: 14 CAPSULE | Refills: 0 | Status: SHIPPED | OUTPATIENT
Start: 2023-11-06 | End: 2023-11-07 | Stop reason: SDUPTHER

## 2023-11-06 RX ORDER — APIXABAN 5 MG/1
5 TABLET, FILM COATED ORAL DAILY
Start: 2023-11-06 | End: 2023-11-07 | Stop reason: SDUPTHER

## 2023-11-06 NOTE — PROGRESS NOTES
Chief Complaint  Fever and Cough    Subjective          Yoni Aleman presents to [facility]  History of Present Illness  Pt has had intermittent chest pain for over 1 yr  URI   This is a new problem. Episode onset: 3 days. The problem has been unchanged. The maximum temperature recorded prior to his arrival was 101 - 101.9 F. Associated symptoms include congestion and joint pain. Pertinent negatives include no diarrhea, dysuria, ear pain, joint swelling, neck pain, plugged ear sensation, rash, rhinorrhea, sinus pain, sneezing, sore throat, swollen glands or wheezing.       BMI is within normal parameters. No other follow-up for BMI required.       Objective   No Known Allergies  Immunization History   Administered Date(s) Administered    COVID-19 (MODERNA) 1st,2nd,3rd Dose Monovalent 06/16/2021, 07/16/2021    COVID-19 (MODERNA) BIVALENT 12+YRS 11/07/2022    COVID-19 (MODERNA) Monovalent Original Booster 01/28/2022, 11/07/2022    COVID-19 (UNSPECIFIED) 06/16/2021, 07/16/2021, 01/28/2022    Fluzone (or Fluarix & Flulaval for VFC) >6mos 11/07/2022    Fluzone Quad >6mos (Multi-dose) 12/06/2018, 10/24/2019, 12/17/2020    Hib (PRP-T) 11/21/2022    Influenza Injectable Mdck Pf Quad 10/17/2017    Influenza Seasonal Injectable 01/28/2022    Influenza, Unspecified 10/17/2017, 01/28/2022    Meningococcal ACYW (MENQUADFI) 11/21/2022    Meningococcal B,(Bexsero) 11/21/2022    Meningococcal Conjugate 01/19/2023    Meningococcal MCV4P (Menactra) 11/21/2022    Pneumococcal Conjugate 13-Valent (PCV13) 11/21/2022    Pneumococcal Conjugate 20-Valent (PCV20) 01/19/2023    Tdap 02/05/2017    Trumenba(meningococcal B) 01/19/2023     Past Medical History:   Diagnosis Date    Deep vein blood clot of left lower extremity     Gastric ulcer     Sciatic nerve pain       Past Surgical History:   Procedure Laterality Date    SPLENECTOMY        Social History     Socioeconomic History    Marital status: Single  "  Tobacco Use    Smoking status: Former     Packs/day: 2     Types: Cigarettes     Start date:      Quit date:      Years since quittin.8     Passive exposure: Past    Smokeless tobacco: Never   Vaping Use    Vaping Use: Never used   Substance and Sexual Activity    Alcohol use: Not Currently    Drug use: Not Currently    Sexual activity: Yes     Partners: Female        Current Outpatient Medications:     Eliquis 5 MG tablet tablet, Take 1 tablet by mouth Daily., Disp: , Rfl:     tamsulosin (FLOMAX) 0.4 MG capsule 24 hr capsule, Take 2 capsules by mouth Daily for 360 days., Disp: 180 capsule, Rfl: 3    cefdinir (OMNICEF) 300 MG capsule, Take 1 capsule by mouth 2 (Two) Times a Day for 7 days., Disp: 14 capsule, Rfl: 0    gabapentin (NEURONTIN) 300 MG capsule, Take 1 capsule by mouth 3 (Three) Times a Day for 30 days., Disp: 90 capsule, Rfl: 0    saccharomyces boulardii (Florastor) 250 MG capsule, Take 1 capsule by mouth 2 (Two) Times a Day for 10 days., Disp: 20 capsule, Rfl: 0   Family History   Problem Relation Age of Onset    Mental illness Mother     Hypertension Mother     Alzheimer's disease Mother           Vital Signs:   Vitals:    23 1355 23 1406   BP: 140/90 138/88   BP Location: Left arm    Patient Position: Sitting    Cuff Size: Adult    Pulse: 108    Temp: 99.1 °F (37.3 °C)    SpO2: 97%    Weight: 67.8 kg (149 lb 6.4 oz)    Height: 170.2 cm (67\")        Review of Systems   HENT:  Positive for congestion. Negative for ear pain, rhinorrhea, sinus pain, sneezing and sore throat.    Respiratory:  Negative for wheezing.    Gastrointestinal:  Negative for diarrhea.   Genitourinary:  Negative for dysuria.   Musculoskeletal:  Positive for joint pain. Negative for neck pain.   Skin:  Negative for rash.      Physical Exam  Vitals reviewed.   Constitutional:       Appearance: Normal appearance. He is well-developed.   HENT:      Head: Normocephalic and atraumatic.      Right Ear: Tympanic " membrane, ear canal and external ear normal.      Left Ear: Tympanic membrane, ear canal and external ear normal.      Nose: Nose normal.      Mouth/Throat:      Mouth: Mucous membranes are moist.      Pharynx: Oropharynx is clear. Posterior oropharyngeal erythema present. No oropharyngeal exudate.   Eyes:      Conjunctiva/sclera: Conjunctivae normal.      Pupils: Pupils are equal, round, and reactive to light.   Cardiovascular:      Rate and Rhythm: Normal rate and regular rhythm.      Pulses: Normal pulses.      Heart sounds: Normal heart sounds. No murmur heard.     No friction rub. No gallop.   Pulmonary:      Effort: Pulmonary effort is normal.      Breath sounds: Normal breath sounds. No wheezing or rhonchi.   Abdominal:      General: Abdomen is flat. Bowel sounds are normal. There is no distension.      Palpations: Abdomen is soft. There is no mass.      Tenderness: There is no abdominal tenderness. There is no guarding or rebound.      Hernia: No hernia is present.   Musculoskeletal:         General: Normal range of motion.      Cervical back: Normal range of motion and neck supple.   Skin:     General: Skin is warm and dry.      Capillary Refill: Capillary refill takes less than 2 seconds.   Neurological:      General: No focal deficit present.      Mental Status: He is alert and oriented to person, place, and time.      Cranial Nerves: No cranial nerve deficit.   Psychiatric:         Mood and Affect: Mood and affect normal.         Behavior: Behavior normal.         Thought Content: Thought content normal.         Judgment: Judgment normal.        Result Review :   The following data was reviewed by: Kirill Anderson MD on 11/06/2023:  CMP          1/7/2023    13:33 1/13/2023    15:51   CMP   Glucose 100  86    BUN 12  8    Creatinine 0.71  0.98    EGFR 104.4  87.7    Sodium 139  136    Potassium 4.2  4.5    Chloride 102  95    Calcium 9.5  10.4    Total Protein 6.7  7.6    Albumin 3.9  4.7    Globulin 2.8   2.9    Total Bilirubin 0.4  0.4    Alkaline Phosphatase 83  87    AST (SGOT) 11  20    ALT (SGPT) 11  16    Albumin/Globulin Ratio 1.4  1.6    BUN/Creatinine Ratio 16.9  8.2    Anion Gap 10.2  13.0      CBC          1/7/2023    13:33 1/13/2023    15:51   CBC   WBC 7.15  6.03    RBC 3.59  4.29    Hemoglobin 10.0  11.7    Hematocrit 32.5  37.7    MCV 90.5  87.9    MCH 27.9  27.3    MCHC 30.8  31.0    RDW 15.3  13.7    Platelets 447  476      Lipid Panel          11/12/2022    08:52   Lipid Panel   Total Cholesterol 162    Triglycerides 125    HDL Cholesterol 54    VLDL Cholesterol 22    LDL Cholesterol  86    LDL/HDL Ratio 1.54      TSH          1/13/2023    15:51   TSH   TSH 0.543      Data reviewed : Radiologic studies I viewed and interpreted 2 views chest x-rays:NAD.      ECG 12 Lead    Date/Time: 11/6/2023 2:34 PM  Performed by: Kirill Anderson MD    Authorized by: Kirill Anderson MD  Comparison: compared with previous ECG from 11/10/2022  Similar to previous ECG  Rhythm: sinus rhythm  Rate: normal  Conduction: conduction normal  ST Segments: ST segments normal  T Waves: T waves normal  QRS axis: normal  Other: no other findings  Lead: right-sided leads used    Clinical impression: normal ECG            Assessment and Plan    Diagnoses and all orders for this visit:    1. Acute cough (Primary)  -     POCT SARS-CoV-2 + Flu Antigen SONIDO  -     XR Chest PA & Lateral (In Office)  -     CBC Auto Differential  -     Comprehensive Metabolic Panel  -     Magnesium    2. Fever, unspecified fever cause  -     POCT SARS-CoV-2 + Flu Antigen SONIDO    3. Chest pain, unspecified type  -     D-dimer, Quantitative  -     ECG 12 Lead  -     Ambulatory Referral to Cardiology  -     XR Chest PA & Lateral (In Office)  -     High Sensitivity Troponin T  -     CBC Auto Differential  -     Comprehensive Metabolic Panel  -     Magnesium    4. Acute deep vein thrombosis (DVT) of other specified vein of left lower extremity  -      Eliquis 5 MG tablet tablet; Take 1 tablet by mouth Daily.    5. Upper respiratory tract infection, unspecified type  -     cefdinir (OMNICEF) 300 MG capsule; Take 1 capsule by mouth 2 (Two) Times a Day for 7 days.  Dispense: 14 capsule; Refill: 0  -     saccharomyces boulardii (Florastor) 250 MG capsule; Take 1 capsule by mouth 2 (Two) Times a Day for 10 days.  Dispense: 20 capsule; Refill: 0            Follow Up   Return in about 2 weeks (around 11/20/2023) for Recheck.  Patient was given instructions and counseling regarding his condition or for health maintenance advice. Please see specific information pulled into the AVS if appropriate.

## 2023-11-06 NOTE — PROGRESS NOTES
Venipuncture Blood Specimen Collection  Venipuncture performed in left arm by Chava Jones with good hemostasis. Patient tolerated the procedure well without complications.   11/06/23   Chava Jones

## 2023-11-07 DIAGNOSIS — I82.492 ACUTE DEEP VEIN THROMBOSIS (DVT) OF OTHER SPECIFIED VEIN OF LEFT LOWER EXTREMITY: ICD-10-CM

## 2023-11-07 DIAGNOSIS — J06.9 UPPER RESPIRATORY TRACT INFECTION, UNSPECIFIED TYPE: ICD-10-CM

## 2023-11-07 RX ORDER — SACCHAROMYCES BOULARDII 250 MG
250 CAPSULE ORAL 2 TIMES DAILY
Qty: 20 CAPSULE | Refills: 0 | Status: SHIPPED | OUTPATIENT
Start: 2023-11-07 | End: 2023-11-17

## 2023-11-07 RX ORDER — APIXABAN 5 MG/1
5 TABLET, FILM COATED ORAL DAILY
Qty: 90 TABLET | Refills: 0 | Status: SHIPPED | OUTPATIENT
Start: 2023-11-07

## 2023-11-07 RX ORDER — CEFDINIR 300 MG/1
300 CAPSULE ORAL 2 TIMES DAILY
Qty: 14 CAPSULE | Refills: 0 | Status: SHIPPED | OUTPATIENT
Start: 2023-11-07 | End: 2023-11-14

## 2023-11-07 RX ORDER — APIXABAN 5 MG/1
5 TABLET, FILM COATED ORAL DAILY
Start: 2023-11-07 | End: 2023-11-07 | Stop reason: SDUPTHER
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
## 2023-11-07 NOTE — TELEPHONE ENCOUNTER
Patient states that Cindy did not get any of the Rx's that were sent in yesterday.  Patient request that we resend today

## 2023-11-08 ENCOUNTER — OFFICE VISIT (OUTPATIENT)
Dept: UROLOGY | Facility: CLINIC | Age: 63
End: 2023-11-08
Payer: COMMERCIAL

## 2023-11-08 VITALS
BODY MASS INDEX: 23.46 KG/M2 | WEIGHT: 149.47 LBS | DIASTOLIC BLOOD PRESSURE: 76 MMHG | HEIGHT: 67 IN | HEART RATE: 82 BPM | SYSTOLIC BLOOD PRESSURE: 128 MMHG

## 2023-11-08 DIAGNOSIS — N40.1 BENIGN PROSTATIC HYPERPLASIA WITH LOWER URINARY TRACT SYMPTOMS, SYMPTOM DETAILS UNSPECIFIED: Primary | ICD-10-CM

## 2023-11-08 LAB — URINE VOLUME: 62

## 2023-11-08 RX ORDER — FINASTERIDE 5 MG/1
5 TABLET, FILM COATED ORAL DAILY
COMMUNITY

## 2023-11-20 ENCOUNTER — OFFICE VISIT (OUTPATIENT)
Dept: FAMILY MEDICINE CLINIC | Facility: CLINIC | Age: 63
End: 2023-11-20
Payer: COMMERCIAL

## 2023-11-20 VITALS
TEMPERATURE: 98.4 F | HEIGHT: 67 IN | WEIGHT: 143 LBS | DIASTOLIC BLOOD PRESSURE: 74 MMHG | HEART RATE: 114 BPM | OXYGEN SATURATION: 98 % | BODY MASS INDEX: 22.44 KG/M2 | SYSTOLIC BLOOD PRESSURE: 136 MMHG

## 2023-11-20 DIAGNOSIS — R10.13 EPIGASTRIC PAIN: ICD-10-CM

## 2023-11-20 DIAGNOSIS — M51.36 DDD (DEGENERATIVE DISC DISEASE), LUMBAR: ICD-10-CM

## 2023-11-20 DIAGNOSIS — M48.061 SPINAL STENOSIS OF LUMBAR REGION, UNSPECIFIED WHETHER NEUROGENIC CLAUDICATION PRESENT: ICD-10-CM

## 2023-11-20 DIAGNOSIS — K21.9 GASTRO-ESOPHAGEAL REFLUX DISEASE WITHOUT ESOPHAGITIS: ICD-10-CM

## 2023-11-20 DIAGNOSIS — K21.9 GASTRO-ESOPHAGEAL REFLUX DISEASE WITHOUT ESOPHAGITIS: Primary | ICD-10-CM

## 2023-11-20 DIAGNOSIS — R19.8 ABNORMAL BOWEL MOVEMENT: ICD-10-CM

## 2023-11-20 DIAGNOSIS — Z87.11 HISTORY OF PEPTIC ULCER: ICD-10-CM

## 2023-11-20 PROCEDURE — 1159F MED LIST DOCD IN RCRD: CPT | Performed by: NURSE PRACTITIONER

## 2023-11-20 PROCEDURE — 1160F RVW MEDS BY RX/DR IN RCRD: CPT | Performed by: NURSE PRACTITIONER

## 2023-11-20 PROCEDURE — 99214 OFFICE O/P EST MOD 30 MIN: CPT | Performed by: NURSE PRACTITIONER

## 2023-11-20 RX ORDER — OMEPRAZOLE 40 MG/1
40 CAPSULE, DELAYED RELEASE ORAL
COMMUNITY
Start: 2023-11-18 | End: 2023-11-20 | Stop reason: SDUPTHER

## 2023-11-20 RX ORDER — PANTOPRAZOLE SODIUM 40 MG/1
40 TABLET, DELAYED RELEASE ORAL DAILY
COMMUNITY
Start: 2023-11-17 | End: 2023-11-20 | Stop reason: SDUPTHER

## 2023-11-20 RX ORDER — SUCRALFATE ORAL 1 G/10ML
1 SUSPENSION ORAL
COMMUNITY
Start: 2023-11-18 | End: 2023-11-20 | Stop reason: RX

## 2023-11-20 RX ORDER — SUCRALFATE 1 G/1
1 TABLET ORAL 3 TIMES DAILY
Qty: 90 TABLET | Refills: 0 | Status: SHIPPED | OUTPATIENT
Start: 2023-11-20

## 2023-11-20 RX ORDER — SUCRALFATE 1 G/1
1 TABLET ORAL 3 TIMES DAILY
Qty: 270 TABLET | OUTPATIENT
Start: 2023-11-20

## 2023-11-20 RX ORDER — OMEPRAZOLE 40 MG/1
40 CAPSULE, DELAYED RELEASE ORAL DAILY
COMMUNITY

## 2023-11-20 NOTE — PROGRESS NOTES
Chief Complaint  Influenza (Follow up from his Flu.  But he also went to [facility] ER for stomach issues.  )    Subjective            Yoni Aleman presents to [facility]  History of Present Illness  Patient is here today for a 2-week follow-up visit from his last visit with Dr. Anderson-he was diagnosed with upper respiratory infection and was placed on antibiotics     and then also patient reports that he had to also go to [facility] emergency room department regarding his stomach issues and was evaluated there as well--and was told in the [facility] ER dept that he also needed the referral to the gastroenterology and will need the EGD and Cscope as well--    Also pt reports 1 yr ago was Dx'd with stomach ulcers--in [employer]--    Then the pt also--with the current issues and unable to get the carafate liquid at the pharmacy and needs the Rx changed--    Pt is also insistent that he be referred to the neurosurgeon--with regards to the lumbar DDD and neuropathic pain--and does not want pain mgt--(pills) and then also does not want to do the PT--as he's done this in the past and did not help--       PHQ-2 Total Score: 0  PHQ-9 Total Score: 0    Past Medical History:   Diagnosis Date    Deep vein blood clot of left lower extremity     Gastric ulcer     Sciatic nerve pain        No Known Allergies     Past Surgical History:   Procedure Laterality Date    SPLENECTOMY          Social History     Tobacco Use    Smoking status: Former     Packs/day: 2.00     Years: 25.00     Additional pack years: 0.00     Total pack years: 50.00     Types: Cigarettes     Start date:      Quit date:      Years since quittin.9     Passive exposure: Past    Smokeless tobacco: Never   Vaping Use    Vaping Use: Never used   Substance Use Topics    Alcohol use: Not Currently    Drug use: Not Currently       Family History   Problem Relation Age of Onset    Mental illness Mother      Hypertension Mother     Alzheimer's disease Mother         Health Maintenance Due   Topic Date Due    COLORECTAL CANCER SCREENING  Never done    ZOSTER VACCINE (1 of 2) Never done    ANNUAL PHYSICAL  Never done    INFLUENZA VACCINE  08/01/2023    COVID-19 Vaccine (9 - 2023-24 season) 09/01/2023        Current Outpatient Medications on File Prior to Visit   Medication Sig    Eliquis 5 MG tablet tablet Take 1 tablet by mouth Daily.    finasteride (PROSCAR) 5 MG tablet Take 1 tablet by mouth Daily.    tamsulosin (FLOMAX) 0.4 MG capsule 24 hr capsule Take 2 capsules by mouth Daily for 360 days.    gabapentin (NEURONTIN) 300 MG capsule Take 1 capsule by mouth 3 (Three) Times a Day for 30 days.    omeprazole (priLOSEC) 40 MG capsule Take 1 capsule by mouth Daily. Per the ER dept    [DISCONTINUED] omeprazole (priLOSEC) 40 MG capsule Take 1 capsule by mouth.    [DISCONTINUED] pantoprazole (PROTONIX) 40 MG EC tablet Take 1 tablet by mouth Daily.    [DISCONTINUED] sucralfate (Carafate) 1 GM/10ML suspension Take 10 mL by mouth.     No current facility-administered medications on file prior to visit.       Immunization History   Administered Date(s) Administered    COVID-19 (MODERNA) 1st,2nd,3rd Dose Monovalent 06/16/2021, 07/16/2021    COVID-19 (MODERNA) BIVALENT 12+YRS 11/07/2022    COVID-19 (MODERNA) Monovalent Original Booster 01/28/2022, 11/07/2022    COVID-19 (UNSPECIFIED) 06/16/2021, 07/16/2021, 01/28/2022    Fluzone (or Fluarix & Flulaval for VFC) >6mos 11/07/2022    Fluzone Quad >6mos (Multi-dose) 12/06/2018, 10/24/2019, 12/17/2020    Hib (PRP-T) 11/21/2022    Influenza Injectable Mdck Pf Quad 10/17/2017    Influenza Seasonal Injectable 01/28/2022    Influenza, Unspecified 10/17/2017, 01/28/2022    Meningococcal ACYW (MENQUADFI) 11/21/2022    Meningococcal B,(Bexsero) 11/21/2022    Meningococcal Conjugate 01/19/2023    Meningococcal MCV4P (Menactra) 11/21/2022    Pneumococcal Conjugate 13-Valent (PCV13) 11/21/2022     "Pneumococcal Conjugate 20-Valent (PCV20) 01/19/2023    Tdap 02/05/2017    Trumenba(meningococcal B) 01/19/2023       Review of Systems   Constitutional:  Negative for fatigue.   HENT:  Negative for trouble swallowing.    Respiratory:  Negative for shortness of breath.    Cardiovascular:  Negative for chest pain.   Gastrointestinal:  Positive for abdominal pain, constipation, diarrhea and GERD.   Genitourinary:  Negative for dysuria.   Musculoskeletal:  Positive for back pain and gait problem.        A little bit today    Skin:  Negative for wound.   Neurological:  Positive for numbness. Negative for dizziness, syncope and light-headedness.   Psychiatric/Behavioral:  Negative for self-injury and suicidal ideas.         Objective     /74 (BP Location: Right arm, Patient Position: Sitting, Cuff Size: Adult)   Pulse 114   Temp 98.4 °F (36.9 °C) (Temporal)   Ht 170.2 cm (67\")   Wt 64.9 kg (143 lb)   SpO2 98%   BMI 22.40 kg/m²       Physical Exam  Vitals and nursing note reviewed.         Result Review :                      FROM [facility] ER DEPT VISIT:   Impression and Plan  Diagnosis  GERD (gastroesophageal reflux disease) (QGC24-PR K21.9, Discharge, Medical)  Plan  Condition: Improved.  Disposition: Discharged: Time 11/18/2023 03:36:00, to home.  Prescriptions: Launch prescriptions  Pharmacy:  omeprazole 40 mg oral delayed release capsule (Prescribe): 40 mg = 1 cap(s), Oral, Daily, 30 cap(s), 0 Refill(s)  Carafate 1 g/10 mL oral suspension (Prescribe): 1 gm = 10 mL, Oral, QIDACHS, 1,200 mL, 0 Refill(s)  .  Patient was given the following educational materials: Gastroesophageal Reflux Disease, Adult, Easy-to-Read, Gastritis, Adult, Easy-to-Read, Gastritis, Adult, Easy-to-Read, Gastroesophageal Reflux Disease, Adult, Easy-to-Read.  Follow up with: MARTÍN TIRADO In 2 days 11/20/2023 Return to ED if symptoms worsen. Take medications as prescribed. Read handouts provided, avoid spicy, greasy foods, " alcohol or coffee/caffeinated drinks. .  Counseled: Patient, Family, Regarding diagnosis, Regarding diagnostic results, Regarding treatment plan, Regarding prescription, Patient indicated understanding of instructions.  [Electronically Signed on: 11/18/2023 00:08 EST]  ________________________________________  Enedelia Mercedes MD  [Electronically Signed on: 11/18/2023 00:28 EST]  ________________________________________  Enedelia Mercedes MD  [Electronically Signed on: 11/18/2023 03:43 EST]  ________________________________________  Enedelia Mercedes MD  [Verified on: 11/18/2023 00:08 EST]  ________________________________________  Enedelia Mercedes MD    POCT SARS-CoV-2 + Flu Antigen SONIDO (11/06/2023 14:18)   MRI Lumbar Spine Without Contrast (11/10/2022 08:20)   XR Chest PA & Lateral (In Office) (11/06/2023 14:15)   POCT SARS-CoV-2 + Flu Antigen SONIDO (11/06/2023 14:18)  D-dimer, Quantitative (11/06/2023 14:23)  High Sensitivity Troponin T (11/06/2023 14:23)  CBC Auto Differential (11/06/2023 14:23)  Comprehensive Metabolic Panel (11/06/2023 14:23)  Magnesium (11/06/2023 14:23)     Assessment and Plan      Diagnoses and all orders for this visit:    1. Gastro-esophageal reflux disease without esophagitis (Primary)  Comments:  did not have the carafate liquid and we will change to the pill formulation  Orders:  -     sucralfate (Carafate) 1 g tablet; Take 1 tablet by mouth 3 (Three) Times a Day.  Dispense: 90 tablet; Refill: 0  -     Ambulatory Referral to Gastroenterology    2. Epigastric pain  Comments:  did not have the carafate liquid and we will change to the pill formulation  Orders:  -     sucralfate (Carafate) 1 g tablet; Take 1 tablet by mouth 3 (Three) Times a Day.  Dispense: 90 tablet; Refill: 0  -     Ambulatory Referral to Gastroenterology    3. Abnormal bowel movement  Comments:  chronically  Orders:  -     Ambulatory Referral to Gastroenterology    4. DDD (degenerative disc disease), lumbar  -     Ambulatory  "Referral to Neurosurgery    5. Spinal stenosis of lumbar region, unspecified whether neurogenic claudication present  -     Ambulatory Referral to Neurosurgery    6. History of peptic ulcer  -     Ambulatory Referral to Gastroenterology    We will go ahead and change the formulation of the Carafate that was prescribed at the emergency room they had written the elixir/liquid and it was unavailable at the pharmacy level and we will switch it to the pill formulation and that he did get the PPI picked up and we have proceeded with a referral to gastroenterology regarding his history of peptic ulcer as well as gastritis but they diagnosed him with in the emergency room and the persistent symptoms of his intermittent constipation and diarrhea    And then also we will proceed with referral to neurosurgeon as in the past he has already done the x-rays and MRI within the past year and he has already had the prior history of physical therapy and the patient does not want to proceed with anything with pain management as he says that is just for \"pills\" and he actually would like to be evaluated        Follow Up     Return if symptoms worsen or fail to improve.    Patient was given instructions and counseling regarding his condition or for health maintenance advice. Please see specific information pulled into the AVS if appropriate.     BMI is within normal parameters. No other follow-up for BMI required.      Yoni Aleman  reports that he quit smoking about 31 years ago. His smoking use included cigarettes. He started smoking about 52 years ago. He has a 50.00 pack-year smoking history. He has been exposed to tobacco smoke. He has never used smokeless tobacco.. I have educated him on the risk of diseases from using tobacco products such as cancer, COPD, and heart disease.              "

## 2023-11-21 ENCOUNTER — TELEPHONE (OUTPATIENT)
Dept: UROLOGY | Facility: CLINIC | Age: 63
End: 2023-11-21
Payer: COMMERCIAL

## 2023-11-21 DIAGNOSIS — N41.0 ACUTE PROSTATITIS: Primary | ICD-10-CM

## 2023-11-21 RX ORDER — DOXYCYCLINE HYCLATE 100 MG/1
100 CAPSULE ORAL 2 TIMES DAILY
Qty: 56 CAPSULE | Refills: 0 | Status: SHIPPED | OUTPATIENT
Start: 2023-11-21 | End: 2023-12-19

## 2023-11-21 RX ORDER — LACTOBACILLUS ACIDOPHILUS 20B CELL
1 CAPSULE ORAL DAILY
Qty: 28 CAPSULE | Refills: 0 | Status: SHIPPED | OUTPATIENT
Start: 2023-11-21

## 2023-11-21 NOTE — TELEPHONE ENCOUNTER
I have sent in antibiotic and probiotic. If his pain is severe enough that it is not managed by tylenol and/or ibuprofen, he should go to ER for evaluation.

## 2023-11-21 NOTE — TELEPHONE ENCOUNTER
PATIENT SAID HE HAS PENILE PAIN AND LEFT TESTICULAR PAIN.  HE HAS SHOOTING PAINS AT TIMES.  HE SAID IT IS MANAGEABLE DURING THE DAY, BUT IS BAD AT NIGHT.  HE SAID IT IS BAD ENOUGH HE ALMOST WENT TO THE ER A COUPLE NIGHTS.  HE THINKS THAT HE HAS INFECTION.    HE SAID HE NITZA FOR THE SAME ISSUE BEFORE AND SHE TREATED HIM WITH AN ANTIBIOTIC AND POSSIBLY A PAIN MEDICATION.    HE SAID HE ID GOING TO END UP IN THE HOSPITAL.  HE WAS SEEN A COUPLE WEEKS AGO, AND HAS A 6-MONTH APPOINTMENT SCHEDULED, BUT CANNOT WAIT UNTIL THEN.    HE ASKED FOR NITZA TO TREAT WITH AN ANTIBIOTIC AND ASKED FOR PAIN MEDICATION, ASAP.

## 2023-11-21 NOTE — TELEPHONE ENCOUNTER
I CALLED THE PATIENT AND TOLD HIM, PER NITZA:  I have sent in antibiotic and probiotic. If his pain is severe enough that it is not managed by tylenol and/or ibuprofen, he should go to ER for evaluation.     HE VOICED UNDERSTANDING.

## 2023-12-01 ENCOUNTER — TELEPHONE (OUTPATIENT)
Dept: NEUROSURGERY | Facility: CLINIC | Age: 63
End: 2023-12-01
Payer: COMMERCIAL

## 2023-12-11 ENCOUNTER — OFFICE VISIT (OUTPATIENT)
Dept: GASTROENTEROLOGY | Facility: CLINIC | Age: 63
End: 2023-12-11
Payer: COMMERCIAL

## 2023-12-11 ENCOUNTER — PREP FOR SURGERY (OUTPATIENT)
Dept: OTHER | Facility: HOSPITAL | Age: 63
End: 2023-12-11
Payer: COMMERCIAL

## 2023-12-11 VITALS
DIASTOLIC BLOOD PRESSURE: 74 MMHG | SYSTOLIC BLOOD PRESSURE: 123 MMHG | BODY MASS INDEX: 21.82 KG/M2 | HEIGHT: 67 IN | WEIGHT: 139 LBS | HEART RATE: 90 BPM | OXYGEN SATURATION: 99 %

## 2023-12-11 DIAGNOSIS — D64.9 ANEMIA, UNSPECIFIED TYPE: ICD-10-CM

## 2023-12-11 DIAGNOSIS — K25.9 GASTRIC ULCER, UNSPECIFIED CHRONICITY, UNSPECIFIED WHETHER GASTRIC ULCER HEMORRHAGE OR PERFORATION PRESENT: Primary | ICD-10-CM

## 2023-12-11 DIAGNOSIS — K21.9 GASTRO-ESOPHAGEAL REFLUX DISEASE WITHOUT ESOPHAGITIS: ICD-10-CM

## 2023-12-11 DIAGNOSIS — R19.4 ALTERED BOWEL HABITS: ICD-10-CM

## 2023-12-11 DIAGNOSIS — K59.00 CONSTIPATION, UNSPECIFIED CONSTIPATION TYPE: ICD-10-CM

## 2023-12-11 DIAGNOSIS — R19.7 DIARRHEA, UNSPECIFIED TYPE: ICD-10-CM

## 2023-12-11 DIAGNOSIS — R10.13 EPIGASTRIC PAIN: ICD-10-CM

## 2023-12-11 RX ORDER — OMEPRAZOLE 40 MG/1
40 CAPSULE, DELAYED RELEASE ORAL DAILY
Qty: 30 CAPSULE | Refills: 3 | Status: SHIPPED | OUTPATIENT
Start: 2023-12-11

## 2023-12-11 RX ORDER — SUCRALFATE 1 G/1
1 TABLET ORAL 3 TIMES DAILY
Qty: 90 TABLET | Refills: 0 | Status: SHIPPED | OUTPATIENT
Start: 2023-12-11

## 2023-12-11 RX ORDER — SODIUM PICOSULFATE, MAGNESIUM OXIDE, AND ANHYDROUS CITRIC ACID 10; 3.5; 12 MG/160ML; G/160ML; G/160ML
175 LIQUID ORAL ONCE
Qty: 350 ML | Refills: 0 | Status: SHIPPED | OUTPATIENT
Start: 2023-12-11 | End: 2023-12-11

## 2023-12-11 NOTE — PATIENT INSTRUCTIONS
Food Choices for Gastroesophageal Reflux Disease, Adult  When you have gastroesophageal reflux disease (GERD), the foods you eat and your eating habits are very important. Choosing the right foods can help ease your discomfort. Think about working with a food expert (dietitian) to help you make good choices.  What are tips for following this plan?  Reading food labels  Look for foods that are low in saturated fat. Foods that may help with your symptoms include:  Foods that have less than 5% of daily value (DV) of fat.  Foods that have 0 grams of trans fat.  Cooking  Do not taylor your food.  Cook your food by baking, steaming, grilling, or broiling. These are all methods that do not need a lot of fat for cooking.  To add flavor, try to use herbs that are low in spice and acidity.  Meal planning    Choose healthy foods that are low in fat, such as:  Fruits and vegetables.  Whole grains.  Low-fat dairy products.  Lean meats, fish, and poultry.  Eat small meals often instead of eating 3 large meals each day. Eat your meals slowly in a place where you are relaxed. Avoid bending over or lying down until 2-3 hours after eating.  Limit high-fat foods such as fatty meats or fried foods.  Limit your intake of fatty foods, such as oils, butter, and shortening.  Avoid the following as told by your doctor:  Foods that cause symptoms. These may be different for different people. Keep a food diary to keep track of foods that cause symptoms.  Alcohol.  Drinking a lot of liquid with meals.  Eating meals during the 2-3 hours before bed.  Lifestyle  Stay at a healthy weight. Ask your doctor what weight is healthy for you. If you need to lose weight, work with your doctor to do so safely.  Exercise for at least 30 minutes on 5 or more days each week, or as told by your doctor.  Wear loose-fitting clothes.  Do not smoke or use any products that contain nicotine or tobacco. If you need help quitting, ask your doctor.  Sleep with the head  of your bed higher than your feet. Use a wedge under the mattress or blocks under the bed frame to raise the head of the bed.  Chew sugar-free gum after meals.  What foods should eat?    Eat a healthy, well-balanced diet of fruits, vegetables, whole grains, low-fat dairy products, lean meats, fish, and poultry. Each person is different.  Foods that may cause symptoms in one person may not cause any symptoms in another person. Work with your doctor to find foods that are safe for you.  The items listed above may not be a complete list of what you can eat and drink. Contact a food expert for more options.  What foods should I avoid?  Limiting some of these foods may help in managing the symptoms of GERD. Everyone is different. Talk with a food expert or your doctor to help you find the exact foods to avoid, if any.  Fruits  Any fruits prepared with added fat. Any fruits that cause symptoms. For some people, this may include citrus fruits, such as oranges, grapefruit, pineapple, and james.  Vegetables  Deep-fried vegetables. French fries. Any vegetables prepared with added fat. Any vegetables that cause symptoms. For some people, this may include tomatoes and tomato products, chili peppers, onions and garlic, and horseradish.  Grains  Pastries or quick breads with added fat.  Meats and other proteins  High-fat meats, such as fatty beef or pork, hot dogs, ribs, ham, sausage, salami, and colunga. Fried meat or protein, including fried fish and fried chicken. Nuts and nut butters, in large amounts.  Dairy  Whole milk and chocolate milk. Sour cream. Cream. Ice cream. Cream cheese. Milkshakes.  Fats and oils  Butter. Margarine. Shortening. Ghee.  Beverages  Coffee and tea, with or without caffeine. Carbonated beverages. Sodas. Energy drinks. Fruit juice made with acidic fruits, such as orange or grapefruit. Tomato juice. Alcoholic drinks.  Sweets and desserts  Chocolate and cocoa. Donuts.  Seasonings and condiments  Pepper.  Peppermint and spearmint. Added salt. Any condiments, herbs, or seasonings that cause symptoms. For some people, this may include fox, hot sauce, or vinegar-based salad dressings.  The items listed above may not be a complete list of what you should not eat and drink. Contact a food expert for more options.  Questions to ask your doctor  Diet and lifestyle changes are often the first steps that are taken to manage symptoms of GERD. If diet and lifestyle changes do not help, talk with your doctor about taking medicines.  Where to find more information  International Foundation for Gastrointestinal Disorders: aboutgerd.org  Summary  When you have GERD, food and lifestyle choices are very important in easing your symptoms.  Eat small meals often instead of 3 large meals a day. Eat your meals slowly and in a place where you are relaxed.  Avoid bending over or lying down until 2-3 hours after eating.  Limit high-fat foods such as fatty meats or fried foods.  This information is not intended to replace advice given to you by your health care provider. Make sure you discuss any questions you have with your health care provider.  Document Revised: 06/28/2021 Document Reviewed: 06/28/2021  Elsevier Patient Education © 2023 [employer]  Gastroesophageal Reflux Disease, Adult  Gastroesophageal reflux (SHANTHI) happens when acid from the stomach flows up into the tube that connects the mouth and the stomach (esophagus). Normally, food travels down the esophagus and stays in the stomach to be digested. However, when a person has SHANTHI, food and stomach acid sometimes move back up into the esophagus. If this becomes a more serious problem, the person may be diagnosed with a disease called gastroesophageal reflux disease (GERD). GERD occurs when the reflux:  Happens often.  Causes frequent or severe symptoms.  Causes problems such as damage to the esophagus.  When stomach acid comes in contact with the esophagus, the acid may cause  inflammation in the esophagus. Over time, GERD may create small holes (ulcers) in the lining of the esophagus.  What are the causes?  This condition is caused by a problem with the muscle between the esophagus and the stomach (lower esophageal sphincter, or LES). Normally, the LES muscle closes after food passes through the esophagus to the stomach. When the LES is weakened or abnormal, it does not close properly, and that allows food and stomach acid to go back up into the esophagus.  The LES can be weakened by certain dietary substances, medicines, and medical conditions, including:  Tobacco use.  Pregnancy.  Having a hiatal hernia.  Alcohol use.  Certain foods and beverages, such as coffee, chocolate, onions, and peppermint.  What increases the risk?  You are more likely to develop this condition if you:  Have an increased body weight.  Have a connective tissue disorder.  Take NSAIDs, such as ibuprofen.  What are the signs or symptoms?  Symptoms of this condition include:  Heartburn.  Difficult or painful swallowing and the feeling of having a lump in the throat.  A bitter taste in the mouth.  Bad breath and having a large amount of saliva.  Having an upset or bloated stomach and belching.  Chest pain. Different conditions can cause chest pain. Make sure you see your health care provider if you experience chest pain.  Shortness of breath or wheezing.  Ongoing (chronic) cough or a nighttime cough.  Wearing away of tooth enamel.  Weight loss.  How is this diagnosed?  This condition may be diagnosed based on a medical history and a physical exam. To determine if you have mild or severe GERD, your health care provider may also monitor how you respond to treatment. You may also have tests, including:  A test to examine your stomach and esophagus with a small camera (endoscopy).  A test that measures the acidity level in your esophagus.  A test that measures how much pressure is on your esophagus.  A barium swallow or  modified barium swallow test to show the shape, size, and functioning of your esophagus.  How is this treated?  Treatment for this condition may vary depending on how severe your symptoms are. Your health care provider may recommend:  Changes to your diet.  Medicine.  Surgery.  The goal of treatment is to help relieve your symptoms and to prevent complications.  Follow these instructions at home:  Eating and drinking    Follow a diet as recommended by your health care provider. This may involve avoiding foods and drinks such as:  Coffee and tea, with or without caffeine.  Drinks that contain alcohol.  Energy drinks and sports drinks.  Carbonated drinks or sodas.  Chocolate and cocoa.  Peppermint and mint flavorings.  Garlic and onions.  Horseradish.  Spicy and acidic foods, including peppers, chili powder, fox powder, vinegar, hot sauces, and barbecue sauce.  Citrus fruit juices and citrus fruits, such as oranges, james, and limes.  Tomato-based foods, such as red sauce, chili, salsa, and pizza with red sauce.  Fried and fatty foods, such as donuts, french fries, potato chips, and high-fat dressings.  High-fat meats, such as hot dogs and fatty cuts of red and white meats, such as rib eye steak, sausage, ham, and colunga.  High-fat dairy items, such as whole milk, butter, and cream cheese.  Eat small, frequent meals instead of large meals.  Avoid drinking large amounts of liquid with your meals.  Avoid eating meals during the 2-3 hours before bedtime.  Avoid lying down right after you eat.  Do not exercise right after you eat.  Lifestyle    Do not use any products that contain nicotine or tobacco. These products include cigarettes, chewing tobacco, and vaping devices, such as e-cigarettes. If you need help quitting, ask your health care provider.  Try to reduce your stress by using methods such as yoga or meditation. If you need help reducing stress, ask your health care provider.  If you are overweight, reduce your  weight to an amount that is healthy for you. Ask your health care provider for guidance about a safe weight loss goal.  General instructions  Pay attention to any changes in your symptoms.  Take over-the-counter and prescription medicines only as told by your health care provider. Do not take aspirin, ibuprofen, or other NSAIDs unless your health care provider told you to take these medicines.  Wear loose-fitting clothing. Do not wear anything tight around your waist that causes pressure on your abdomen.  Raise (elevate) the head of your bed about 6 inches (15 cm). You can use a wedge to do this.  Avoid bending over if this makes your symptoms worse.  Keep all follow-up visits. This is important.  Contact a health care provider if:  You have:  New symptoms.  Unexplained weight loss.  Difficulty swallowing or it hurts to swallow.  Wheezing or a persistent cough.  A hoarse voice.  Your symptoms do not improve with treatment.  Get help right away if:  You have sudden pain in your arms, neck, jaw, teeth, or back.  You suddenly feel sweaty, dizzy, or light-headed.  You have chest pain or shortness of breath.  You vomit and the vomit is green, yellow, or black, or it looks like blood or coffee grounds.  You faint.  You have stool that is red, bloody, or black.  You cannot swallow, drink, or eat.  These symptoms may represent a serious problem that is an emergency. Do not wait to see if the symptoms will go away. Get medical help right away. Call your local emergency services (911 in the [place] Do not drive yourself to the hospital.  Summary  Gastroesophageal reflux happens when acid from the stomach flows up into the esophagus. GERD is a disease in which the reflux happens often, causes frequent or severe symptoms, or causes problems such as damage to the esophagus.  Treatment for this condition may vary depending on how severe your symptoms are. Your health care provider may recommend diet and lifestyle changes,  medicine, or surgery.  Contact a health care provider if you have new or worsening symptoms.  Take over-the-counter and prescription medicines only as told by your health care provider. Do not take aspirin, ibuprofen, or other NSAIDs unless your health care provider told you to do so.  Keep all follow-up visits as told by your health care provider. This is important.  This information is not intended to replace advice given to you by your health care provider. Make sure you discuss any questions you have with your health care provider.  Document Revised: 06/28/2021 Document Reviewed: 06/28/2021  Elsevier Patient Education © 2023 [employer] Inc[employer]

## 2023-12-11 NOTE — PROGRESS NOTES
Chief Complaint        Abdominal Pain (Stomach issues leading to feeling nausea/ has a lot of gas and pain/ cut out hamburger sugar soda and feels somewhat better) and Constipation (Some times black stool brown long some or nuggets when it hits has to go/)    History of Present Illness      Yoni Aleman is a 63 y.o. male who presents to [facility] as a new patient with a history of stomach body ulcer, Mesa's esophagus and altered bowel movements.    EGD: Review of the patient's most recent EGD performed by Dr. Jonny Mcarthur on 11.29.2022 mucosa suggestive of Mesa's esophagus, blood in the stomach, normal mucosa in the duodenum, ulcer in the stomach body.  It was recommended patient have repeat EGD in 3 to 6 months to assess healing.    Most recent labs on 11.06.2023  Results       Result Review :   The following data was reviewed by: NATALIE Machado on 12/11/2023     CMP          1/7/2023    13:33 1/13/2023    15:51 11/6/2023    14:23   CMP   Glucose 100  86  99    BUN 12  8  15    Creatinine 0.71  0.98  0.89    EGFR 104.4  87.7  96.9    Sodium 139  136  137    Potassium 4.2  4.5  4.2    Chloride 102  95  103    Calcium 9.5  10.4  9.8    Total Protein 6.7  7.6  7.0    Albumin 3.9  4.7  4.9    Globulin 2.8  2.9  2.1    Total Bilirubin 0.4  0.4  0.8    Alkaline Phosphatase 83  87  64    AST (SGOT) 11  20  16    ALT (SGPT) 11  16  11    Albumin/Globulin Ratio 1.4  1.6  2.3    BUN/Creatinine Ratio 16.9  8.2  16.9    Anion Gap 10.2  13.0  10.5      CBC          1/7/2023    13:33 1/13/2023    15:51 11/6/2023    14:23   CBC   WBC 7.15  6.03  10.37    RBC 3.59  4.29  4.31    Hemoglobin 10.0  11.7  12.9    Hematocrit 32.5  37.7  37.6    MCV 90.5  87.9  87.2    MCH 27.9  27.3  29.9    MCHC 30.8  31.0  34.3    RDW 15.3  13.7  12.4    Platelets 447  476  318        Iron Profile   Iron   Date Value Ref Range Status   09/25/2019 108 70 - 180 ug/dL Final     TIBC   Date Value Ref Range  Status   09/25/2019 356 245 - 450 ug/dL Final     Comment:     As of 10/15/03, the chemistry department began utilizing a Transferrin  assay. Data suggests that Transferrin is a better estimate of Total Iron  binding capacity than the TIBC assay. As a result the TIBC will now be a  calculated estimate from the measured Transferrin.       Iron Saturation   Date Value Ref Range Status   09/25/2019 30 20 - 55 % Final     Transferrin   Date Value Ref Range Status   09/25/2019 249.00 215.00 - 365.00 mg/dL Final     Ferritin   Ferritin   Date Value Ref Range Status   09/25/2019 249 30 - 300 ng/mL Final     Comment:     <10 ng/ml usually associated with Iron Deficiency Anemia.  Above normal range levels may be due to Hepatic and/or  Chronic Inflammatory Disease.              Past Medical History       Past Medical History:   Diagnosis Date    Deep vein blood clot of left lower extremity     Gastric ulcer     Sciatic nerve pain        Past Surgical History:   Procedure Laterality Date    SPLENECTOMY      UPPER GASTROINTESTINAL ENDOSCOPY           Current Outpatient Medications:     doxycycline (VIBRAMYCIN) 100 MG capsule, Take 1 capsule by mouth 2 (Two) Times a Day for 28 days., Disp: 56 capsule, Rfl: 0    Eliquis 5 MG tablet tablet, Take 1 tablet by mouth Daily., Disp: 90 tablet, Rfl: 0    finasteride (PROSCAR) 5 MG tablet, Take 1 tablet by mouth Daily., Disp: , Rfl:     Lactobacillus (Florajen Acidophilus) capsule, Take 1 capsule by mouth Daily., Disp: 28 capsule, Rfl: 0    omeprazole (priLOSEC) 40 MG capsule, Take 1 capsule by mouth Daily. Per the ER dept, Disp: 30 capsule, Rfl: 3    sucralfate (Carafate) 1 g tablet, Take 1 tablet by mouth 3 (Three) Times a Day., Disp: 90 tablet, Rfl: 0    tamsulosin (FLOMAX) 0.4 MG capsule 24 hr capsule, Take 2 capsules by mouth Daily for 360 days., Disp: 180 capsule, Rfl: 3    gabapentin (NEURONTIN) 300 MG capsule, Take 1 capsule by mouth 3 (Three) Times a Day for 30 days., Disp: 90  "capsule, Rfl: 0    Sod Picosulfate-Mag Ox-Cit Acd (Clenpiq) 10-3.5-12 MG-GM -GM/160ML solution, Take 175 mL by mouth 1 (One) Time for 1 dose. As directed by office., Disp: 350 mL, Rfl: 0     No Known Allergies    Family History   Problem Relation Age of Onset    Mental illness Mother     Hypertension Mother     Alzheimer's disease Mother     Colon cancer Neg Hx         Social History     Social History Narrative    Not on file       Objective       Objective     Vital Signs:   /74 (BP Location: Left arm, Patient Position: Sitting, Cuff Size: Adult)   Pulse 90   Ht 170.2 cm (67\")   Wt 63 kg (139 lb)   SpO2 99%   BMI 21.77 kg/m²     Body mass index is 21.77 kg/m².    Physical Exam  Constitutional:       General: He is not in acute distress.     Appearance: Normal appearance. He is well-developed and normal weight.   Eyes:      Conjunctiva/sclera: Conjunctivae normal.      Pupils: Pupils are equal, round, and reactive to light.      Visual Fields: Right eye visual fields normal and left eye visual fields normal.   Cardiovascular:      Rate and Rhythm: Normal rate and regular rhythm.      Heart sounds: Normal heart sounds.   Pulmonary:      Effort: Pulmonary effort is normal. No retractions.      Breath sounds: Normal breath sounds and air entry.      Comments: Inspection of chest: normal appearance  Abdominal:      General: Bowel sounds are normal.      Palpations: Abdomen is soft.      Tenderness: There is no abdominal tenderness.      Comments: No appreciable hepatosplenomegaly   Musculoskeletal:      Cervical back: Neck supple.      Right lower leg: No edema.      Left lower leg: No edema.   Lymphadenopathy:      Cervical: No cervical adenopathy.   Skin:     Findings: No lesion.      Comments: Turgor normal   Neurological:      Mental Status: He is alert and oriented to person, place, and time.   Psychiatric:         Mood and Affect: Mood and affect normal.              Assessment & Plan        "   Assessment and Plan    Diagnoses and all orders for this visit:    1. Gastric ulcer, unspecified chronicity, unspecified whether gastric ulcer hemorrhage or perforation present (Primary)    2. Gastro-esophageal reflux disease without esophagitis  -     sucralfate (Carafate) 1 g tablet; Take 1 tablet by mouth 3 (Three) Times a Day.  Dispense: 90 tablet; Refill: 0    3. Anemia, unspecified type    4. Altered bowel habits    5. Diarrhea, unspecified type    6. Constipation, unspecified constipation type    7. Gastro-esophageal reflux disease without esophagitis  Comments:  did not have the carafate liquid and we will change to the pill formulation  Orders:  -     sucralfate (Carafate) 1 g tablet; Take 1 tablet by mouth 3 (Three) Times a Day.  Dispense: 90 tablet; Refill: 0    8. Epigastric pain  -     sucralfate (Carafate) 1 g tablet; Take 1 tablet by mouth 3 (Three) Times a Day.  Dispense: 90 tablet; Refill: 0    Other orders  -     omeprazole (priLOSEC) 40 MG capsule; Take 1 capsule by mouth Daily. Per the ER dept  Dispense: 30 capsule; Refill: 3  -     Sod Picosulfate-Mag Ox-Cit Acd (Clenpiq) 10-3.5-12 MG-GM -GM/160ML solution; Take 175 mL by mouth 1 (One) Time for 1 dose. As directed by office.  Dispense: 350 mL; Refill: 0      63-year-old male presenting the office today with a history of gastric ulcer, reflux, anemia, altered bowel habits, diarrhea, constipation and epigastric pain with bloating.  I have recommended that the patient undergo further evaluation with an EGD and colonoscopy.  I have discussed this procedure in detail with the patient.  I have discussed the risks, benefits and alternatives.  I have discussed the risk of anesthesia, bleeding and perforation.  Patient understands these risks, benefits and alternatives and wishes to proceed.  I will schedule him at his earliest convenience.  Patient will continue on Carafate which I have refilled at this time for history of ulcers and Prilosec.   Patient will follow-up in office after endoscopy.  Patient agreeable to this plan will call with any questions or concerns.          Follow Up       Follow Up   Return for Follow up after endoscopy in office.  Patient was given instructions and counseling regarding his condition or for health maintenance advice. Please see specific information pulled into the AVS if appropriate.

## 2023-12-12 ENCOUNTER — TELEPHONE (OUTPATIENT)
Dept: FAMILY MEDICINE CLINIC | Facility: CLINIC | Age: 63
End: 2023-12-12
Payer: COMMERCIAL

## 2023-12-12 NOTE — TELEPHONE ENCOUNTER
Attempted to call patient regarding request to hold meds from Gastro office for upcoming procedure. No Answer, left message for him to schedule a follow up visit in office to discuss w/ PCP.  (Hub to read)

## 2023-12-15 ENCOUNTER — CLINICAL SUPPORT (OUTPATIENT)
Dept: FAMILY MEDICINE CLINIC | Facility: CLINIC | Age: 63
End: 2023-12-15
Payer: COMMERCIAL

## 2023-12-15 DIAGNOSIS — Z23 INFLUENZA VACCINE NEEDED: Primary | ICD-10-CM

## 2023-12-15 PROCEDURE — 90686 IIV4 VACC NO PRSV 0.5 ML IM: CPT | Performed by: FAMILY MEDICINE

## 2023-12-15 PROCEDURE — 90471 IMMUNIZATION ADMIN: CPT | Performed by: FAMILY MEDICINE

## 2023-12-20 ENCOUNTER — PATIENT ROUNDING (BHMG ONLY) (OUTPATIENT)
Dept: GASTROENTEROLOGY | Facility: CLINIC | Age: 63
End: 2023-12-20
Payer: COMMERCIAL

## 2023-12-20 NOTE — PROGRESS NOTES
12/20/2023      Hello, may I speak with Yoni Aleman     My name is Alina. I am calling from [facility] [place]. I show that you had a recent visit with NATALIE Machado.    Before we get started may I verify your date of birth? 1960    I am calling to officially welcome you to our practice and ask about your recent visit. Is this a good time to talk? Voicemail left ok per CRESENCIO    Tell me about your visit with us. What things went well?    We strive to ensure that we protect your safety and privacy. Is there anything we could have done to improve this during your visit?        We're always looking for ways to make our patients' experiences even better. Do you have recommendations on ways we may improve?    Overall were you satisfied with your first visit to our practice?    I appreciate you taking the time to speak with me today. Is there anything else I can do for you?    I am glad to hear that you had a very good visit and I appreciate you taking the time to provide feedback on this call. We would greatly appreciate you filling out a survey if you receive one in the mail, email or text. This is a great opportunity to provide any additional feedback that you may think of after this call as well.       Thank you, and have a great day.

## 2023-12-21 ENCOUNTER — OFFICE VISIT (OUTPATIENT)
Dept: NEUROSURGERY | Facility: CLINIC | Age: 63
End: 2023-12-21
Payer: COMMERCIAL

## 2023-12-21 VITALS
BODY MASS INDEX: 21.97 KG/M2 | HEART RATE: 89 BPM | HEIGHT: 67 IN | DIASTOLIC BLOOD PRESSURE: 83 MMHG | WEIGHT: 140 LBS | SYSTOLIC BLOOD PRESSURE: 120 MMHG

## 2023-12-21 DIAGNOSIS — M54.41 CHRONIC MIDLINE LOW BACK PAIN WITH BILATERAL SCIATICA: ICD-10-CM

## 2023-12-21 DIAGNOSIS — M47.816 LUMBAR FACET ARTHROPATHY: ICD-10-CM

## 2023-12-21 DIAGNOSIS — M48.061 FORAMINAL STENOSIS OF LUMBAR REGION: ICD-10-CM

## 2023-12-21 DIAGNOSIS — M54.42 CHRONIC MIDLINE LOW BACK PAIN WITH BILATERAL SCIATICA: ICD-10-CM

## 2023-12-21 DIAGNOSIS — M51.36 DDD (DEGENERATIVE DISC DISEASE), LUMBAR: Primary | ICD-10-CM

## 2023-12-21 DIAGNOSIS — G89.29 CHRONIC MIDLINE LOW BACK PAIN WITH BILATERAL SCIATICA: ICD-10-CM

## 2023-12-21 NOTE — PROGRESS NOTES
"Chief Complaint  Back Pain and Leg Pain (Bilateral legs(left))    Subjective          Yoni Aleman who is a 63 y.o. year old male who presents to [facility] for Evaluation of the Spine.     The patient complains of pain located in the Lumbar Spine.  Patients states the pain has been present since 1997.  The pain came on gradually.  The pain scaled level is 4.  The pain does radiate. Dermatomes are located bilaterally Lumbar at: below the knee and a non-specific dermatome.  The pain is constant and waxing/waning and described as  \"freezing pain\" .  The pain is worse at no particular time of day. Patient states Rest makes the pain better.  Patient states Changes in Weather and picking up a lot of stuff, working hard makes the pain worse.    Associated Symptoms Include: Numbness and Tingling in the legs. Denies weakness or loss of bowel or bladder control.  Conservative Interventions Include: Physical Therapy that was ineffective. Pain medication was ineffective. Gabapentin was ineffective. Muscle relaxer was ineffective.    Was this the result of an injury or accident? : No    History of Previous Spinal Surgery?: No     reports that he quit smoking about 31 years ago. His smoking use included cigarettes. He started smoking about 53 years ago. He has a 50.00 pack-year smoking history. He has been exposed to tobacco smoke. He has never used smokeless tobacco.    Review of Systems   Musculoskeletal:  Positive for back pain.        Objective   Vital Signs:   /83 (BP Location: Left arm, Patient Position: Sitting, Cuff Size: Adult)   Pulse 89   Ht 170.2 cm (67\")   Wt 63.5 kg (140 lb)   BMI 21.93 kg/m²       Physical Exam  Constitutional:       Appearance: Normal appearance. He is normal weight.   Pulmonary:      Effort: Pulmonary effort is normal.   Musculoskeletal:         General: Tenderness (lumbar area) present.      Comments: SLR negative bilaterally   Neurological: " "     General: No focal deficit present.      Mental Status: He is alert and oriented to person, place, and time.      Sensory: No sensory deficit.      Motor: No weakness.      Deep Tendon Reflexes: Reflexes normal.   Psychiatric:         Mood and Affect: Mood normal.         Behavior: Behavior normal.        Neurologic Exam     Mental Status   Oriented to person, place, and time.        Result Review     I have reviewed the radiology report for MRI of the lumbar spine without contrast from 11/10/2022 which shows mild multilevel spondylosis and facet arthritis.  There is mild left foraminal stenosis at L5-S1.  There is no significant central canal stenosis.     Assessment and Plan    Diagnoses and all orders for this visit:    1. DDD (degenerative disc disease), lumbar (Primary)  -     MRI Lumbar Spine Without Contrast; Future    2. Lumbar facet arthropathy  -     MRI Lumbar Spine Without Contrast; Future    3. Foraminal stenosis of lumbar region  -     MRI Lumbar Spine Without Contrast; Future    4. Chronic midline low back pain with bilateral sciatica  -     MRI Lumbar Spine Without Contrast; Future    He has \"freezing pain\" in both legs to the toes non-specifically.    He has an older MRI which shows some stenosis on the left at L5-S1, but otherwise no significant stenosis. He has multilevel spondylosis and facet arthritis.    I am going to order a new MRI of the lumbar spine without contrast to evaluate for new or worsening lumbar spine pathology.    We discussed PT as a conservative treatment option, but he is deferring as this never really helped him in the past.    He will follow-up here in 4 weeks to reassess and review the MRI, sooner if needed.    Follow Up   Return in about 4 weeks (around 1/18/2024).  Patient was given instructions and counseling regarding his condition or for health maintenance advice. Please see specific information pulled into the AVS if appropriate.        "

## 2024-01-02 ENCOUNTER — OFFICE VISIT (OUTPATIENT)
Dept: FAMILY MEDICINE CLINIC | Facility: CLINIC | Age: 64
End: 2024-01-02
Payer: COMMERCIAL

## 2024-01-02 VITALS
HEIGHT: 67 IN | HEART RATE: 104 BPM | SYSTOLIC BLOOD PRESSURE: 126 MMHG | DIASTOLIC BLOOD PRESSURE: 72 MMHG | BODY MASS INDEX: 21.97 KG/M2 | WEIGHT: 140 LBS | OXYGEN SATURATION: 98 % | TEMPERATURE: 98 F

## 2024-01-02 DIAGNOSIS — I82.492 DEEP VEIN THROMBOSIS (DVT) OF OTHER VEIN OF LEFT LOWER EXTREMITY, UNSPECIFIED CHRONICITY: Primary | ICD-10-CM

## 2024-01-02 DIAGNOSIS — Z86.711 HISTORY OF PULMONARY EMBOLISM: ICD-10-CM

## 2024-01-02 DIAGNOSIS — Z71.85 IMMUNIZATION COUNSELING: ICD-10-CM

## 2024-01-02 DIAGNOSIS — G89.29 CHRONIC MIDLINE LOW BACK PAIN WITH BILATERAL SCIATICA: ICD-10-CM

## 2024-01-02 DIAGNOSIS — M54.42 CHRONIC MIDLINE LOW BACK PAIN WITH BILATERAL SCIATICA: ICD-10-CM

## 2024-01-02 DIAGNOSIS — M54.41 CHRONIC MIDLINE LOW BACK PAIN WITH BILATERAL SCIATICA: ICD-10-CM

## 2024-01-02 DIAGNOSIS — Z90.81 HISTORY OF SPLENECTOMY: ICD-10-CM

## 2024-01-02 DIAGNOSIS — K21.9 GASTRO-ESOPHAGEAL REFLUX DISEASE WITHOUT ESOPHAGITIS: ICD-10-CM

## 2024-01-02 DIAGNOSIS — R10.13 EPIGASTRIC PAIN: ICD-10-CM

## 2024-01-02 PROBLEM — Z86.718 HX OF BLOOD CLOTS: Status: ACTIVE | Noted: 2024-01-02

## 2024-01-02 PROBLEM — R33.9 URINARY RETENTION: Status: ACTIVE | Noted: 2024-01-02

## 2024-01-02 PROBLEM — I82.402 DEEP VEIN THROMBOSIS (DVT) OF LEFT LOWER EXTREMITY: Status: ACTIVE | Noted: 2024-01-02

## 2024-01-02 RX ORDER — TIZANIDINE 4 MG/1
4 TABLET ORAL NIGHTLY PRN
Qty: 30 TABLET | Refills: 5 | Status: SHIPPED | OUTPATIENT
Start: 2024-01-02

## 2024-01-02 RX ORDER — SUCRALFATE 1 G/1
1 TABLET ORAL 3 TIMES DAILY
Qty: 90 TABLET | Refills: 0 | Status: SHIPPED | OUTPATIENT
Start: 2024-01-02
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
## 2024-01-04 ENCOUNTER — TELEPHONE (OUTPATIENT)
Dept: GASTROENTEROLOGY | Facility: CLINIC | Age: 64
End: 2024-01-04
Payer: COMMERCIAL

## 2024-01-12 NOTE — PRE-PROCEDURE INSTRUCTIONS
"Instructed on date and arrival time of 1200. Come to entrance \"C\". Must have  over age 18 to drive home.  May have two visitors; however, children under 12 must stay in waiting room.  Discussed clear liquid diet (no red or purple), bowel prep, and NPO.  May take medications as usual except for blood thinners, diabetic medications, and weight loss medications.  Bring list of medications.  Verbalized understanding of instructions given.  Instructed to call for questions or concerns.  Hold Eliquis for 2 days prior to procedure.  Clearance noted in chart.  "

## 2024-01-16 ENCOUNTER — TELEPHONE (OUTPATIENT)
Dept: GASTROENTEROLOGY | Facility: CLINIC | Age: 64
End: 2024-01-16
Payer: COMMERCIAL

## 2024-01-16 NOTE — TELEPHONE ENCOUNTER
Yoni Aleman  1960    Patient requested to Reschedule their EGD & Colonoscopy. I have offered to reschedule this patient and patient has AGREED. Patient has been rescheduled to 01.31.24.    Reason for cancelling/rescheduling: WEATHER    This procedure was ordered by NATALIE Machado for an important reason. We want to inform you that there are risks associated with not proceeding with the procedure at this time such as a delay in diagnosis, risk of incurable disease, or cancer.    Patient plans to call us back to reschedule: N/A    Updated clearance needed?: YES    If yes, clearance request has been submitted to: Provider Name    Is the patient currently on any injectable medications for weight loss or diabetes? NO    Patient verbalized understanding for all of the above information.

## 2024-01-18 ENCOUNTER — TELEPHONE (OUTPATIENT)
Dept: NEUROSURGERY | Facility: CLINIC | Age: 64
End: 2024-01-18
Payer: COMMERCIAL

## 2024-01-18 NOTE — TELEPHONE ENCOUNTER
Yoni will need to completed physical therapy per insurance. Sen can refer to physical therapy is there is a certain place the patient wishes to go.

## 2024-01-18 NOTE — TELEPHONE ENCOUNTER
Caller: Yoni Aleman    Relationship: Self    Best call back number: [phone]    Who are you requesting to speak with (clinical staff, provider,  specific staff member): CLINICAL STAFF    What was the call regarding: PATIENT STATES MRI HAS BEEN DENIED, AS THEY ARE REQUIRING PT. PLEASE CALL PATIENT TO ADVISE ON NEXT STEPS; HE IS CURRENTLY SCHEDULED FOR FOLLOW UP ON 1/24/24.
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
## 2024-01-19 NOTE — TELEPHONE ENCOUNTER
Yoni requested a call back on Monday, 1/22/2024  He stated that he would give the name of the place he would like to be referred to then.

## 2024-01-22 DIAGNOSIS — I82.492 ACUTE DEEP VEIN THROMBOSIS (DVT) OF OTHER SPECIFIED VEIN OF LEFT LOWER EXTREMITY: ICD-10-CM

## 2024-01-22 RX ORDER — APIXABAN 5 MG/1
5 TABLET, FILM COATED ORAL DAILY
Qty: 60 TABLET | Refills: 0 | Status: SHIPPED | OUTPATIENT
Start: 2024-01-22

## 2024-01-22 NOTE — TELEPHONE ENCOUNTER
PATIENT CALLED - WANTS TO GO TO [facility] IN [place] - PHONE: [phone].    SHOULD PT KEEP HIS UPCOMING F/U OR WAIT TIL AFTER PT. PLEASE ADVISE THE PATIENT.

## 2024-01-22 NOTE — TELEPHONE ENCOUNTER
Advised Yoni that his appointment for 1/24/2024 can be canceled and he can reschedule after his PT has been completed.    Yoni would like a PT referral to to go to  '[facility] IN [place] - PHONE: [phone].

## 2024-01-22 NOTE — TELEPHONE ENCOUNTER
Returning call to find out the name and location where at Yoni wishes to complete his physical therapy.    Ok. For HUB to take message. Thanks

## 2024-01-23 NOTE — PRE-PROCEDURE INSTRUCTIONS
"      Instructed on date and arrival time of 1300. Come to entrance \"C\". Must have  over age 18 to drive home.  May have two visitors; however, children under 12 must stay in waiting room.  Discussed clear liquid diet (no red or purple), bowel prep, and NPO.  May take medications as usual except for blood thinners, diabetic medications, and weight loss medications.  Bring list of medications.  Verbalized understanding of instructions given.  Instructed to call for questions or concerns.  Hold Eliquis for 2 days prior to procedure.  Clearance noted in chart   "

## 2024-01-30 ENCOUNTER — ANESTHESIA EVENT (OUTPATIENT)
Dept: GASTROENTEROLOGY | Facility: HOSPITAL | Age: 64
End: 2024-01-30
Payer: COMMERCIAL

## 2024-01-30 NOTE — ANESTHESIA PREPROCEDURE EVALUATION
Anesthesia Evaluation     Patient summary reviewed and Nursing notes reviewed   NPO Solid Status: > 8 hours  NPO Liquid Status: > 4 hours           Airway   Mallampati: II  TM distance: >3 FB  Neck ROM: full  No difficulty expected  Dental    (+) poor dentition        Pulmonary     breath sounds clear to auscultation  Cardiovascular   Exercise tolerance: good (4-7 METS)    ECG reviewed  PT is on anticoagulation therapy  Rhythm: regular  Rate: normal    (+) valvular problems/murmurs (mild MR) MR, murmur, DVT      Neuro/Psych  (+) numbness  GI/Hepatic/Renal/Endo    (+) GERD well controlled, PUD    Musculoskeletal     Abdominal    Substance History      OB/GYN          Other        ROS/Med Hx Other: Last Eliquis - 1/29    EKG 11/06/23: HR 78, SR    ECHO 12/14/22: Calculated left ventricular ejection fraction of 57 %, mild mitral regurgitation              Anesthesia Plan    ASA 3     general   total IV anesthesia  (Total IV Anesthesia    Patient understands anesthesia not responsible for dental damage.  )  intravenous induction     Anesthetic plan, risks, benefits, and alternatives have been provided, discussed and informed consent has been obtained with: patient and spouse/significant other.  Pre-procedure education provided  Plan discussed with CRNA.    CODE STATUS:

## 2024-01-31 ENCOUNTER — ANESTHESIA (OUTPATIENT)
Dept: GASTROENTEROLOGY | Facility: HOSPITAL | Age: 64
End: 2024-01-31
Payer: COMMERCIAL

## 2024-01-31 ENCOUNTER — HOSPITAL ENCOUNTER (OUTPATIENT)
Facility: HOSPITAL | Age: 64
Setting detail: HOSPITAL OUTPATIENT SURGERY
Discharge: HOME | End: 2024-01-31
Attending: INTERNAL MEDICINE | Admitting: INTERNAL MEDICINE
Payer: COMMERCIAL

## 2024-01-31 VITALS
SYSTOLIC BLOOD PRESSURE: 116 MMHG | RESPIRATION RATE: 15 BRPM | OXYGEN SATURATION: 100 % | WEIGHT: 148.15 LBS | BODY MASS INDEX: 23.55 KG/M2 | HEART RATE: 81 BPM | TEMPERATURE: 98.6 F | DIASTOLIC BLOOD PRESSURE: 72 MMHG

## 2024-01-31 DIAGNOSIS — K25.9 GASTRIC ULCER, UNSPECIFIED CHRONICITY, UNSPECIFIED WHETHER GASTRIC ULCER HEMORRHAGE OR PERFORATION PRESENT: ICD-10-CM

## 2024-01-31 DIAGNOSIS — R19.4 ALTERED BOWEL HABITS: ICD-10-CM

## 2024-01-31 PROCEDURE — 45385 COLONOSCOPY W/LESION REMOVAL: CPT | Performed by: INTERNAL MEDICINE

## 2024-01-31 PROCEDURE — 88305 TISSUE EXAM BY PATHOLOGIST: CPT | Performed by: INTERNAL MEDICINE

## 2024-01-31 PROCEDURE — 25010000002 PROPOFOL 10 MG/ML EMULSION

## 2024-01-31 PROCEDURE — 43239 EGD BIOPSY SINGLE/MULTIPLE: CPT | Performed by: INTERNAL MEDICINE

## 2024-01-31 PROCEDURE — 25810000003 LACTATED RINGERS PER 1000 ML: Performed by: ANESTHESIOLOGY

## 2024-01-31 RX ORDER — LIDOCAINE HYDROCHLORIDE 20 MG/ML
INJECTION, SOLUTION EPIDURAL; INFILTRATION; INTRACAUDAL; PERINEURAL AS NEEDED
Status: DISCONTINUED | OUTPATIENT
Start: 2024-01-31 | End: 2024-01-31 | Stop reason: SURG

## 2024-01-31 RX ORDER — PROPOFOL 10 MG/ML
VIAL (ML) INTRAVENOUS AS NEEDED
Status: DISCONTINUED | OUTPATIENT
Start: 2024-01-31 | End: 2024-01-31 | Stop reason: SURG

## 2024-01-31 RX ORDER — SODIUM CHLORIDE, SODIUM LACTATE, POTASSIUM CHLORIDE, CALCIUM CHLORIDE 600; 310; 30; 20 MG/100ML; MG/100ML; MG/100ML; MG/100ML
30 INJECTION, SOLUTION INTRAVENOUS CONTINUOUS
Status: DISCONTINUED | OUTPATIENT
Start: 2024-01-31 | End: 2024-01-31 | Stop reason: HOSPADM

## 2024-01-31 RX ADMIN — LIDOCAINE HYDROCHLORIDE 40 MG: 20 INJECTION, SOLUTION EPIDURAL; INFILTRATION; INTRACAUDAL; PERINEURAL at 15:11

## 2024-01-31 RX ADMIN — SODIUM CHLORIDE, POTASSIUM CHLORIDE, SODIUM LACTATE AND CALCIUM CHLORIDE 30 ML/HR: 600; 310; 30; 20 INJECTION, SOLUTION INTRAVENOUS at 14:03

## 2024-01-31 RX ADMIN — PROPOFOL 150 MCG/KG/MIN: 10 INJECTION, EMULSION INTRAVENOUS at 15:11

## 2024-01-31 RX ADMIN — PROPOFOL 100 MG: 10 INJECTION, EMULSION INTRAVENOUS at 15:11

## 2024-01-31 RX ADMIN — LIDOCAINE HYDROCHLORIDE 60 MG: 20 INJECTION, SOLUTION EPIDURAL; INFILTRATION; INTRACAUDAL; PERINEURAL at 15:33

## 2024-01-31 NOTE — ANESTHESIA POSTPROCEDURE EVALUATION
Patient: Yoni Aleman    Procedure Summary       Date: 01/31/24 Room / Location: [facility] ENDOSCOPY 2 / [facility]    Anesthesia Start: 1508 Anesthesia Stop: 1545    Procedures:       ESOPHAGOGASTRODUODENOSCOPY WITH BIOPSIES      COLONOSCOPY WITH HOT SNARE POLYPECTOMY Diagnosis:       Gastric ulcer, unspecified chronicity, unspecified whether gastric ulcer hemorrhage or perforation present      Altered bowel habits      (Gastric ulcer, unspecified chronicity, unspecified whether gastric ulcer hemorrhage or perforation present [K25.9])      (Altered bowel habits [R19.4])    Surgeons: Juno Anderson MD Provider: Malren Iraheta CRNA    Anesthesia Type: general ASA Status: 3            Anesthesia Type: general    Vitals  Vitals Value Taken Time   /60 01/31/24 1550   Temp 37.1 °C (98.7 °F) 01/31/24 1545   Pulse 87 01/31/24 1554   Resp 15 01/31/24 1550   SpO2 99 % 01/31/24 1554   Vitals shown include unfiled device data.        Post Anesthesia Care and Evaluation    Patient location during evaluation: bedside  Patient participation: complete - patient participated  Level of consciousness: awake  Pain score: 0  Pain management: adequate    Airway patency: patent  Anesthetic complications: No anesthetic complications  PONV Status: none  Cardiovascular status: acceptable and stable  Respiratory status: acceptable, spontaneous ventilation and room air  Hydration status: acceptable

## 2024-01-31 NOTE — H&P
Pre Procedure History & Physical    Chief Complaint:   Mesa's  Gerd  PUD  Anemia  constipation    Subjective     HPI:   As above    Past Medical History:   Past Medical History:   Diagnosis Date    Deep vein blood clot of left lower extremity     Gastric ulcer     Sciatic nerve pain        Past Surgical History:  Past Surgical History:   Procedure Laterality Date    SPLENECTOMY      UPPER GASTROINTESTINAL ENDOSCOPY         Family History:  Family History   Problem Relation Age of Onset    Mental illness Mother     Hypertension Mother     Alzheimer's disease Mother     Colon cancer Neg Hx        Social History:   reports that he quit smoking about 32 years ago. His smoking use included cigarettes. He started smoking about 53 years ago. He has a 50.00 pack-year smoking history. He has been exposed to tobacco smoke. He has never used smokeless tobacco. He reports that he does not currently use alcohol. He reports that he does not currently use drugs.    Medications:   Medications Prior to Admission   Medication Sig Dispense Refill Last Dose    finasteride (PROSCAR) 5 MG tablet Take 1 tablet by mouth Daily.   Past Week    omeprazole (priLOSEC) 40 MG capsule Take 1 capsule by mouth Daily. Per the ER dept 30 capsule 3 1/31/2024    sucralfate (Carafate) 1 g tablet Take 1 tablet by mouth 3 (Three) Times a Day. 90 tablet 0 Past Week    tamsulosin (FLOMAX) 0.4 MG capsule 24 hr capsule Take 2 capsules by mouth Daily for 360 days. 180 capsule 3 Past Week    tiZANidine (Zanaflex) 4 MG tablet Take 1 tablet by mouth At Night As Needed for Muscle Spasms. 30 tablet 5 Past Week    Eliquis 5 MG tablet tablet TAKE 1 TABLET BY MOUTH DAILY 60 tablet 0 1/29/2024       Allergies:  Patient has no known allergies.        Objective     Blood pressure 134/85, pulse 86, temperature 98.4 °F (36.9 °C), temperature source Temporal, resp. rate 16, weight 67.2 kg (148 lb 2.4 oz), SpO2 96%.    Physical Exam   Constitutional: Pt is oriented to  person, place, and time and well-developed, well-nourished, and in no distress.   Mouth/Throat: Oropharynx is clear and moist.   Neck: Normal range of motion.   Cardiovascular: Normal rate, regular rhythm and normal heart sounds.    Pulmonary/Chest: Effort normal and breath sounds normal.   Abdominal: Soft. Nontender  Skin: Skin is warm and dry.   Psychiatric: Mood, memory, affect and judgment normal.     Assessment & Plan     Diagnosis:  Mesa's  Gerd  PUD  Anemia  constipation    Anticipated Surgical Procedure:  Egd  colonoscopy    The risks, benefits, and alternatives of this procedure have been discussed with the patient or the responsible party- the patient understands and agrees to proceed.

## 2024-02-02 LAB
CYTO UR: NORMAL
LAB AP CASE REPORT: NORMAL
LAB AP CLINICAL INFORMATION: NORMAL
PATH REPORT.FINAL DX SPEC: NORMAL
PATH REPORT.GROSS SPEC: NORMAL

## 2024-02-05 ENCOUNTER — TELEPHONE (OUTPATIENT)
Dept: GASTROENTEROLOGY | Facility: CLINIC | Age: 64
End: 2024-02-05
Payer: COMMERCIAL

## 2024-02-05 NOTE — TELEPHONE ENCOUNTER
2yr egd and 3yr colon recalls and care gaps placed.        Left vm to call office.  Will mail post colon letter. Abilio

## 2024-02-05 NOTE — TELEPHONE ENCOUNTER
----- Message from NATALIE Royal sent at 2/2/2024 12:22 PM EST -----  I have reviewed the patients upper endoscopy and pathology. Esophageal biopsies consistent with Mesa's esophagus, positive for intestinal metaplasia. Stomach and duodenum biopsies normal. Biopsies are negative for H. Pylori, dysplasia, metaplasia, and malignancy.  Patient will need repeat EGD in 2 years for surveillance of Mesa's esophagus.  Please place in recall.  Continue Prilosec 40 mg daily.    I have reviewed the patient colonoscopy and pathology report. Patient has a tubular adenoma polyp on pathology report that was completely removed. It is recommended the patient be on a 3 year recall. Please place in the recall system. Send letter.

## 2024-02-06 ENCOUNTER — TELEPHONE (OUTPATIENT)
Dept: UROLOGY | Facility: CLINIC | Age: 64
End: 2024-02-06
Payer: COMMERCIAL

## 2024-02-06 NOTE — TELEPHONE ENCOUNTER
Caller: Yoni Aleman    Relationship: Self    Best call back number: [phone]    What is the best time to reach you: ANY    Who are you requesting to speak with (clinical staff, provider,  specific staff member): SCHEDULING    Do you know the name of the person who called: AMPARO    What was the call regarding: PT RECVD CALL TO RESCHEDULE APPT.  UNABLE WARM ADHIKARI. PLEASE CALL PT TO RESCHEDULE.

## 2024-03-01 DIAGNOSIS — K21.9 GASTRO-ESOPHAGEAL REFLUX DISEASE WITHOUT ESOPHAGITIS: ICD-10-CM

## 2024-03-01 DIAGNOSIS — R10.13 EPIGASTRIC PAIN: ICD-10-CM

## 2024-03-01 RX ORDER — SUCRALFATE 1 G/1
1 TABLET ORAL 3 TIMES DAILY
Qty: 90 TABLET | Refills: 1 | Status: SHIPPED | OUTPATIENT
Start: 2024-03-01

## 2024-03-12 RX ORDER — OMEPRAZOLE 40 MG/1
40 CAPSULE, DELAYED RELEASE ORAL DAILY
Qty: 90 CAPSULE | Refills: 2 | Status: SHIPPED | OUTPATIENT
Start: 2024-03-12

## 2024-03-18 ENCOUNTER — TELEPHONE (OUTPATIENT)
Dept: NEUROSURGERY | Facility: CLINIC | Age: 64
End: 2024-03-18
Payer: COMMERCIAL

## 2024-03-18 NOTE — TELEPHONE ENCOUNTER
Caller: FORTINO PORTER    Relationship to patient: S.O    Best call back number: [phone]    Chief complaint: AFTER PHYSICAL     Type of visit: FOLLOW UP    Requested date: ASAP     If rescheduling, when is the original appointment: NA     Additional notes:PTS S[facility]O CALLED AND STATES THAT PATIENT WAS NOT ABLE TO COMPLETE HIS MRI-PT WAS TOLD TO DO PHYSICAL THERAPY -PT COMPLETED 1 MONTH  PHYSICAL THERAPY AND IS READY TO SCHEDULE HIS MRI-PT STATES THAT HE WOULD LIKE TO HAVE THE MRI IN [place] -PTS S.O STATES THAT THE PHYSICAL THERAPY OFFICE WAS SUPPOSED TO HAVE SENT THEIR RECORDS OVER TO OUR OFFICE-PATIENT HAD THERAPY AT [facility] [place] TELE. [phone]- [phone] FAX -SENDING TO OFFICE TO ADVISE THANK YOU

## 2024-03-20 DIAGNOSIS — M48.061 FORAMINAL STENOSIS OF LUMBAR REGION: Primary | ICD-10-CM

## 2024-03-20 DIAGNOSIS — M54.42 CHRONIC MIDLINE LOW BACK PAIN WITH BILATERAL SCIATICA: ICD-10-CM

## 2024-03-20 DIAGNOSIS — M51.36 DDD (DEGENERATIVE DISC DISEASE), LUMBAR: ICD-10-CM

## 2024-03-20 DIAGNOSIS — M47.816 LUMBAR FACET ARTHROPATHY: ICD-10-CM

## 2024-03-20 DIAGNOSIS — M54.41 CHRONIC MIDLINE LOW BACK PAIN WITH BILATERAL SCIATICA: ICD-10-CM

## 2024-03-20 DIAGNOSIS — G89.29 CHRONIC MIDLINE LOW BACK PAIN WITH BILATERAL SCIATICA: ICD-10-CM

## 2024-03-20 NOTE — TELEPHONE ENCOUNTER
New MRI order has been submitted. Patient requested last MRI to be completed at [facility].

## 2024-03-29 ENCOUNTER — TELEPHONE (OUTPATIENT)
Dept: FAMILY MEDICINE CLINIC | Facility: CLINIC | Age: 64
End: 2024-03-29
Payer: COMMERCIAL

## 2024-03-29 DIAGNOSIS — I82.492 ACUTE DEEP VEIN THROMBOSIS (DVT) OF OTHER SPECIFIED VEIN OF LEFT LOWER EXTREMITY: ICD-10-CM

## 2024-03-29 RX ORDER — APIXABAN 5 MG/1
5 TABLET, FILM COATED ORAL DAILY
Qty: 60 TABLET | Refills: 0 | Status: SHIPPED | OUTPATIENT
Start: 2024-03-29

## 2024-04-10 ENCOUNTER — TELEPHONE (OUTPATIENT)
Dept: NEUROSURGERY | Facility: CLINIC | Age: 64
End: 2024-04-10
Payer: COMMERCIAL

## 2024-04-10 RX ORDER — OMEPRAZOLE 40 MG/1
40 CAPSULE, DELAYED RELEASE ORAL DAILY
Qty: 90 CAPSULE | Refills: 2 | OUTPATIENT
Start: 2024-04-10

## 2024-04-10 NOTE — TELEPHONE ENCOUNTER
Sara with [facility] financial clearance called to advise patient's MRI has been denied by insurance. They want to make sure it is ok to cancel? # [phone].

## 2024-04-10 NOTE — TELEPHONE ENCOUNTER
Yes, fine to cancel.    I would be willing to refer him for PT and to x-ray of the lumbar spine - if PT did not help then we could potential get MRI approved later.

## 2024-04-17 DIAGNOSIS — K21.9 GASTRO-ESOPHAGEAL REFLUX DISEASE WITHOUT ESOPHAGITIS: ICD-10-CM

## 2024-04-17 DIAGNOSIS — R10.13 EPIGASTRIC PAIN: ICD-10-CM

## 2024-04-17 RX ORDER — SUCRALFATE 1 G/1
1 TABLET ORAL 3 TIMES DAILY
Qty: 270 TABLET | Refills: 1 | Status: SHIPPED | OUTPATIENT
Start: 2024-04-17

## 2024-04-24 ENCOUNTER — CONSULT (OUTPATIENT)
Dept: ONCOLOGY | Facility: HOSPITAL | Age: 64
End: 2024-04-24
Payer: COMMERCIAL

## 2024-04-24 VITALS
BODY MASS INDEX: 23.5 KG/M2 | RESPIRATION RATE: 16 BRPM | SYSTOLIC BLOOD PRESSURE: 142 MMHG | TEMPERATURE: 99.3 F | HEART RATE: 79 BPM | OXYGEN SATURATION: 95 % | DIASTOLIC BLOOD PRESSURE: 90 MMHG | WEIGHT: 147.8 LBS

## 2024-04-24 DIAGNOSIS — I82.4Y2 DEEP VEIN THROMBOSIS (DVT) OF PROXIMAL VEIN OF LEFT LOWER EXTREMITY, UNSPECIFIED CHRONICITY: Primary | ICD-10-CM

## 2024-04-24 PROCEDURE — G0463 HOSPITAL OUTPT CLINIC VISIT: HCPCS | Performed by: NURSE PRACTITIONER
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
## 2024-04-25 ENCOUNTER — TELEPHONE (OUTPATIENT)
Dept: ONCOLOGY | Facility: HOSPITAL | Age: 64
End: 2024-04-25
Payer: COMMERCIAL

## 2024-04-25 NOTE — TELEPHONE ENCOUNTER
Called and left patient a detailed voicemail letting him know that I have him scheduled to see Dr. Lepe for a follow up appt on 6/17/24 at 3:30 PM. Patient lives near [place], so I let him know to get labs there 1 week prior unless he would like to get him here, if so, then he can call back and schedule him accordingly.    Today April 30, 2018 you received the    Tetanus (Tdap) Vaccine    Typhoid - Oral. This prescription has been faxed to your pharmacy, please take as directed.   .    These appointments can be made as a NURSE ONLY visit.    **It is very important for the vaccinations to be given on the scheduled day(s), this helps ensure you receive the full effectiveness of the vaccine.**    Please call [facility] with any questions [phone]    Thank you for visiting [facility]
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
## 2024-05-14 ENCOUNTER — OFFICE VISIT (OUTPATIENT)
Dept: UROLOGY | Facility: CLINIC | Age: 64
End: 2024-05-14
Payer: COMMERCIAL

## 2024-05-14 VITALS
WEIGHT: 147.71 LBS | HEART RATE: 75 BPM | BODY MASS INDEX: 23.74 KG/M2 | SYSTOLIC BLOOD PRESSURE: 125 MMHG | HEIGHT: 66 IN | DIASTOLIC BLOOD PRESSURE: 81 MMHG

## 2024-05-14 DIAGNOSIS — N40.1 BENIGN PROSTATIC HYPERPLASIA WITH LOWER URINARY TRACT SYMPTOMS, SYMPTOM DETAILS UNSPECIFIED: Primary | ICD-10-CM

## 2024-05-14 DIAGNOSIS — R33.9 URINARY RETENTION: ICD-10-CM

## 2024-05-14 DIAGNOSIS — Z12.5 PROSTATE CANCER SCREENING: ICD-10-CM

## 2024-05-14 LAB — URINE VOLUME: 256

## 2024-05-17 ENCOUNTER — PROCEDURE VISIT (OUTPATIENT)
Dept: UROLOGY | Facility: CLINIC | Age: 64
End: 2024-05-17
Payer: COMMERCIAL

## 2024-05-17 DIAGNOSIS — N40.1 BENIGN PROSTATIC HYPERPLASIA WITH LOWER URINARY TRACT SYMPTOMS, SYMPTOM DETAILS UNSPECIFIED: Primary | ICD-10-CM

## 2024-05-17 NOTE — PROGRESS NOTES
Uroflow.    Q-Manuel.  6.0 mL/s    Average flow.  2.4 mL/s    Voided volume.  248 mL    Interval.  20    Patient has severely obstructed uroflow.  Further discussion and treatment is deferred to Dr. Goldstein

## 2024-05-26 DIAGNOSIS — I82.492 ACUTE DEEP VEIN THROMBOSIS (DVT) OF OTHER SPECIFIED VEIN OF LEFT LOWER EXTREMITY: ICD-10-CM

## 2024-05-28 RX ORDER — APIXABAN 5 MG/1
5 TABLET, FILM COATED ORAL DAILY
Qty: 60 TABLET | Refills: 0 | Status: SHIPPED | OUTPATIENT
Start: 2024-05-28

## 2024-06-10 ENCOUNTER — LAB (OUTPATIENT)
Dept: LAB | Facility: HOSPITAL | Age: 64
End: 2024-06-10
Payer: COMMERCIAL

## 2024-06-10 DIAGNOSIS — Z12.5 PROSTATE CANCER SCREENING: ICD-10-CM

## 2024-06-11 ENCOUNTER — TELEPHONE (OUTPATIENT)
Dept: ONCOLOGY | Facility: HOSPITAL | Age: 64
End: 2024-06-11
Payer: COMMERCIAL

## 2024-06-11 ENCOUNTER — LAB (OUTPATIENT)
Dept: LAB | Facility: HOSPITAL | Age: 64
End: 2024-06-11
Payer: COMMERCIAL

## 2024-06-11 DIAGNOSIS — I82.4Y2 DEEP VEIN THROMBOSIS (DVT) OF PROXIMAL VEIN OF LEFT LOWER EXTREMITY, UNSPECIFIED CHRONICITY: ICD-10-CM

## 2024-06-11 LAB
AT III PPP CHRO-ACNC: 119 % (ref 94–138)
PSA SERPL-MCNC: 0.25 NG/ML (ref 0–4)

## 2024-06-11 PROCEDURE — 85303 CLOT INHIBIT PROT C ACTIVITY: CPT

## 2024-06-11 PROCEDURE — 81240 F2 GENE: CPT

## 2024-06-11 PROCEDURE — 85302 CLOT INHIBIT PROT C ANTIGEN: CPT

## 2024-06-11 PROCEDURE — G0103 PSA SCREENING: HCPCS

## 2024-06-11 PROCEDURE — 85306 CLOT INHIBIT PROT S FREE: CPT

## 2024-06-11 PROCEDURE — 85305 CLOT INHIBIT PROT S TOTAL: CPT

## 2024-06-11 PROCEDURE — 85705 THROMBOPLASTIN INHIBITION: CPT

## 2024-06-11 PROCEDURE — 85300 ANTITHROMBIN III ACTIVITY: CPT

## 2024-06-11 PROCEDURE — 85732 THROMBOPLASTIN TIME PARTIAL: CPT

## 2024-06-11 PROCEDURE — 81241 F5 GENE: CPT

## 2024-06-11 PROCEDURE — 85613 RUSSELL VIPER VENOM DILUTED: CPT

## 2024-06-11 PROCEDURE — 86147 CARDIOLIPIN ANTIBODY EA IG: CPT

## 2024-06-11 PROCEDURE — 85670 THROMBIN TIME PLASMA: CPT

## 2024-06-11 PROCEDURE — 36415 COLL VENOUS BLD VENIPUNCTURE: CPT

## 2024-06-11 PROCEDURE — 86146 BETA-2 GLYCOPROTEIN ANTIBODY: CPT

## 2024-06-11 NOTE — TELEPHONE ENCOUNTER
Caller: FORTINO PORTER    Relationship: Emergency Contact    Best call back number:     [phone]     What is the best time to reach you: ANYTIME - OR LEAVE A VM OR TEXT    Who are you requesting to speak with (clinical staff, provider,  specific staff member): NON-CLINICAL    What was the call regarding: PT HAD TO HAVE LABS TODAY AT [facility] THEY WOULD LIKE TO MAKE SURE THAT THE PT'S LABS WILL BE BACK FOR HIS APPT 06/17. IF THE LABS ARE NOT BACK THEN THEY WILL WANT TO R/S.     PLEASE ADVISE IF THE LABS WILL BE BACK IN TIME FOR THE PT'S APPT OR IF THEY WILL NEED TO R/S     Is it okay if the provider responds through [facility]: NO - PLEASE ADVISE PT ONE WAY OR THE OTHER.

## 2024-06-12 LAB
F5 GENE MUT ANL BLD/T: NORMAL
FACTOR II, DNA ANALYSIS: NORMAL

## 2024-06-13 LAB
APTT SCREEN TO CONFIRM RATIO: 1.09 RATIO (ref 0–1.34)
CARDIOLIPIN IGA SER IA-ACNC: <9 APL U/ML (ref 0–11)
CARDIOLIPIN IGG SER IA-ACNC: <9 GPL U/ML (ref 0–14)
CARDIOLIPIN IGM SER IA-ACNC: <9 MPL U/ML (ref 0–12)
CONFIRM APTT/NORMAL: 37.7 SEC (ref 0–47.6)
LA 2 SCREEN W REFLEX-IMP: NORMAL
PROT C ACT/NOR PPP: 116 % (ref 73–180)
PROT C AG ACT/NOR PPP IA: 94 % (ref 60–150)
PROT S ACT/NOR PPP: 86 % (ref 63–140)
PROT S AG ACT/NOR PPP IA: 79 % (ref 60–150)
PROT S FREE AG ACT/NOR PPP IA: 102 % (ref 61–136)
SCREEN APTT: 37.5 SEC (ref 0–43.5)
SCREEN DRVVT: 35.2 SEC (ref 0–47)
THROMBIN TIME: 18.3 SEC (ref 0–23)

## 2024-06-14 LAB
B2 GLYCOPROT1 IGA SER-ACNC: <9 GPI IGA UNITS (ref 0–25)
B2 GLYCOPROT1 IGG SER-ACNC: <9 GPI IGG UNITS (ref 0–20)
B2 GLYCOPROT1 IGM SER-ACNC: <9 GPI IGM UNITS (ref 0–32)

## 2024-06-17 ENCOUNTER — OFFICE VISIT (OUTPATIENT)
Dept: ONCOLOGY | Facility: HOSPITAL | Age: 64
End: 2024-06-17
Payer: COMMERCIAL

## 2024-06-17 VITALS
BODY MASS INDEX: 24.59 KG/M2 | OXYGEN SATURATION: 95 % | TEMPERATURE: 99.4 F | SYSTOLIC BLOOD PRESSURE: 142 MMHG | HEART RATE: 79 BPM | WEIGHT: 153 LBS | HEIGHT: 66 IN | RESPIRATION RATE: 16 BRPM | DIASTOLIC BLOOD PRESSURE: 83 MMHG

## 2024-06-17 DIAGNOSIS — I82.4Y2 DEEP VEIN THROMBOSIS (DVT) OF PROXIMAL VEIN OF LEFT LOWER EXTREMITY, UNSPECIFIED CHRONICITY: Primary | ICD-10-CM

## 2024-06-17 DIAGNOSIS — Z86.711 HISTORY OF PULMONARY EMBOLISM: ICD-10-CM

## 2024-06-17 PROCEDURE — 99213 OFFICE O/P EST LOW 20 MIN: CPT | Performed by: INTERNAL MEDICINE

## 2024-06-17 PROCEDURE — G0463 HOSPITAL OUTPT CLINIC VISIT: HCPCS | Performed by: INTERNAL MEDICINE

## 2024-06-17 PROCEDURE — 1125F AMNT PAIN NOTED PAIN PRSNT: CPT | Performed by: INTERNAL MEDICINE
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
## 2024-06-20 PROBLEM — N40.1 BENIGN PROSTATIC HYPERPLASIA WITH LOWER URINARY TRACT SYMPTOMS: Status: ACTIVE | Noted: 2024-06-20

## 2024-06-21 ENCOUNTER — PROCEDURE VISIT (OUTPATIENT)
Dept: UROLOGY | Facility: CLINIC | Age: 64
End: 2024-06-21
Payer: COMMERCIAL

## 2024-06-21 DIAGNOSIS — N40.1 BENIGN PROSTATIC HYPERPLASIA WITH LOWER URINARY TRACT SYMPTOMS, SYMPTOM DETAILS UNSPECIFIED: Primary | ICD-10-CM

## 2024-06-21 RX ORDER — CIPROFLOXACIN 500 MG/1
500 TABLET, FILM COATED ORAL 2 TIMES DAILY
Qty: 10 TABLET | Refills: 0 | Status: SHIPPED | OUTPATIENT
Start: 2024-06-21 | End: 2024-06-26
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
## 2024-06-30 DIAGNOSIS — N40.1 BENIGN PROSTATIC HYPERPLASIA WITH LOWER URINARY TRACT SYMPTOMS, SYMPTOM DETAILS UNSPECIFIED: ICD-10-CM

## 2024-07-01 RX ORDER — FINASTERIDE 5 MG/1
5 TABLET, FILM COATED ORAL DAILY
Qty: 90 TABLET | Refills: 0 | Status: SHIPPED | OUTPATIENT
Start: 2024-07-01 | End: 2024-07-05

## 2024-07-01 RX ORDER — TAMSULOSIN HYDROCHLORIDE 0.4 MG/1
2 CAPSULE ORAL DAILY
Qty: 180 CAPSULE | Refills: 3 | Status: SHIPPED | OUTPATIENT
Start: 2024-07-01

## 2024-07-03 ENCOUNTER — TELEPHONE (OUTPATIENT)
Dept: SURGERY | Facility: CLINIC | Age: 64
End: 2024-07-03
Payer: COMMERCIAL

## 2024-07-03 ENCOUNTER — TELEPHONE (OUTPATIENT)
Dept: UROLOGY | Facility: CLINIC | Age: 64
End: 2024-07-03
Payer: COMMERCIAL

## 2024-07-03 DIAGNOSIS — N40.1 BENIGN PROSTATIC HYPERPLASIA WITH LOWER URINARY TRACT SYMPTOMS, SYMPTOM DETAILS UNSPECIFIED: ICD-10-CM

## 2024-07-03 NOTE — TELEPHONE ENCOUNTER
Called [facility] and [facility] were closed for lunch, and let them know pt wife is saying his scripts are not there, so I gave a verbal and asked them to call back if they needed anything else, and [facility] called back and stated they got the scripts and would start working on them.

## 2024-07-03 NOTE — TELEPHONE ENCOUNTER
PT CALLED AND STATED THEY NEEDED A REFILL OF FLOMAX AND PROSCAR. PLEASE SEND IT TO [facility] IN [place]. N [phone]

## 2024-07-03 NOTE — TELEPHONE ENCOUNTER
PATIENT'S WIFE CALLED STATING HIS PRESCRIPTIONS NEED TO BE RESENT BECAUSE [facility] IS SAYING THEY DO NOT HAVE THEM.

## 2024-07-03 NOTE — TELEPHONE ENCOUNTER
Called and spoke to pt's wife and let her know that [facility] already sent in new scripts for his Finasteride and Flomax on 7/1/24, that all he has to do is call the pharmacy and tell them to refill, that if he already picked them both up that he wouldn't be out already because the finasteride is a 90 day supply and flomax is for a year, and pt wife verbalized understanding.

## 2024-07-05 RX ORDER — FINASTERIDE 5 MG/1
5 TABLET, FILM COATED ORAL DAILY
Qty: 90 TABLET | Refills: 0 | Status: SHIPPED | OUTPATIENT
Start: 2024-07-05

## 2024-07-14 DIAGNOSIS — M54.42 CHRONIC MIDLINE LOW BACK PAIN WITH BILATERAL SCIATICA: ICD-10-CM

## 2024-07-14 DIAGNOSIS — M54.41 CHRONIC MIDLINE LOW BACK PAIN WITH BILATERAL SCIATICA: ICD-10-CM

## 2024-07-14 DIAGNOSIS — G89.29 CHRONIC MIDLINE LOW BACK PAIN WITH BILATERAL SCIATICA: ICD-10-CM

## 2024-07-15 RX ORDER — TIZANIDINE 4 MG/1
4 TABLET ORAL NIGHTLY PRN
Qty: 30 TABLET | Refills: 2 | Status: SHIPPED | OUTPATIENT
Start: 2024-07-15

## 2024-07-18 ENCOUNTER — TELEPHONE (OUTPATIENT)
Dept: UROLOGY | Facility: CLINIC | Age: 64
End: 2024-07-18
Payer: COMMERCIAL

## 2024-07-18 NOTE — TELEPHONE ENCOUNTER
Hub staff attempted to follow warm transfer process and was unsuccessful     Caller: FORTINO PORTER    Relationship to patient: Emergency Contact    Best call back number: [phone]    Patient is needing: PT IS SCHEDULED FOR AN IN OFFICE PROCEDURE ON 07.23.2024. HIS PAPER SAYS THAT HE NEEDS A URINE CULTURE 2 WEEKS PRIOR.  HE DID NOT HAVE THIS URINE TEST DONE.  DOES HE NEED TO RESCHEDULE? PLEASE CALL TO ADVISE. THANK YOU

## 2024-07-18 NOTE — TELEPHONE ENCOUNTER
Notified pt spouse that we will just dip his urine when he comes in for the procedure, we will not get the results in time.

## 2024-07-21 NOTE — PROGRESS NOTES
REZUM PROCEDURE    After informed consent patient was taken to the procedure room.      Patient was laid with his left side down in fetal position.  Ultrasound probe was placed into the rectum.  Prostate was identified and found to be           cm³.  1% lidocaine plain was used to give a prostatic block.  I gave 20 mL around the prostatic junction between the seminal vesicle on the prostate right around the nerves divided between the sides for a prostatic block.  Patient tolerated this part of the procedure well.  Ultrasound probe was removed      Patient was then laid in dorsolithotomy position he was prepped and draped in normal sterile fashion.  Rezum scope was cycled at the bedside and worked well.  Rezum scope was placed into the urethra.  Anterior urethra was normal.  Prostatic urethra was 1.5 cm long.  At this point I went ahead and did     1    treatments on the left side and then    1   treatments on the right side.  I made sure treatments were 1 cm away from the bladder neck and 1 cm apart.  I made sure to stay up above the verumontanum.    Patient tolerated procedure well, scope was removed.  18 Romanian coudé catheter placed in without issue under sterile technique.  10 cc sterile water placed in balloon displaced straight drainage.    AP 2.4  LR 4.5  Length 3.4  TV 20

## 2024-07-23 ENCOUNTER — PROCEDURE VISIT (OUTPATIENT)
Dept: UROLOGY | Facility: CLINIC | Age: 64
End: 2024-07-23
Payer: COMMERCIAL

## 2024-07-23 DIAGNOSIS — N40.1 BENIGN PROSTATIC HYPERPLASIA WITH LOWER URINARY TRACT SYMPTOMS, SYMPTOM DETAILS UNSPECIFIED: Primary | ICD-10-CM
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
## 2024-07-30 ENCOUNTER — OFFICE VISIT (OUTPATIENT)
Dept: UROLOGY | Facility: CLINIC | Age: 64
End: 2024-07-30
Payer: COMMERCIAL

## 2024-07-30 ENCOUNTER — TELEPHONE (OUTPATIENT)
Dept: UROLOGY | Facility: CLINIC | Age: 64
End: 2024-07-30

## 2024-07-30 VITALS — HEIGHT: 67 IN | BODY MASS INDEX: 24.01 KG/M2 | WEIGHT: 153 LBS

## 2024-07-30 DIAGNOSIS — R33.9 URINARY RETENTION: ICD-10-CM

## 2024-07-30 DIAGNOSIS — N40.1 BENIGN PROSTATIC HYPERPLASIA WITH LOWER URINARY TRACT SYMPTOMS, SYMPTOM DETAILS UNSPECIFIED: Primary | ICD-10-CM

## 2024-07-30 PROCEDURE — 99024 POSTOP FOLLOW-UP VISIT: CPT | Performed by: UROLOGY

## 2024-07-30 PROCEDURE — 1160F RVW MEDS BY RX/DR IN RCRD: CPT | Performed by: UROLOGY

## 2024-07-30 PROCEDURE — 51702 INSERT TEMP BLADDER CATH: CPT | Performed by: UROLOGY

## 2024-07-30 PROCEDURE — 1159F MED LIST DOCD IN RCRD: CPT | Performed by: UROLOGY

## 2024-07-30 NOTE — TELEPHONE ENCOUNTER
Caller:  FORTINO    Relationship: GIRLFRIEND    Best call back number: [phone]    What is your medical concern? PT SAYS HE WAS ONLY ABLE TO PEE A LITTLE BIT AND HE FEELS LIKE HE IS FULL BUT CANNOT PEE, PT WAS THERE EARLIER TODAY TO HAVE CATH REMOVED. PT IS ALSO IN PAIN.    How long has this issue been going on? SINCE TODAY    SAYS THEY LIVE IN [place] AND NEEDS TO KNOW IF THEY NEED TO COME BACK ASAP.

## 2024-07-31 NOTE — PROGRESS NOTES
Procedure   Insert Temp Indwelling Blad Cath, Simple    Date/Time: 7/30/2024 4:30 PM    Performed by: James Valdez RN  Authorized by: Poli Goldstein MD  Preparation: Patient was prepped and draped in the usual sterile fashion.  Local anesthesia used: no    Anesthesia:  Local anesthesia used: no    Sedation:  Patient sedated: no    Patient tolerance: patient tolerated the procedure well with no immediate complications  Comments: Patient presented back to the office due to dysuria and feeling of pain/pressure in his bladder since having catheter removed. Dr Goldstein gave orders to insert 20 coude catheter and with a larger balloon. Under sterile technique, I cleansed genitalia with betadine swabs, instilled lidocaine jelly into the penis, and inserted a 20Fr coude catheter with a 20cc balloon. No difficulty inserting the catheter at all. I did obtain clear yellow urine right away. 425ml drained from bladder. Pt automatically felt relief. Catheter was anchored and leg bag attached. Pt tolerated well with no complaints. He will follow up in about a week for void trial.

## 2024-08-01 ENCOUNTER — TELEPHONE (OUTPATIENT)
Dept: UROLOGY | Facility: CLINIC | Age: 64
End: 2024-08-01
Payer: COMMERCIAL

## 2024-08-01 NOTE — TELEPHONE ENCOUNTER
FORTINO PORTER CALLED. SHE SAID PATIENT CAME IN AND GOT HIS CATHETER REMOVED ON 07/29/24.  HE HAD TO COME BACK IN AND GET THE CATHETER PUT BACK IN, BECAUSE HE COULDN'T PEE.    SHE WANT TO KNOW WHEN HE NEEDS TO COME BACK IN TO HAVE THE CATHETER REMOVED, AND WANTS TO KNOW IF IT IS A WALK-IN OR IF HE NEEDS TO SCHEDULE AN APPOINTMENT.  IF HE NEEDS AN APPOINTMENT, IT NEEDS TO BE AROUND 10:30, IN CASE HE HAS TO COME BACK IN AGAIN.    SHE SAID WHEN HE WAS HERE THE [facility] NURSE SAID THEY WOULD ASK DR. CHAVEZ IF HE WAS GOING TO PRESCRIBE ANOTHER ROUND OF ANTIBIOTICS, TO MAKE SURE HE DOESN'T GET ANOTHER INFECTION, AND IF HE WOULD PRESCRIBE SOMETHING FOR PAIN.  THEY HAVEN'T HEARD BACK.    PHARMACY VERIFIED.    FORTINO SAID TO CALL HER [phone], AND TO KEEP CALLING IF SHE DOESN'T ANSWER.

## 2024-08-01 NOTE — TELEPHONE ENCOUNTER
PER SCOTT PT NEEDS V/T SCHEDULED ON 8/12 @ 10AM W/ KATHY    SPOKE W/ WIFE FORTINO AND SCHEDULED APPT.    SHE ALSO INQUIRED ABOUT PAIN MEDICATION. INFORMED HER NURSE WOULD REACH OUT TO DR. CHAVEZ TO ADVISE

## 2024-08-02 DIAGNOSIS — N40.1 BPH WITH OBSTRUCTION/LOWER URINARY TRACT SYMPTOMS: Primary | ICD-10-CM

## 2024-08-02 DIAGNOSIS — N13.8 BPH WITH OBSTRUCTION/LOWER URINARY TRACT SYMPTOMS: Primary | ICD-10-CM

## 2024-08-02 RX ORDER — HYDROCODONE BITARTRATE AND ACETAMINOPHEN 5; 325 MG/1; MG/1
1 TABLET ORAL EVERY 6 HOURS PRN
Qty: 12 TABLET | Refills: 0 | Status: SHIPPED | OUTPATIENT
Start: 2024-08-02

## 2024-08-05 DIAGNOSIS — N40.1 BENIGN PROSTATIC HYPERPLASIA WITH LOWER URINARY TRACT SYMPTOMS, SYMPTOM DETAILS UNSPECIFIED: ICD-10-CM

## 2024-08-05 RX ORDER — FINASTERIDE 5 MG/1
5 TABLET, FILM COATED ORAL DAILY
Qty: 90 TABLET | Refills: 3 | Status: SHIPPED | OUTPATIENT
Start: 2024-08-05
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
## 2024-08-12 ENCOUNTER — OFFICE VISIT (OUTPATIENT)
Dept: UROLOGY | Facility: CLINIC | Age: 64
End: 2024-08-12
Payer: COMMERCIAL

## 2024-08-12 VITALS — WEIGHT: 153 LBS | RESPIRATION RATE: 16 BRPM | BODY MASS INDEX: 24.01 KG/M2 | HEIGHT: 67 IN

## 2024-08-12 DIAGNOSIS — N40.1 BENIGN PROSTATIC HYPERPLASIA WITH LOWER URINARY TRACT SYMPTOMS, SYMPTOM DETAILS UNSPECIFIED: Primary | ICD-10-CM

## 2024-08-12 PROCEDURE — 1160F RVW MEDS BY RX/DR IN RCRD: CPT | Performed by: UROLOGY

## 2024-08-12 PROCEDURE — 1159F MED LIST DOCD IN RCRD: CPT | Performed by: UROLOGY

## 2024-08-12 PROCEDURE — 99024 POSTOP FOLLOW-UP VISIT: CPT | Performed by: UROLOGY
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
## 2024-08-20 ENCOUNTER — OFFICE VISIT (OUTPATIENT)
Dept: UROLOGY | Facility: CLINIC | Age: 64
End: 2024-08-20
Payer: COMMERCIAL

## 2024-08-20 VITALS — BODY MASS INDEX: 24.01 KG/M2 | HEIGHT: 67 IN | WEIGHT: 153 LBS

## 2024-08-20 DIAGNOSIS — N40.1 BENIGN PROSTATIC HYPERPLASIA WITH LOWER URINARY TRACT SYMPTOMS, SYMPTOM DETAILS UNSPECIFIED: Primary | ICD-10-CM

## 2024-08-20 DIAGNOSIS — R30.0 DYSURIA: ICD-10-CM

## 2024-08-20 LAB — URINE VOLUME: NORMAL

## 2024-08-20 PROCEDURE — 99024 POSTOP FOLLOW-UP VISIT: CPT | Performed by: UROLOGY

## 2024-08-20 NOTE — PROGRESS NOTES
Bladder Scan interpretation 08/20/2024    Estimation of residual urine via BVI 3000 Verathon Bladder Scan  MA/nurse performing: thu MURRAY  Residual Urine: 99 ml  Indication: Benign prostatic hyperplasia with lower urinary tract symptoms, symptom details unspecified    Dysuria   Position: Supine  Examination: Incremental scanning of the suprapubic area using 2.0 MHz transducer using copious amounts of acoustic gel.   Findings: An anechoic area was demonstrated which represented the bladder, with measurement of residual urine as noted. I inspected this myself. In that the residual urine was stable or insignificant, refer to plan for treatment and plan necessary at this time.

## 2024-09-03 ENCOUNTER — OFFICE VISIT (OUTPATIENT)
Dept: FAMILY MEDICINE CLINIC | Facility: CLINIC | Age: 64
End: 2024-09-03
Payer: COMMERCIAL

## 2024-09-03 VITALS
BODY MASS INDEX: 24.33 KG/M2 | SYSTOLIC BLOOD PRESSURE: 140 MMHG | HEART RATE: 91 BPM | HEIGHT: 67 IN | TEMPERATURE: 97.3 F | WEIGHT: 155 LBS | OXYGEN SATURATION: 98 % | DIASTOLIC BLOOD PRESSURE: 88 MMHG

## 2024-09-03 DIAGNOSIS — R53.83 OTHER FATIGUE: ICD-10-CM

## 2024-09-03 DIAGNOSIS — M25.50 ARTHRALGIA, UNSPECIFIED JOINT: ICD-10-CM
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
## 2024-09-03 DIAGNOSIS — M89.8X9 BONE PAIN: ICD-10-CM

## 2024-09-03 DIAGNOSIS — S80.862A TICK BITE OF LEFT LOWER LEG, INITIAL ENCOUNTER: Primary | ICD-10-CM

## 2024-09-03 LAB
25(OH)D3 SERPL-MCNC: 43 NG/ML (ref 30–100)
ALBUMIN SERPL-MCNC: 4.9 G/DL (ref 3.5–5.2)
ALBUMIN/GLOB SERPL: 1.6 G/DL
ALP SERPL-CCNC: 81 U/L (ref 39–117)
ALT SERPL W P-5'-P-CCNC: 8 U/L (ref 1–41)
ANION GAP SERPL CALCULATED.3IONS-SCNC: 11 MMOL/L (ref 5–15)
ASO AB SERPL-ACNC: NEGATIVE [IU]/ML
AST SERPL-CCNC: 11 U/L (ref 1–40)
BASOPHILS # BLD AUTO: 0.04 10*3/MM3 (ref 0–0.2)
BASOPHILS NFR BLD AUTO: 0.4 % (ref 0–1.5)
BILIRUB SERPL-MCNC: 0.6 MG/DL (ref 0–1.2)
BUN SERPL-MCNC: 10 MG/DL (ref 8–23)
BUN/CREAT SERPL: 10.8 (ref 7–25)
CALCIUM SPEC-SCNC: 9.8 MG/DL (ref 8.6–10.5)
CHLORIDE SERPL-SCNC: 101 MMOL/L (ref 98–107)
CHROMATIN AB SERPL-ACNC: <10 IU/ML (ref 0–14)
CO2 SERPL-SCNC: 26 MMOL/L (ref 22–29)
CREAT SERPL-MCNC: 0.93 MG/DL (ref 0.76–1.27)
CRP SERPL-MCNC: <0.3 MG/DL (ref 0–0.5)
DEPRECATED RDW RBC AUTO: 39.5 FL (ref 37–54)
EGFRCR SERPLBLD CKD-EPI 2021: 92.3 ML/MIN/1.73
EOSINOPHIL # BLD AUTO: 0.22 10*3/MM3 (ref 0–0.4)
EOSINOPHIL NFR BLD AUTO: 2.4 % (ref 0.3–6.2)
ERYTHROCYTE [DISTWIDTH] IN BLOOD BY AUTOMATED COUNT: 12.5 % (ref 12.3–15.4)
FERRITIN SERPL-MCNC: 90.1 NG/ML (ref 30–400)
FOLATE SERPL-MCNC: 9.75 NG/ML (ref 4.78–24.2)
GLOBULIN UR ELPH-MCNC: 3 GM/DL
GLUCOSE SERPL-MCNC: 102 MG/DL (ref 65–99)
HCT VFR BLD AUTO: 42.9 % (ref 37.5–51)
HGB BLD-MCNC: 14.2 G/DL (ref 13–17.7)
IMM GRANULOCYTES # BLD AUTO: 0.03 10*3/MM3 (ref 0–0.05)
IMM GRANULOCYTES NFR BLD AUTO: 0.3 % (ref 0–0.5)
IRON 24H UR-MRATE: 69 MCG/DL (ref 59–158)
IRON SATN MFR SERPL: 19 % (ref 20–50)
LYMPHOCYTES # BLD AUTO: 2.74 10*3/MM3 (ref 0.7–3.1)
LYMPHOCYTES NFR BLD AUTO: 29.5 % (ref 19.6–45.3)
MCH RBC QN AUTO: 29 PG (ref 26.6–33)
MCHC RBC AUTO-ENTMCNC: 33.1 G/DL (ref 31.5–35.7)
MCV RBC AUTO: 87.7 FL (ref 79–97)
MONOCYTES # BLD AUTO: 0.59 10*3/MM3 (ref 0.1–0.9)
MONOCYTES NFR BLD AUTO: 6.3 % (ref 5–12)
NEUTROPHILS NFR BLD AUTO: 5.68 10*3/MM3 (ref 1.7–7)
NEUTROPHILS NFR BLD AUTO: 61.1 % (ref 42.7–76)
NRBC BLD AUTO-RTO: 0 /100 WBC (ref 0–0.2)
PLATELET # BLD AUTO: 396 10*3/MM3 (ref 140–450)
PMV BLD AUTO: 11.1 FL (ref 6–12)
POTASSIUM SERPL-SCNC: 3.7 MMOL/L (ref 3.5–5.2)
PROT SERPL-MCNC: 7.9 G/DL (ref 6–8.5)
RBC # BLD AUTO: 4.89 10*6/MM3 (ref 4.14–5.8)
SODIUM SERPL-SCNC: 138 MMOL/L (ref 136–145)
T4 FREE SERPL-MCNC: 1.6 NG/DL (ref 0.92–1.68)
TIBC SERPL-MCNC: 361 MCG/DL (ref 298–536)
TRANSFERRIN SERPL-MCNC: 242 MG/DL (ref 200–360)
TSH SERPL DL<=0.05 MIU/L-ACNC: 1.11 UIU/ML (ref 0.27–4.2)
URATE SERPL-MCNC: 5.2 MG/DL (ref 3.4–7)
VIT B12 BLD-MCNC: 374 PG/ML (ref 211–946)
WBC NRBC COR # BLD AUTO: 9.3 10*3/MM3 (ref 3.4–10.8)

## 2024-09-03 PROCEDURE — 86003 ALLG SPEC IGE CRUDE XTRC EA: CPT

## 2024-09-03 PROCEDURE — 82306 VITAMIN D 25 HYDROXY: CPT

## 2024-09-03 PROCEDURE — 1125F AMNT PAIN NOTED PAIN PRSNT: CPT

## 2024-09-03 PROCEDURE — 82746 ASSAY OF FOLIC ACID SERUM: CPT

## 2024-09-03 PROCEDURE — 86665 EPSTEIN-BARR CAPSID VCA: CPT

## 2024-09-03 PROCEDURE — 86038 ANTINUCLEAR ANTIBODIES: CPT

## 2024-09-03 PROCEDURE — 84550 ASSAY OF BLOOD/URIC ACID: CPT

## 2024-09-03 PROCEDURE — 86140 C-REACTIVE PROTEIN: CPT

## 2024-09-03 PROCEDURE — 82607 VITAMIN B-12: CPT

## 2024-09-03 PROCEDURE — 82785 ASSAY OF IGE: CPT

## 2024-09-03 PROCEDURE — 83540 ASSAY OF IRON: CPT

## 2024-09-03 PROCEDURE — 86063 ANTISTREPTOLYSIN O SCREEN: CPT

## 2024-09-03 PROCEDURE — 80053 COMPREHEN METABOLIC PANEL: CPT

## 2024-09-03 PROCEDURE — 82728 ASSAY OF FERRITIN: CPT

## 2024-09-03 PROCEDURE — 99214 OFFICE O/P EST MOD 30 MIN: CPT

## 2024-09-03 PROCEDURE — 84466 ASSAY OF TRANSFERRIN: CPT

## 2024-09-03 PROCEDURE — 86666 EHRLICHIA ANTIBODY: CPT

## 2024-09-03 PROCEDURE — 84439 ASSAY OF FREE THYROXINE: CPT

## 2024-09-03 PROCEDURE — 84443 ASSAY THYROID STIM HORMONE: CPT

## 2024-09-03 PROCEDURE — 86431 RHEUMATOID FACTOR QUANT: CPT

## 2024-09-03 PROCEDURE — 85025 COMPLETE CBC W/AUTO DIFF WBC: CPT

## 2024-09-03 PROCEDURE — 86008 ALLG SPEC IGE RECOMB EA: CPT

## 2024-09-03 PROCEDURE — 86664 EPSTEIN-BARR NUCLEAR ANTIGEN: CPT

## 2024-09-03 PROCEDURE — 86618 LYME DISEASE ANTIBODY: CPT

## 2024-09-03 PROCEDURE — 86757 RICKETTSIA ANTIBODY: CPT

## 2024-09-03 NOTE — PROGRESS NOTES
"Chief Complaint  Fatigue (Having fatigue and weakness since pulling a tick off his leg about 1-2 months ago)        History of Present Illness:  Yoni Aleman is a 64 y.o. male who presents to [facility] with a past medical history of  Past Medical History:   Diagnosis Date    Deep vein blood clot of left lower extremity     Gastric ulcer     Sciatic nerve pain    Yoni Presents today with complaints of fatigue and recent tick on his leg about 1 to 2 months ago.  He does not take was a scab at first.  He reports the area turned red and swollen.  He reports he had a few days of dizziness, bone and joint pain.  About a week ago he was so tired he could not get out of bed.  He reports he has had fatigue off and on for several years, but it feels worse over the last 1 to 2 months.  He reports he is having a lot more joint pain, bone pain than before.  He reports he still works, but it has becoming hard for him to continue.  He reports a lot of food makes him sick as well, he cannot specify any certain types of food or certain types a day.        Objective   Vital Signs:   Vitals:    09/03/24 1308   BP: 140/88   BP Location: Left arm   Pulse: 91   Temp: 97.3 °F (36.3 °C)   SpO2: 98%   Weight: 70.3 kg (155 lb)   Height: 168.9 cm (66.5\")     Body mass index is 24.64 kg/m².    Wt Readings from Last 3 Encounters:   09/03/24 70.3 kg (155 lb)   08/20/24 69.4 kg (153 lb)   08/12/24 69.4 kg (153 lb)     BP Readings from Last 3 Encounters:   09/03/24 140/88   06/17/24 142/83   05/14/24 125/81       Health Maintenance   Topic Date Due    ZOSTER VACCINE (1 of 2) Never done    ANNUAL PHYSICAL  Never done    INFLUENZA VACCINE  08/01/2024    GASTROSCOPY (EGD)  01/31/2026    COLORECTAL CANCER SCREENING  01/31/2027    TDAP/TD VACCINES (2 - Td or Tdap) 02/05/2027    HEPATITIS C SCREENING  Completed    COVID-19 Vaccine  Completed    Pneumococcal Vaccine 0-64  Aged Out       Review of Systems   Physical " Exam  Vitals reviewed.   Constitutional:       Appearance: Normal appearance.   Eyes:      Pupils: Pupils are equal, round, and reactive to light.   Cardiovascular:      Rate and Rhythm: Normal rate and regular rhythm.      Heart sounds: Normal heart sounds.   Pulmonary:      Effort: Pulmonary effort is normal.      Breath sounds: Normal breath sounds.   Abdominal:      General: Abdomen is flat. Bowel sounds are normal.      Palpations: Abdomen is soft.   Musculoskeletal:         General: Normal range of motion.      Cervical back: Normal range of motion.   Skin:     General: Skin is warm and dry.   Neurological:      General: No focal deficit present.      Mental Status: He is alert and oriented to person, place, and time.   Psychiatric:         Mood and Affect: Mood normal.            Result Review :  The following data was reviewed by: NATALIE Busby on 09/03/2024:  CBC Auto Differential (09/03/2024 13:52)  Urine Culture - Urine, Urine, Clean Catch (06/21/2024 15:47)  Lupus Anticoagulant (06/11/2024 10:37)  PSA Screen (06/11/2024 10:37)  Magnesium (11/06/2023 14:23)  Comprehensive Metabolic Panel (11/06/2023 14:23)  CBC Auto Differential (11/06/2023 14:23)    Office Visit with Meron Hardwick APRN (09/03/2024)  Office Visit with Valeria Avendano APRN (01/02/2024)  Office Visit with Valeria Avendano APRN (11/20/2023)  Office Visit with Kirill Anderson MD (11/06/2023)    Procedures        Assessment and Plan   Diagnoses and all orders for this visit:    1. Tick bite of left lower leg, initial encounter (Primary)  -     Ehrlichia Antibody Panel  -     Lyme Disease Total Antibody With Reflex to Immunoassay  -     Alpha-Gal IgE Panel  -     Spotted Fever Group AB, IgG/IgM  -     TSH+Free T4  -     Comprehensive Metabolic Panel  -     CBC & Differential    2. Other fatigue  -     Ehrlichia Antibody Panel  -     Lyme Disease Total Antibody With Reflex to Immunoassay  -     Alpha-Gal IgE Panel  -      Spotted Fever Group AB, IgG/IgM  -     TSH+Free T4  -     Comprehensive Metabolic Panel  -     CBC & Differential  -     C-reactive protein  -     CHA  -     Rheumatoid Factor  -     Uric acid  -     Antistreptolysin O screen  -     Vitamin B12 & Folate  -     Vitamin D,25-Hydroxy  -     Iron Profile  -     Ferritin  -     EBV Antibody Profile    3. Bone pain  -     Comprehensive Metabolic Panel  -     CBC & Differential  -     C-reactive protein  -     CHA  -     Rheumatoid Factor  -     Uric acid  -     Vitamin D,25-Hydroxy    4. Arthralgia, unspecified joint  -     Ehrlichia Antibody Panel  -     Lyme Disease Total Antibody With Reflex to Immunoassay  -     Alpha-Gal IgE Panel  -     Spotted Fever Group AB, IgG/IgM  -     TSH+Free T4  -     Comprehensive Metabolic Panel  -     CBC & Differential  -     C-reactive protein  -     CHA  -     Rheumatoid Factor  -     Uric acid  -     Antistreptolysin O screen  -     Vitamin B12 & Folate  -     Vitamin D,25-Hydroxy  -     Iron Profile  -     Ferritin  -     EBV Antibody Profile        BMI is within normal parameters. No other follow-up for BMI required.     Patient left lower leg was where he showed he had been bitten by a tick.  Area is well-healed.  Reviewed patient's blood work and chart.  Will run panels on patient to rule out any acute causes of fatigue, bone pain, arthralgias.  Will follow-up with the patient as soon as results are received.  Would like to rule out [place] spotted fever, Lyme disease, alpha gal, [place], rheumatoid arthritis    FOLLOW UP  Return if symptoms worsen or fail to improve.    Patient was given instructions and counseling regarding his condition or for health maintenance advice. Please see specific information pulled into the AVS if appropriate.       NATALIE Busby  09/03/24  17:25 EDT    CURRENT & DISCONTINUED MEDICATIONS  Current Outpatient Medications   Medication Instructions    finasteride (PROSCAR) 5 mg, Oral,  Daily    Lactobacillus (PROBIOTIC ACIDOPHILUS PO) Oral    omeprazole (PRILOSEC) 40 mg, Oral, Daily    sucralfate (CARAFATE) 1 g, Oral, 3 Times Daily    tamsulosin (FLOMAX) 0.8 mg, Oral, Daily       Medications Discontinued During This Encounter   Medication Reason    Eliquis 5 MG tablet tablet *Therapy completed    HYDROcodone-acetaminophen (NORCO) 5-325 MG per tablet *Therapy completed    tiZANidine (ZANAFLEX) 4 MG tablet *Therapy completed

## 2024-09-03 NOTE — PROGRESS NOTES
Venipuncture Blood Specimen Collection  Venipuncture performed in [place] by Oneyda Ozuna with good hemostasis. Patient tolerated the procedure well without complications.   09/03/24   Meron Fry MA

## 2024-09-04 LAB
ANA SER QL: NEGATIVE
B BURGDOR IGG+IGM SER QL IA: NEGATIVE
EBV NA IGG SER IA-ACNC: >600 U/ML (ref 0–17.9)
EBV VCA IGG SER IA-ACNC: 551 U/ML (ref 0–17.9)
EBV VCA IGM SER IA-ACNC: <36 U/ML (ref 0–35.9)
SERVICE CMNT-IMP: ABNORMAL

## 2024-09-05 LAB
A PHAGOCYTOPH IGG TITR SER IF: NEGATIVE {TITER}
A PHAGOCYTOPH IGM TITR SER IF: NEGATIVE {TITER}
E CHAFFEENSIS IGG TITR SER IF: NEGATIVE {TITER}
E CHAFFEENSIS IGM TITR SER IF: NEGATIVE {TITER}
RESULT COMMENT:: NORMAL

## 2024-09-07 LAB
ALPHA-GAL IGE QN: 0.7 KU/L
BEEF IGE QN: 0.42 KU/L
CONV CLASS DESCRIPTION: ABNORMAL
IGE SERPL-ACNC: 171 IU/ML (ref 6–495)
LAMB IGE QN: <0.1 KU/L
PORK IGE QN: 0.19 KU/L

## 2024-09-10 LAB
RESULT COMMENT:: NORMAL
RICK SF IGG TITR SER: NORMAL {TITER}
RICK SF IGM TITR SER: NORMAL {TITER}

## 2024-09-17 ENCOUNTER — OFFICE VISIT (OUTPATIENT)
Dept: UROLOGY | Facility: CLINIC | Age: 64
End: 2024-09-17
Payer: COMMERCIAL

## 2024-09-17 DIAGNOSIS — N40.1 BENIGN PROSTATIC HYPERPLASIA WITH LOWER URINARY TRACT SYMPTOMS, SYMPTOM DETAILS UNSPECIFIED: Primary | ICD-10-CM

## 2024-09-17 LAB — SPECIMEN VOL 24H UR: NORMAL L

## 2024-09-17 PROCEDURE — 99024 POSTOP FOLLOW-UP VISIT: CPT | Performed by: UROLOGY

## 2024-09-17 RX ORDER — L.ACIDOPH/B.ANIMALIS/B.LONGUM 15B CELL
1 CAPSULE ORAL DAILY
Qty: 21 CAPSULE | Refills: 3 | Status: SHIPPED | OUTPATIENT
Start: 2024-09-17 | End: 2024-10-08

## 2024-09-17 RX ORDER — CIPROFLOXACIN 500 MG/1
500 TABLET, FILM COATED ORAL 2 TIMES DAILY
Qty: 42 TABLET | Refills: 0 | Status: SHIPPED | OUTPATIENT
Start: 2024-09-17 | End: 2024-10-08

## 2024-09-18 ENCOUNTER — TELEPHONE (OUTPATIENT)
Dept: UROLOGY | Facility: CLINIC | Age: 64
End: 2024-09-18
Payer: COMMERCIAL

## 2024-10-02 ENCOUNTER — OFFICE VISIT (OUTPATIENT)
Dept: FAMILY MEDICINE CLINIC | Facility: CLINIC | Age: 64
End: 2024-10-02
Payer: COMMERCIAL

## 2024-10-02 VITALS
OXYGEN SATURATION: 96 % | DIASTOLIC BLOOD PRESSURE: 76 MMHG | SYSTOLIC BLOOD PRESSURE: 132 MMHG | HEIGHT: 67 IN | TEMPERATURE: 97.6 F | WEIGHT: 159 LBS | HEART RATE: 95 BPM | BODY MASS INDEX: 24.96 KG/M2

## 2024-10-02 DIAGNOSIS — Z23 INFLUENZA VACCINATION ADMINISTERED AT CURRENT VISIT: ICD-10-CM

## 2024-10-02 DIAGNOSIS — R19.4 ALTERED BOWEL HABITS: ICD-10-CM

## 2024-10-02 DIAGNOSIS — E53.8 LOW SERUM VITAMIN B12: Primary | ICD-10-CM

## 2024-10-02 DIAGNOSIS — R53.83 OTHER FATIGUE: ICD-10-CM

## 2024-10-02 DIAGNOSIS — R06.83 SNORING: ICD-10-CM

## 2024-10-02 DIAGNOSIS — R73.01 ELEVATED FASTING GLUCOSE: ICD-10-CM

## 2024-10-02 LAB
ALBUMIN SERPL-MCNC: 4.7 G/DL (ref 3.5–5.2)
ALBUMIN/GLOB SERPL: 1.7 G/DL
ALP SERPL-CCNC: 67 U/L (ref 39–117)
ALT SERPL W P-5'-P-CCNC: 12 U/L (ref 1–41)
ANION GAP SERPL CALCULATED.3IONS-SCNC: 10.8 MMOL/L (ref 5–15)
AST SERPL-CCNC: 16 U/L (ref 1–40)
BILIRUB SERPL-MCNC: 0.7 MG/DL (ref 0–1.2)
BUN SERPL-MCNC: 12 MG/DL (ref 8–23)
BUN/CREAT SERPL: 11.5 (ref 7–25)
CALCIUM SPEC-SCNC: 9.9 MG/DL (ref 8.6–10.5)
CHLORIDE SERPL-SCNC: 102 MMOL/L (ref 98–107)
CK SERPL-CCNC: 82 U/L (ref 20–200)
CO2 SERPL-SCNC: 25.2 MMOL/L (ref 22–29)
CREAT SERPL-MCNC: 1.04 MG/DL (ref 0.76–1.27)
EGFRCR SERPLBLD CKD-EPI 2021: 80.7 ML/MIN/1.73
ERYTHROCYTE [SEDIMENTATION RATE] IN BLOOD: 2 MM/HR (ref 0–20)
GLOBULIN UR ELPH-MCNC: 2.8 GM/DL
GLUCOSE SERPL-MCNC: 92 MG/DL (ref 65–99)
HBA1C MFR BLD: 5.6 % (ref 4.8–5.6)
MAGNESIUM SERPL-MCNC: 2.3 MG/DL (ref 1.6–2.4)
POTASSIUM SERPL-SCNC: 4.7 MMOL/L (ref 3.5–5.2)
PROT SERPL-MCNC: 7.5 G/DL (ref 6–8.5)
SODIUM SERPL-SCNC: 138 MMOL/L (ref 136–145)

## 2024-10-02 PROCEDURE — 86003 ALLG SPEC IGE CRUDE XTRC EA: CPT

## 2024-10-02 PROCEDURE — 83735 ASSAY OF MAGNESIUM: CPT

## 2024-10-02 PROCEDURE — 1160F RVW MEDS BY RX/DR IN RCRD: CPT

## 2024-10-02 PROCEDURE — 99214 OFFICE O/P EST MOD 30 MIN: CPT

## 2024-10-02 PROCEDURE — 1159F MED LIST DOCD IN RCRD: CPT

## 2024-10-02 PROCEDURE — 90656 IIV3 VACC NO PRSV 0.5 ML IM: CPT

## 2024-10-02 PROCEDURE — 82550 ASSAY OF CK (CPK): CPT

## 2024-10-02 PROCEDURE — 80053 COMPREHEN METABOLIC PANEL: CPT

## 2024-10-02 PROCEDURE — 90471 IMMUNIZATION ADMIN: CPT

## 2024-10-02 PROCEDURE — 85652 RBC SED RATE AUTOMATED: CPT

## 2024-10-02 PROCEDURE — 83036 HEMOGLOBIN GLYCOSYLATED A1C: CPT

## 2024-10-02 PROCEDURE — 1125F AMNT PAIN NOTED PAIN PRSNT: CPT

## 2024-10-02 RX ORDER — LANOLIN ALCOHOL/MO/W.PET/CERES
1000 CREAM (GRAM) TOPICAL DAILY
Qty: 30 TABLET | Refills: 3 | Status: SHIPPED | OUTPATIENT
Start: 2024-10-02

## 2024-10-02 NOTE — PROGRESS NOTES
"Chief Complaint  Fatigue (He is still very tired all the time.  He wants to go over his lab results. )    PHQ-2 Total Score: 0   PHQ-9 Total Score: 0     History of Present Illness:  Yoni Aleman is a 64 y.o. male who presents to [facility] with a past medical history of  Past Medical History:   Diagnosis Date    Deep vein blood clot of left lower extremity     Gastric ulcer     Sciatic nerve pain      Yoni presents today with continued complaints of fatigue and wanting to go over his lab results from last month.  He reports he continues to have fatigue, but this is not new.  He reports that he has had fatigue for years.  He reports he is trying to change his diet due to the alpha gal, but still does not feel like this is what is causing his stomach issues.         Objective   Vital Signs:   Vitals:    10/02/24 1306   BP: 132/76   Pulse: 95   Temp: 97.6 °F (36.4 °C)   TempSrc: Temporal   SpO2: 96%   Weight: 72.1 kg (159 lb)   Height: 168.9 cm (66.5\")   PainSc:   2     Body mass index is 25.28 kg/m².    Wt Readings from Last 3 Encounters:   10/02/24 72.1 kg (159 lb)   09/03/24 70.3 kg (155 lb)   08/20/24 69.4 kg (153 lb)     BP Readings from Last 3 Encounters:   10/02/24 132/76   09/03/24 140/88   06/17/24 142/83       Health Maintenance   Topic Date Due    ZOSTER VACCINE (1 of 2) Never done    ANNUAL PHYSICAL  Never done    BMI FOLLOWUP  01/23/2025    GASTROSCOPY (EGD)  01/31/2026    COLORECTAL CANCER SCREENING  01/31/2027    TDAP/TD VACCINES (2 - Td or Tdap) 02/05/2027    HEPATITIS C SCREENING  Completed    COVID-19 Vaccine  Completed    INFLUENZA VACCINE  Completed    Pneumococcal Vaccine 0-64  Aged Out       Review of Systems   Physical Exam  Vitals reviewed.   Constitutional:       Appearance: Normal appearance.   Eyes:      Pupils: Pupils are equal, round, and reactive to light.   Cardiovascular:      Rate and Rhythm: Normal rate and regular rhythm.   Pulmonary:      Effort: " Pulmonary effort is normal.      Breath sounds: Normal breath sounds.   Skin:     General: Skin is warm and dry.   Neurological:      General: No focal deficit present.      Mental Status: He is alert and oriented to person, place, and time.   Psychiatric:         Mood and Affect: Mood normal.            Result Review :  The following data was reviewed by: NATALIE Busby on 10/02/2024:  Office Visit on 09/17/2024   Component Date Value    Volume 09/17/2024 172ML    Office Visit on 09/03/2024   Component Date Value    E. chaffeensis (HME) IgG* 09/03/2024 Negative     E. chaffeensis (HME) IgM* 09/03/2024 Negative     HGE IgG Titer 09/03/2024 Negative     HGE IgM Titer 09/03/2024 Negative     RESULT COMMENT: 09/03/2024 Comment     Lyme Total Antibody EIA 09/03/2024 Negative     Class Description 09/03/2024 Comment     IgE 09/03/2024 171     Pork 09/03/2024 0.19 (A)     Beef 09/03/2024 0.42 (A)     Siegel 09/03/2024 <0.10     [id number] Alpha-Gal 09/03/2024 0.70 (A)     Spotted Fever Group IgG 09/03/2024 <1:64     Spotted Fever Group IgM 09/03/2024 <1:64     RESULT COMMENT: 09/03/2024 Comment     TSH 09/03/2024 1.110     Free T4 09/03/2024 1.60     Glucose 09/03/2024 102 (H)     BUN 09/03/2024 10     Creatinine 09/03/2024 0.93     Sodium 09/03/2024 138     Potassium 09/03/2024 3.7     Chloride 09/03/2024 101     CO2 09/03/2024 26.0     Calcium 09/03/2024 9.8     Total Protein 09/03/2024 7.9     Albumin 09/03/2024 4.9     ALT (SGPT) 09/03/2024 8     AST (SGOT) 09/03/2024 11     Alkaline Phosphatase 09/03/2024 81     Total Bilirubin 09/03/2024 0.6     Globulin 09/03/2024 3.0     A/G Ratio 09/03/2024 1.6     BUN/Creatinine Ratio 09/03/2024 10.8     Anion Gap 09/03/2024 11.0     eGFR 09/03/2024 92.3     C-Reactive Protein 09/03/2024 <0.30     CHA Direct 09/03/2024 Negative     Rheumatoid Factor Quanti* 09/03/2024 <10.0     Uric Acid 09/03/2024 5.2     ASO 09/03/2024 Negative     Folate 09/03/2024 9.75     Vitamin B-12  09/03/2024 374     25 Hydroxy, Vitamin D 09/03/2024 43.0     Iron 09/03/2024 69     Iron Saturation (TSAT) 09/03/2024 19 (L)     Transferrin 09/03/2024 242     TIBC 09/03/2024 361     Ferritin 09/03/2024 90.10     EBV VCA IgM 09/03/2024 <36.0     EBV VCA IgG 09/03/2024 551.0 (H)     EBV Nuclear Antigen Ab, * 09/03/2024 >600.0 (H)     Interpretation 09/03/2024 Comment     WBC 09/03/2024 9.30     RBC 09/03/2024 4.89     Hemoglobin 09/03/2024 14.2     Hematocrit 09/03/2024 42.9     MCV 09/03/2024 87.7     MCH 09/03/2024 29.0     MCHC 09/03/2024 33.1     RDW 09/03/2024 12.5     RDW-SD 09/03/2024 39.5     MPV 09/03/2024 11.1     Platelets 09/03/2024 396     Neutrophil % 09/03/2024 61.1     Lymphocyte % 09/03/2024 29.5     Monocyte % 09/03/2024 6.3     Eosinophil % 09/03/2024 2.4     Basophil % 09/03/2024 0.4     Immature Grans % 09/03/2024 0.3     Neutrophils, Absolute 09/03/2024 5.68     Lymphocytes, Absolute 09/03/2024 2.74     Monocytes, Absolute 09/03/2024 0.59     Eosinophils, Absolute 09/03/2024 0.22     Basophils, Absolute 09/03/2024 0.04     Immature Grans, Absolute 09/03/2024 0.03     nRBC 09/03/2024 0.0        Procedures        Assessment and Plan   Diagnoses and all orders for this visit:    1. Low serum vitamin B12 (Primary)  -     vitamin B-12 (CYANOCOBALAMIN) 1000 MCG tablet; Take 1 tablet by mouth Daily.  Dispense: 30 tablet; Refill: 3    2. Altered bowel habits  -     Food Allergy Profile    3. Other fatigue  -     Food Allergy Profile  -     Magnesium  -     CK  -     Sedimentation Rate    4. Snoring  -     Ambulatory Referral to Sleep Medicine    5. Elevated fasting glucose  -     Hemoglobin A1c  -     Comprehensive Metabolic Panel    6. Influenza vaccination administered at current visit  -     Fluzone >6mos        Questioned him what he was eating, and did have to explain allergy of the report to him.  Educated him on derivatives such as milk, cheese, encasings, and other fillers would also cause  allergy flareup.  Explained to him it is safe to stick with chicken and fish.  He would like an allergy food profile.  He did have an elevated fasting glucose I will check a hemoglobin A1c.  His B12 was on the lower end of normal, so we will prescribe a daily vitamin B12 supplement.  We went over other causes of fatigue and noted that he does snore.  At first he was not agreeable to sleep study, but he says he would be at this time.  Sent over referral to sleep medicine.     Patient agreeable to influenza vaccination today.    FOLLOW UP  Return in about 4 weeks (around 10/30/2024) for Annual physical.    Patient was given instructions and counseling regarding his condition or for health maintenance advice. Please see specific information pulled into the AVS if appropriate.       NATALIE Busby  10/02/24  15:47 EDT    CURRENT & DISCONTINUED MEDICATIONS  Current Outpatient Medications   Medication Instructions    finasteride (PROSCAR) 5 mg, Oral, Daily    omeprazole (PRILOSEC) 40 mg, Oral, Daily    sucralfate (CARAFATE) 1 g, Oral, 3 Times Daily    tamsulosin (FLOMAX) 0.8 mg, Oral, Daily    vitamin B-12 (CYANOCOBALAMIN) 1,000 mcg, Oral, Daily       Medications Discontinued During This Encounter   Medication Reason    ciprofloxacin (Cipro) 500 MG tablet Non-compliance    Lactobacillus (PROBIOTIC ACIDOPHILUS PO) *Therapy completed    Probiotic Product (Florajen Digestion) capsule Non-compliance        EMR Dragon/Transcription disclaimer:  Parts of this encounter note are electronic transcription/translation of spoken language to printed text. The electronic translation of spoken language may permit erroneous, or at times, nonsensical words or phrases to be inadvertently transcribed. Although I have reviewed the note for such errors, some may still exist.

## 2024-10-06 LAB
CLAM IGE QN: <0.1 KU/L
CODFISH IGE QN: <0.1 KU/L
CONV CLASS DESCRIPTION: ABNORMAL
CORN IGE QN: <0.1 KU/L
COW MILK IGE QN: <0.1 KU/L
EGG WHITE IGE QN: <0.1 KU/L
PEANUT IGE QN: <0.1 KU/L
SCALLOP IGE QN: <0.1 KU/L
SESAME SEED IGE QN: <0.1 KU/L
SHRIMP IGE QN: 0.39 KU/L
SOYBEAN IGE QN: <0.1 KU/L
WALNUT IGE QN: <0.1 KU/L
WHEAT IGE QN: <0.1 KU/L

## 2024-10-26 DIAGNOSIS — K21.9 GASTRO-ESOPHAGEAL REFLUX DISEASE WITHOUT ESOPHAGITIS: ICD-10-CM

## 2024-10-26 DIAGNOSIS — R10.13 EPIGASTRIC PAIN: ICD-10-CM
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
## 2024-10-28 RX ORDER — SUCRALFATE 1 G/1
1 TABLET ORAL 3 TIMES DAILY
Qty: 270 TABLET | Refills: 1 | Status: SHIPPED | OUTPATIENT
Start: 2024-10-28

## 2024-10-29 ENCOUNTER — OFFICE VISIT (OUTPATIENT)
Dept: UROLOGY | Age: 64
End: 2024-10-29
Payer: COMMERCIAL

## 2024-10-29 VITALS — BODY MASS INDEX: 24.96 KG/M2 | HEIGHT: 67 IN | WEIGHT: 159 LBS | RESPIRATION RATE: 18 BRPM

## 2024-10-29 DIAGNOSIS — N40.1 BENIGN PROSTATIC HYPERPLASIA WITH LOWER URINARY TRACT SYMPTOMS, SYMPTOM DETAILS UNSPECIFIED: Primary | ICD-10-CM

## 2024-10-29 LAB
BILIRUB BLD-MCNC: NEGATIVE MG/DL
CLARITY, POC: ABNORMAL
COLOR UR: ABNORMAL
EXPIRATION DATE: ABNORMAL
GLUCOSE UR STRIP-MCNC: NEGATIVE MG/DL
KETONES UR QL: NEGATIVE
LEUKOCYTE EST, POC: NEGATIVE
Lab: ABNORMAL
NITRITE UR-MCNC: NEGATIVE MG/ML
PH UR: 6 [PH] (ref 5–8)
PROT UR STRIP-MCNC: NEGATIVE MG/DL
RBC # UR STRIP: ABNORMAL /UL
SP GR UR: 1.02 (ref 1–1.03)
UROBILINOGEN UR QL: NORMAL

## 2024-11-04 ENCOUNTER — OFFICE VISIT (OUTPATIENT)
Dept: SLEEP MEDICINE | Facility: HOSPITAL | Age: 64
End: 2024-11-04
Payer: COMMERCIAL

## 2024-11-04 VITALS
OXYGEN SATURATION: 95 % | WEIGHT: 156.2 LBS | HEART RATE: 85 BPM | SYSTOLIC BLOOD PRESSURE: 125 MMHG | DIASTOLIC BLOOD PRESSURE: 78 MMHG | HEIGHT: 67 IN | BODY MASS INDEX: 24.52 KG/M2

## 2024-11-04 DIAGNOSIS — R06.81 WITNESSED EPISODE OF APNEA: ICD-10-CM

## 2024-11-04 DIAGNOSIS — Z87.891 FORMER CIGARETTE SMOKER: ICD-10-CM

## 2024-11-04 DIAGNOSIS — R29.818 SUSPECTED SLEEP APNEA: Primary | ICD-10-CM

## 2024-11-04 DIAGNOSIS — Z91.51 PERSONAL HISTORY OF SUICIDAL BEHAVIOR: ICD-10-CM

## 2024-11-04 DIAGNOSIS — Z72.821 INADEQUATE SLEEP HYGIENE: ICD-10-CM

## 2024-11-04 DIAGNOSIS — R06.83 SNORING: ICD-10-CM

## 2024-11-04 DIAGNOSIS — R53.83 OTHER FATIGUE: ICD-10-CM

## 2024-11-04 DIAGNOSIS — R06.89 ADVENTITIOUS BREATH SOUNDS: ICD-10-CM

## 2024-11-04 DIAGNOSIS — F41.9 ANXIETY: ICD-10-CM

## 2024-11-04 PROCEDURE — G0463 HOSPITAL OUTPT CLINIC VISIT: HCPCS | Performed by: FAMILY MEDICINE

## 2024-11-04 RX ORDER — L.ACID,CASEI/B.ANIMAL/S.THERMO 16B CELL
1 CAPSULE ORAL DAILY
COMMUNITY
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
## 2024-11-05 DIAGNOSIS — N40.1 BENIGN PROSTATIC HYPERPLASIA WITH LOWER URINARY TRACT SYMPTOMS, SYMPTOM DETAILS UNSPECIFIED: ICD-10-CM

## 2024-11-05 RX ORDER — TAMSULOSIN HYDROCHLORIDE 0.4 MG/1
2 CAPSULE ORAL DAILY
Qty: 60 CAPSULE | OUTPATIENT
Start: 2024-11-05

## 2024-11-12 ENCOUNTER — OFFICE VISIT (OUTPATIENT)
Dept: FAMILY MEDICINE CLINIC | Facility: CLINIC | Age: 64
End: 2024-11-12
Payer: COMMERCIAL

## 2024-11-12 VITALS
BODY MASS INDEX: 24.64 KG/M2 | TEMPERATURE: 98 F | HEART RATE: 89 BPM | SYSTOLIC BLOOD PRESSURE: 126 MMHG | DIASTOLIC BLOOD PRESSURE: 82 MMHG | HEIGHT: 67 IN | WEIGHT: 157 LBS | OXYGEN SATURATION: 97 %

## 2024-11-12 DIAGNOSIS — K44.9 HIATAL HERNIA: ICD-10-CM

## 2024-11-12 DIAGNOSIS — T78.1XXA ALLERGIC REACTION TO ALPHA-GAL: ICD-10-CM

## 2024-11-12 DIAGNOSIS — Z23 NEED FOR COVID-19 VACCINE: ICD-10-CM

## 2024-11-12 DIAGNOSIS — Z00.00 ANNUAL PHYSICAL EXAM: Primary | ICD-10-CM

## 2024-11-12 DIAGNOSIS — Z90.81 HISTORY OF TOTAL SPLENECTOMY: ICD-10-CM

## 2024-11-12 PROCEDURE — 1125F AMNT PAIN NOTED PAIN PRSNT: CPT | Performed by: NURSE PRACTITIONER

## 2024-11-12 PROCEDURE — 99396 PREV VISIT EST AGE 40-64: CPT | Performed by: NURSE PRACTITIONER

## 2024-11-12 PROCEDURE — 1160F RVW MEDS BY RX/DR IN RCRD: CPT | Performed by: NURSE PRACTITIONER

## 2024-11-12 PROCEDURE — 90480 ADMN SARSCOV2 VAC 1/ONLY CMP: CPT | Performed by: NURSE PRACTITIONER

## 2024-11-12 PROCEDURE — 1159F MED LIST DOCD IN RCRD: CPT | Performed by: NURSE PRACTITIONER

## 2024-11-12 PROCEDURE — 91320 SARSCV2 VAC 30MCG TRS-SUC IM: CPT | Performed by: NURSE PRACTITIONER

## 2024-11-12 NOTE — PROGRESS NOTES
Chief Complaint  Annual Exam    Subjective            Yoni Aleman presents to [facility]  History of Present Illness  Pt her for the annual wellness--receiving COVID vax today     And also had questions with alpha-gal--and been avoiding the beef and pork and dairy as best he can--    And then feeling better --somewhat--and the fatigued not as bad as before    Then saw the sleep medicine specialist--and then they are going to proceed with the sleep study and then they referred him to the psych Dr Estrada--so he  can have further eval    Then also reports the gastro --in Jan 2024 they did the cscope and EGD and found polpys and they will be repeating the cscope at 4 yrs then regarding the EGD they found Barrets esophagus and HH and then they will repeat in 2 yrs and they have him continuing the protonix 40 mg QD for now--and admits to drinking a lot of sodas and then also trying to cut out the the sugar and then the fried foods     And then pt also had the surgery with Dr Goldstein for the enlarged prostate    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 in Nov 2022 spleen removed and through [facility] and they did all the updated immunizations at that time--and then now pt will be due his next shot for the meningitis B--next yr--then the others will be due for 11/2027 -      PHQ-2 Total Score:    PHQ-9 Total Score:      Past Medical History:   Diagnosis Date    Acid reflux     Anxiety     Deep vein blood clot of left lower extremity     Gastric ulcer     Insomnia     Sciatic nerve pain     Scoliosis        No Known Allergies     Past Surgical History:   Procedure Laterality Date    COLONOSCOPY N/A 1/31/2024    Procedure: COLONOSCOPY WITH HOT SNARE POLYPECTOMY;  Surgeon: Juno Anderson MD;  Location: [facility] ENDOSCOPY;  Service: Gastroenterology;  Laterality: N/A;  COLON POLYP    ENDOSCOPY N/A 1/31/2024    Procedure: ESOPHAGOGASTRODUODENOSCOPY WITH BIOPSIES;  Surgeon: Juno Anderson MD;   Location: [facility] ENDOSCOPY;  Service: Gastroenterology;  Laterality: N/A;  HIATAL HERNIA, BARRETTS ESOPHAGUS    SPLENECTOMY      UPPER GASTROINTESTINAL ENDOSCOPY          Social History     Tobacco Use    Smoking status: Former     Current packs/day: 0.00     Average packs/day: 2.0 packs/day for 25.0 years (50.0 ttl pk-yrs)     Types: Cigarettes     Start date:      Quit date:      Years since quittin.8     Passive exposure: Past    Smokeless tobacco: Never   Vaping Use    Vaping status: Never Used   Substance Use Topics    Alcohol use: Not Currently    Drug use: Not Currently       Family History   Problem Relation Age of Onset    Mental illness Mother     Hypertension Mother     Alzheimer's disease Mother     Insomnia Father     Colon cancer Neg Hx         Health Maintenance Due   Topic Date Due    ANNUAL PHYSICAL  Never done    COVID-19 Vaccine (10 - 2024-25 season) 2024        Current Outpatient Medications on File Prior to Visit   Medication Sig    finasteride (PROSCAR) 5 MG tablet TAKE 1 TABLET BY MOUTH EVERY DAY    omeprazole (priLOSEC) 40 MG capsule TAKE 1 CAPSULE BY MOUTH DAILY    Probiotic Product (Risaquad-2) capsule capsule Take 1 capsule by mouth Daily.    sucralfate (CARAFATE) 1 g tablet TAKE 1 TABLET BY MOUTH THREE TIMES DAILY    tamsulosin (FLOMAX) 0.4 MG capsule 24 hr capsule TAKE 2 CAPSULES BY MOUTH DAILY    vitamin B-12 (CYANOCOBALAMIN) 1000 MCG tablet Take 1 tablet by mouth Daily.     No current facility-administered medications on file prior to visit.       Immunization History   Administered Date(s) Administered    COVID-19 (MODERNA) 12YRS+ (SPIKEVAX) 2024    COVID-19 (MODERNA) 1st,2nd,3rd Dose Monovalent 2021, 2021    COVID-19 (MODERNA) BIVALENT 12+YRS 2022    COVID-19 (MODERNA) Monovalent Original Booster 2022, 2022    COVID-19 (UNSPECIFIED) 2021, 2021, 2022    Fluzone  >6mos 10/02/2024    Fluzone (or Fluarix & Flulaval  "for VFC) >6mos 11/07/2022, 12/15/2023    Fluzone Quad >6mos (Multi-dose) 12/06/2018, 10/24/2019, 12/17/2020    Hib (PRP-T) 11/21/2022    Influenza Injectable Mdck Pf Quad 10/17/2017    Influenza Seasonal Injectable 01/28/2022    Influenza, Unspecified 10/17/2017, 01/28/2022    Meningococcal ACYW (MENQUADFI) 11/21/2022    Meningococcal B,(Bexsero) 11/21/2022    Meningococcal Conjugate 01/19/2023, 01/02/2024    Meningococcal MCV4P (Menactra) 11/21/2022    Pneumococcal Conjugate 13-Valent (PCV13) 11/21/2022    Pneumococcal Conjugate 20-Valent (PCV20) 01/19/2023    Tdap 02/05/2017    Trumenba(meningococcal B) 01/19/2023       Review of Systems   Constitutional:  Positive for fatigue.        Not as bad    HENT:  Negative for trouble swallowing.    Respiratory:  Negative for shortness of breath.    Cardiovascular:  Negative for chest pain.   Gastrointestinal:  Negative for nausea and vomiting.        At times burns a little and then also if eats any food will have an urgent need to go to the [facility] to have the BM    Endocrine: Negative for polydipsia, polyphagia and polyuria.   Genitourinary:  Positive for frequency, nocturia and urgency.   Neurological:  Negative for dizziness, seizures, syncope and light-headedness.   Psychiatric/Behavioral:  Negative for self-injury and suicidal ideas.         Objective     /82   Pulse 89   Temp 98 °F (36.7 °C) (Temporal)   Ht 168.9 cm (66.5\")   Wt 71.2 kg (157 lb)   SpO2 97%   BMI 24.96 kg/m²       Physical Exam  Vitals and nursing note reviewed.   Constitutional:       Appearance: Normal appearance. He is normal weight.   HENT:      Head: Normocephalic.      Right Ear: External ear normal.      Left Ear: External ear normal.      Nose: Nose normal.      Mouth/Throat:      Mouth: Mucous membranes are moist.   Eyes:      Pupils: Pupils are equal, round, and reactive to light.   Cardiovascular:      Rate and Rhythm: Normal rate and regular rhythm.      Heart sounds: Normal heart " sounds.   Pulmonary:      Effort: Pulmonary effort is normal.      Breath sounds: Normal breath sounds.   Abdominal:      Palpations: Abdomen is soft.   Musculoskeletal:         General: Normal range of motion.      Cervical back: Normal range of motion and neck supple.   Skin:     General: Skin is warm and dry.   Neurological:      Mental Status: He is alert and oriented to person, place, and time.   Psychiatric:         Mood and Affect: Mood normal.         Behavior: Behavior normal.         Thought Content: Thought content normal.         Judgment: Judgment normal.         Result Review :     The following data was reviewed by: NATALIE Morales on 11/12/2024:      UPPER GI ENDOSCOPY (01/31/2024 15:06)  COLONOSCOPY (01/31/2024 15:07)  Admission (Discharged) with Juno Anderson MD (01/31/2024)      Office Visit with Tolu Link DO (11/04/2024)   Office Visit with Poli Goldstein MD (10/29/2024)   Office Visit with Poli Goldstein MD (09/17/2024)   Office Visit with Aftab Lepe MD (06/17/2024)   IMMUNIZATIONS - SCAN - IMMUNIZATION RECORDS, 01/03/2024 (01/03/2024)       CK (10/02/2024 13:47)  Sedimentation Rate (10/02/2024 13:47)  Comprehensive Metabolic Panel (10/02/2024 13:47)  POC Urinalysis Dipstick, Automated (10/29/2024 13:53)  PSA Screen (06/11/2024 10:37)   Beta-2 Glycoprotein Antibodies (06/11/2024 10:37)  Antithrombin III (06/11/2024 10:37)  Cardiolipin Antibody (06/11/2024 10:37)  Factor 5 Leiden (06/11/2024 10:37)  Lupus Anticoagulant (06/11/2024 10:37)  Protein C Activity (06/11/2024 10:37)  Protein C Antigen, Total (06/11/2024 10:37)   Protein S Functional (06/11/2024 10:37)  Protein S, Total & Free (06/11/2024 10:37)  Prothrombin gene mutation (06/11/2024 10:37)  Urine Culture - Urine, Urine, Clean Catch (06/21/2024 15:47)  Ehrlichia Antibody Panel (09/03/2024 13:52)  Lyme Disease Total Antibody With Reflex to Immunoassay (09/03/2024 13:52)  Alpha-Gal IgE Panel  (09/03/2024 13:52)  Spotted Fever Group AB, IgG/IgM (09/03/2024 13:52)  TSH+Free T4 (09/03/2024 13:52)  Comprehensive Metabolic Panel (09/03/2024 13:52)  CBC & Differential (09/03/2024 13:52)  C-reactive protein (09/03/2024 13:52)  CHA (09/03/2024 13:52)  Rheumatoid Factor (09/03/2024 13:52)  Uric acid (09/03/2024 13:52)  Antistreptolysin O screen (09/03/2024 13:52)  Vitamin B12 & Folate (09/03/2024 13:52)  Vitamin D,25-Hydroxy (09/03/2024 13:52)  Iron Profile (09/03/2024 13:52)  Ferritin (09/03/2024 13:52)  EBV Antibody Profile (09/03/2024 13:52)  Food Allergy Profile (10/02/2024 13:47)  Hemoglobin A1c (10/02/2024 13:47)  Magnesium (10/02/2024 13:47)  Assessment and Plan      Diagnoses and all orders for this visit:    1. Annual physical exam (Primary)    2. Need for COVID-19 vaccine  -     COVID-19 (Pfizer) 12yrs+ (COMIRNATY)    3. Allergic reaction to alpha-gal  Comments:  pt avoiding the beef and pork and dairy products    4. Hiatal hernia  Comments:  continuing the protnix    5. History of total splenectomy  Comments:  due for meningitis B vax in 11/2025; and then the other menatra will be due jan/2029; then the pneumonia va x update 1/2028 due--and flu shot annually    Reviewed all the records with the pt and then all the labs and then all the visits with specialists and then also the pt not needing the immunizations updated--until:    Meningitis B--due: 11/2025   Pneumonia vax--due: 1/2028   Meningitis (Menatra)--due: 1/2029  Then Annual Flu shots          Follow Up     Return if symptoms worsen or fail to improve.    Patient was given instructions and counseling regarding his condition or for health maintenance advice. Please see specific information pulled into the AVS if appropriate.     BMI is within normal parameters. No other follow-up for BMI required.      Yoni DONAL Aleman  reports that he quit smoking about 32 years ago. His smoking use included cigarettes. He started smoking about 53 years ago. He has  a 50 pack-year smoking history. He has been exposed to tobacco smoke. He has never used smokeless tobacco. I have educated him on the risk of diseases from using tobacco products such as cancer, COPD, and heart disease.

## 2024-11-14 DIAGNOSIS — N40.1 BENIGN PROSTATIC HYPERPLASIA WITH LOWER URINARY TRACT SYMPTOMS, SYMPTOM DETAILS UNSPECIFIED: ICD-10-CM

## 2024-11-15 ENCOUNTER — OFFICE VISIT (OUTPATIENT)
Dept: FAMILY MEDICINE CLINIC | Facility: CLINIC | Age: 64
End: 2024-11-15
Payer: COMMERCIAL

## 2024-11-15 VITALS
BODY MASS INDEX: 24.79 KG/M2 | OXYGEN SATURATION: 96 % | DIASTOLIC BLOOD PRESSURE: 104 MMHG | TEMPERATURE: 98.1 F | WEIGHT: 155.9 LBS | SYSTOLIC BLOOD PRESSURE: 158 MMHG | HEART RATE: 72 BPM

## 2024-11-15 DIAGNOSIS — K04.7 DENTAL INFECTION: Primary | ICD-10-CM

## 2024-11-15 PROCEDURE — 1160F RVW MEDS BY RX/DR IN RCRD: CPT | Performed by: FAMILY MEDICINE

## 2024-11-15 PROCEDURE — 1159F MED LIST DOCD IN RCRD: CPT | Performed by: FAMILY MEDICINE

## 2024-11-15 PROCEDURE — 1125F AMNT PAIN NOTED PAIN PRSNT: CPT | Performed by: FAMILY MEDICINE

## 2024-11-15 PROCEDURE — 99213 OFFICE O/P EST LOW 20 MIN: CPT | Performed by: FAMILY MEDICINE

## 2024-11-15 RX ORDER — TAMSULOSIN HYDROCHLORIDE 0.4 MG/1
2 CAPSULE ORAL DAILY
Qty: 60 CAPSULE | Refills: 0 | Status: SHIPPED | OUTPATIENT
Start: 2024-11-15

## 2024-11-15 RX ORDER — ACETAMINOPHEN 500 MG
500 TABLET ORAL EVERY 6 HOURS PRN
Qty: 60 TABLET | Refills: 1 | Status: SHIPPED | OUTPATIENT
Start: 2024-11-15 | End: 2025-01-14

## 2024-11-15 NOTE — PROGRESS NOTES
Chief Complaint  Jaw Pain, Hypertension, and Dental Pain (Possible tooth infection)    Subjective      Yoni Aleman is a 64 y.o. male who presents to [facility]     Jaw and tooth pain. Rotten/cracked tooth on the left upper rear molar that is painful. He can't get into his regular dentist because of his insurance - he's in contact with a dentist to hopefully get the tooth removed next week.  Hurts to chew on that side.  He has to avoid ibuprofen and other NSAIDs due to stomach ulcer history.    Objective   Vital Signs:   Vitals:    11/15/24 1358   BP: (!) 158/104   Pulse: 72   Temp: 98.1 °F (36.7 °C)   SpO2: 96%   Weight: 70.7 kg (155 lb 14.4 oz)   PainSc:   3   PainLoc: Jaw     Body mass index is 24.79 kg/m².    Wt Readings from Last 3 Encounters:   11/15/24 70.7 kg (155 lb 14.4 oz)   11/12/24 71.2 kg (157 lb)   11/04/24 70.9 kg (156 lb 3.2 oz)     BP Readings from Last 3 Encounters:   11/15/24 (!) 158/104   11/12/24 126/82   11/04/24 125/78       Health Maintenance   Topic Date Due    ZOSTER VACCINE (1 of 2) Never done    ANNUAL PHYSICAL  11/12/2025    GASTROSCOPY (EGD)  01/31/2026    COLORECTAL CANCER SCREENING  01/31/2027    TDAP/TD VACCINES (2 - Td or Tdap) 02/05/2027    HEPATITIS C SCREENING  Completed    COVID-19 Vaccine  Completed    INFLUENZA VACCINE  Completed    Pneumococcal Vaccine 0-64  Aged Out       Physical Exam  Vitals and nursing note reviewed.   Constitutional:       General: He is not in acute distress.  HENT:      Mouth/Throat:      Comments: Significant dental decay with many missing teeth.   Cardiovascular:      Rate and Rhythm: Normal rate.   Pulmonary:      Effort: Pulmonary effort is normal. No respiratory distress.   Neurological:      Mental Status: He is alert.   Psychiatric:         Mood and Affect: Mood normal.         Behavior: Behavior normal.          Result Review :  The following data was reviewed by: Rodrick Saldana MD on 11/15/2024:          Procedures          Assessment & Plan  Dental infection  Likely dental infection based on exam and history.  Will treat with antibiotics.  Recommended tylenol for pain control -avoid NSAIDs due to stomach ulcer history.  Avoid anything that could irritate the tooth further such as liquids that are too hot or cold or chewing on that side.  Discussed importance of getting in with a dentist as antibiotics are only a temporary fix and repairing or removing the teeth by a dentist is the way to permanently help this problem.  Discussed red flag symptoms that would warrant ER visit, including worsening signs of infection such as fever or chills, worse swelling, difficulty or painful swallowing, difficulty breathing.    Orders:    amoxicillin-clavulanate (AUGMENTIN) 875-125 MG per tablet; Take 1 tablet by mouth 2 (Two) Times a Day for 7 days.    acetaminophen (TYLENOL) 500 MG tablet; Take 1 tablet by mouth Every 6 (Six) Hours As Needed for Mild Pain for up to 60 days.         BMI is within normal parameters. No other follow-up for BMI required.         FOLLOW UP  Return if symptoms worsen or fail to improve.  Patient was given instructions and counseling regarding his condition or for health maintenance advice. Please see specific information pulled into the AVS if appropriate.       Rodrick Saldana MD  11/15/24  14:14 EST    CURRENT & DISCONTINUED MEDICATIONS  Current Outpatient Medications   Medication Instructions    acetaminophen (TYLENOL) 500 mg, Oral, Every 6 Hours PRN    amoxicillin-clavulanate (AUGMENTIN) 875-125 MG per tablet 1 tablet, Oral, 2 Times Daily    finasteride (PROSCAR) 5 mg, Oral, Daily    omeprazole (PRILOSEC) 40 mg, Oral, Daily    Probiotic Product (Risaquad-2) capsule capsule 1 capsule, Daily    sucralfate (CARAFATE) 1 g, Oral, 3 Times Daily    tamsulosin (FLOMAX) 0.8 mg, Oral, Daily    vitamin B-12 (CYANOCOBALAMIN) 1,000 mcg, Oral, Daily       There are no discontinued medications.

## 2024-11-22 DIAGNOSIS — R29.818 SUSPECTED SLEEP APNEA: Primary | ICD-10-CM

## 2024-11-22 DIAGNOSIS — R06.83 SNORING: ICD-10-CM

## 2024-11-22 DIAGNOSIS — R06.81 WITNESSED EPISODE OF APNEA: ICD-10-CM

## 2024-12-03 ENCOUNTER — HOSPITAL ENCOUNTER (EMERGENCY)
Facility: HOSPITAL | Age: 64
Discharge: HOME | End: 2024-12-03
Attending: EMERGENCY MEDICINE | Admitting: EMERGENCY MEDICINE
Payer: COMMERCIAL

## 2024-12-03 VITALS
SYSTOLIC BLOOD PRESSURE: 135 MMHG | OXYGEN SATURATION: 99 % | HEART RATE: 93 BPM | WEIGHT: 155.87 LBS | HEIGHT: 67 IN | TEMPERATURE: 98.2 F | BODY MASS INDEX: 24.46 KG/M2 | DIASTOLIC BLOOD PRESSURE: 80 MMHG | RESPIRATION RATE: 16 BRPM

## 2024-12-03 DIAGNOSIS — K59.00 CONSTIPATION, UNSPECIFIED CONSTIPATION TYPE: ICD-10-CM

## 2024-12-03 DIAGNOSIS — K29.00 ACUTE GASTRITIS WITHOUT HEMORRHAGE, UNSPECIFIED GASTRITIS TYPE: Primary | ICD-10-CM

## 2024-12-03 LAB
ALBUMIN SERPL-MCNC: 4.8 G/DL (ref 3.5–5.2)
ALBUMIN/GLOB SERPL: 1.6 G/DL
ALP SERPL-CCNC: 67 U/L (ref 39–117)
ALT SERPL W P-5'-P-CCNC: 8 U/L (ref 1–41)
ANION GAP SERPL CALCULATED.3IONS-SCNC: 10.9 MMOL/L (ref 5–15)
AST SERPL-CCNC: 13 U/L (ref 1–40)
BACTERIA UR QL AUTO: ABNORMAL /HPF
BASOPHILS # BLD AUTO: 0.05 10*3/MM3 (ref 0–0.2)
BASOPHILS NFR BLD AUTO: 0.6 % (ref 0–1.5)
BILIRUB SERPL-MCNC: 0.7 MG/DL (ref 0–1.2)
BILIRUB UR QL STRIP: NEGATIVE
BUN SERPL-MCNC: 10 MG/DL (ref 8–23)
BUN/CREAT SERPL: 10.4 (ref 7–25)
CALCIUM SPEC-SCNC: 9.7 MG/DL (ref 8.6–10.5)
CHLORIDE SERPL-SCNC: 100 MMOL/L (ref 98–107)
CLARITY UR: CLEAR
CO2 SERPL-SCNC: 26.1 MMOL/L (ref 22–29)
COLOR UR: YELLOW
CREAT SERPL-MCNC: 0.96 MG/DL (ref 0.76–1.27)
DEPRECATED RDW RBC AUTO: 40.8 FL (ref 37–54)
EGFRCR SERPLBLD CKD-EPI 2021: 88.8 ML/MIN/1.73
EOSINOPHIL # BLD AUTO: 0.11 10*3/MM3 (ref 0–0.4)
EOSINOPHIL NFR BLD AUTO: 1.3 % (ref 0.3–6.2)
ERYTHROCYTE [DISTWIDTH] IN BLOOD BY AUTOMATED COUNT: 13.1 % (ref 12.3–15.4)
GLOBULIN UR ELPH-MCNC: 3 GM/DL
GLUCOSE SERPL-MCNC: 128 MG/DL (ref 65–99)
GLUCOSE UR STRIP-MCNC: NEGATIVE MG/DL
H PYLORI IGG SER IA-ACNC: NEGATIVE
HCT VFR BLD AUTO: 41.6 % (ref 37.5–51)
HGB BLD-MCNC: 14 G/DL (ref 13–17.7)
HGB UR QL STRIP.AUTO: ABNORMAL
HOLD SPECIMEN: NORMAL
HOLD SPECIMEN: NORMAL
HYALINE CASTS UR QL AUTO: ABNORMAL /LPF
IMM GRANULOCYTES # BLD AUTO: 0.02 10*3/MM3 (ref 0–0.05)
IMM GRANULOCYTES NFR BLD AUTO: 0.2 % (ref 0–0.5)
KETONES UR QL STRIP: ABNORMAL
LEUKOCYTE ESTERASE UR QL STRIP.AUTO: NEGATIVE
LIPASE SERPL-CCNC: 15 U/L (ref 13–60)
LYMPHOCYTES # BLD AUTO: 2.81 10*3/MM3 (ref 0.7–3.1)
LYMPHOCYTES NFR BLD AUTO: 32.3 % (ref 19.6–45.3)
MCH RBC QN AUTO: 28.8 PG (ref 26.6–33)
MCHC RBC AUTO-ENTMCNC: 33.7 G/DL (ref 31.5–35.7)
MCV RBC AUTO: 85.6 FL (ref 79–97)
MONOCYTES # BLD AUTO: 0.56 10*3/MM3 (ref 0.1–0.9)
MONOCYTES NFR BLD AUTO: 6.4 % (ref 5–12)
NEUTROPHILS NFR BLD AUTO: 5.15 10*3/MM3 (ref 1.7–7)
NEUTROPHILS NFR BLD AUTO: 59.2 % (ref 42.7–76)
NITRITE UR QL STRIP: NEGATIVE
NRBC BLD AUTO-RTO: 0 /100 WBC (ref 0–0.2)
PH UR STRIP.AUTO: 7 [PH] (ref 5–8)
PLATELET # BLD AUTO: 312 10*3/MM3 (ref 140–450)
PMV BLD AUTO: 11.3 FL (ref 6–12)
POTASSIUM SERPL-SCNC: 3.8 MMOL/L (ref 3.5–5.2)
PROT SERPL-MCNC: 7.8 G/DL (ref 6–8.5)
PROT UR QL STRIP: NEGATIVE
RBC # BLD AUTO: 4.86 10*6/MM3 (ref 4.14–5.8)
RBC # UR STRIP: ABNORMAL /HPF
REF LAB TEST METHOD: ABNORMAL
SODIUM SERPL-SCNC: 137 MMOL/L (ref 136–145)
SP GR UR STRIP: 1.01 (ref 1–1.03)
SQUAMOUS #/AREA URNS HPF: ABNORMAL /HPF
TROPONIN T SERPL HS-MCNC: <6 NG/L
UROBILINOGEN UR QL STRIP: ABNORMAL
WBC # UR STRIP: ABNORMAL /HPF
WBC NRBC COR # BLD AUTO: 8.7 10*3/MM3 (ref 3.4–10.8)
WHOLE BLOOD HOLD COAG: NORMAL
WHOLE BLOOD HOLD SPECIMEN: NORMAL

## 2024-12-03 PROCEDURE — 80053 COMPREHEN METABOLIC PANEL: CPT

## 2024-12-03 PROCEDURE — 83690 ASSAY OF LIPASE: CPT

## 2024-12-03 PROCEDURE — 86606 ASPERGILLUS ANTIBODY: CPT | Performed by: INTERNAL MEDICINE

## 2024-12-03 PROCEDURE — 85025 COMPLETE CBC W/AUTO DIFF WBC: CPT

## 2024-12-03 PROCEDURE — 86003 ALLG SPEC IGE CRUDE XTRC EA: CPT | Performed by: INTERNAL MEDICINE

## 2024-12-03 PROCEDURE — 86677 HELICOBACTER PYLORI ANTIBODY: CPT

## 2024-12-03 PROCEDURE — 93005 ELECTROCARDIOGRAM TRACING: CPT | Performed by: EMERGENCY MEDICINE

## 2024-12-03 PROCEDURE — 36415 COLL VENOUS BLD VENIPUNCTURE: CPT

## 2024-12-03 PROCEDURE — 99283 EMERGENCY DEPT VISIT LOW MDM: CPT

## 2024-12-03 PROCEDURE — 84484 ASSAY OF TROPONIN QUANT: CPT

## 2024-12-03 PROCEDURE — 81001 URINALYSIS AUTO W/SCOPE: CPT

## 2024-12-03 PROCEDURE — 93005 ELECTROCARDIOGRAM TRACING: CPT

## 2024-12-03 RX ORDER — FAMOTIDINE 20 MG/1
20 TABLET, FILM COATED ORAL 2 TIMES DAILY PRN
Qty: 30 TABLET | Refills: 0 | Status: SHIPPED | OUTPATIENT
Start: 2024-12-03 | End: 2024-12-08 | Stop reason: HOSPADM

## 2024-12-03 RX ORDER — POLYETHYLENE GLYCOL 3350 17 G/17G
17 POWDER, FOR SOLUTION ORAL DAILY
Qty: 20 EACH | Refills: 0 | Status: SHIPPED | OUTPATIENT
Start: 2024-12-03

## 2024-12-03 RX ORDER — ALUMINA, MAGNESIA, AND SIMETHICONE 2400; 2400; 240 MG/30ML; MG/30ML; MG/30ML
15 SUSPENSION ORAL ONCE
Status: COMPLETED | OUTPATIENT
Start: 2024-12-03 | End: 2024-12-03

## 2024-12-03 RX ORDER — SODIUM CHLORIDE 0.9 % (FLUSH) 0.9 %
10 SYRINGE (ML) INJECTION AS NEEDED
Status: DISCONTINUED | OUTPATIENT
Start: 2024-12-03 | End: 2024-12-03 | Stop reason: HOSPADM

## 2024-12-03 RX ADMIN — ALUMINUM HYDROXIDE, MAGNESIUM HYDROXIDE, AND DIMETHICONE 15 ML: 400; 400; 40 SUSPENSION ORAL at 20:32

## 2024-12-04 ENCOUNTER — APPOINTMENT (OUTPATIENT)
Dept: CT IMAGING | Facility: HOSPITAL | Age: 64
End: 2024-12-04
Payer: COMMERCIAL

## 2024-12-04 ENCOUNTER — APPOINTMENT (OUTPATIENT)
Dept: GENERAL RADIOLOGY | Facility: HOSPITAL | Age: 64
End: 2024-12-04
Payer: COMMERCIAL

## 2024-12-04 ENCOUNTER — TELEPHONE (OUTPATIENT)
Dept: GASTROENTEROLOGY | Facility: CLINIC | Age: 64
End: 2024-12-04
Payer: COMMERCIAL

## 2024-12-04 ENCOUNTER — HOSPITAL ENCOUNTER (OUTPATIENT)
Facility: HOSPITAL | Age: 64
Discharge: HOME | End: 2024-12-08
Attending: EMERGENCY MEDICINE | Admitting: INTERNAL MEDICINE
Payer: COMMERCIAL

## 2024-12-04 ENCOUNTER — HOSPITAL ENCOUNTER (EMERGENCY)
Facility: HOSPITAL | Age: 64
Discharge: HOME | End: 2024-12-04
Attending: EMERGENCY MEDICINE | Admitting: EMERGENCY MEDICINE
Payer: COMMERCIAL

## 2024-12-04 VITALS
RESPIRATION RATE: 19 BRPM | SYSTOLIC BLOOD PRESSURE: 120 MMHG | WEIGHT: 150.79 LBS | DIASTOLIC BLOOD PRESSURE: 86 MMHG | BODY MASS INDEX: 23.67 KG/M2 | TEMPERATURE: 98 F | HEART RATE: 86 BPM | OXYGEN SATURATION: 98 % | HEIGHT: 67 IN

## 2024-12-04 DIAGNOSIS — K29.00 ACUTE GASTRITIS, PRESENCE OF BLEEDING UNSPECIFIED, UNSPECIFIED GASTRITIS TYPE: ICD-10-CM

## 2024-12-04 DIAGNOSIS — R13.12 DYSPHAGIA, OROPHARYNGEAL: ICD-10-CM

## 2024-12-04 DIAGNOSIS — R10.13 EPIGASTRIC PAIN: ICD-10-CM

## 2024-12-04 DIAGNOSIS — R06.00 DYSPNEA, UNSPECIFIED TYPE: Primary | ICD-10-CM

## 2024-12-04 DIAGNOSIS — R26.2 DIFFICULTY WALKING: ICD-10-CM

## 2024-12-04 DIAGNOSIS — R06.09 DYSPNEA ON EXERTION: Primary | ICD-10-CM

## 2024-12-04 DIAGNOSIS — R10.10 UPPER ABDOMINAL PAIN: ICD-10-CM

## 2024-12-04 PROBLEM — K29.70 GASTRITIS: Status: ACTIVE | Noted: 2024-12-04

## 2024-12-04 LAB
ALBUMIN SERPL-MCNC: 4.6 G/DL (ref 3.5–5.2)
ALBUMIN SERPL-MCNC: 4.8 G/DL (ref 3.5–5.2)
ALBUMIN/GLOB SERPL: 1.6 G/DL
ALBUMIN/GLOB SERPL: 1.6 G/DL
ALP SERPL-CCNC: 62 U/L (ref 39–117)
ALP SERPL-CCNC: 64 U/L (ref 39–117)
ALT SERPL W P-5'-P-CCNC: 8 U/L (ref 1–41)
ALT SERPL W P-5'-P-CCNC: 9 U/L (ref 1–41)
ANION GAP SERPL CALCULATED.3IONS-SCNC: 10.8 MMOL/L (ref 5–15)
ANION GAP SERPL CALCULATED.3IONS-SCNC: 11.7 MMOL/L (ref 5–15)
AST SERPL-CCNC: 12 U/L (ref 1–40)
AST SERPL-CCNC: 12 U/L (ref 1–40)
BASOPHILS # BLD AUTO: 0.04 10*3/MM3 (ref 0–0.2)
BASOPHILS # BLD AUTO: 0.04 10*3/MM3 (ref 0–0.2)
BASOPHILS NFR BLD AUTO: 0.4 % (ref 0–1.5)
BASOPHILS NFR BLD AUTO: 0.5 % (ref 0–1.5)
BILIRUB SERPL-MCNC: 0.7 MG/DL (ref 0–1.2)
BILIRUB SERPL-MCNC: 0.8 MG/DL (ref 0–1.2)
BUN SERPL-MCNC: 13 MG/DL (ref 8–23)
BUN SERPL-MCNC: 13 MG/DL (ref 8–23)
BUN/CREAT SERPL: 14.4 (ref 7–25)
BUN/CREAT SERPL: 14.6 (ref 7–25)
CALCIUM SPEC-SCNC: 9.4 MG/DL (ref 8.6–10.5)
CALCIUM SPEC-SCNC: 9.6 MG/DL (ref 8.6–10.5)
CHLORIDE SERPL-SCNC: 101 MMOL/L (ref 98–107)
CHLORIDE SERPL-SCNC: 102 MMOL/L (ref 98–107)
CO2 SERPL-SCNC: 23.3 MMOL/L (ref 22–29)
CO2 SERPL-SCNC: 24.2 MMOL/L (ref 22–29)
CREAT SERPL-MCNC: 0.89 MG/DL (ref 0.76–1.27)
CREAT SERPL-MCNC: 0.9 MG/DL (ref 0.76–1.27)
D DIMER PPP FEU-MCNC: <0.27 MCGFEU/ML (ref 0–0.63)
DEPRECATED RDW RBC AUTO: 41 FL (ref 37–54)
DEPRECATED RDW RBC AUTO: 41.5 FL (ref 37–54)
EGFRCR SERPLBLD CKD-EPI 2021: 96 ML/MIN/1.73
EGFRCR SERPLBLD CKD-EPI 2021: 96.3 ML/MIN/1.73
EOSINOPHIL # BLD AUTO: 0.22 10*3/MM3 (ref 0–0.4)
EOSINOPHIL # BLD AUTO: 0.23 10*3/MM3 (ref 0–0.4)
EOSINOPHIL NFR BLD AUTO: 2.3 % (ref 0.3–6.2)
EOSINOPHIL NFR BLD AUTO: 2.9 % (ref 0.3–6.2)
ERYTHROCYTE [DISTWIDTH] IN BLOOD BY AUTOMATED COUNT: 13.1 % (ref 12.3–15.4)
ERYTHROCYTE [DISTWIDTH] IN BLOOD BY AUTOMATED COUNT: 13.2 % (ref 12.3–15.4)
FLUAV SUBTYP SPEC NAA+PROBE: NOT DETECTED
FLUBV RNA ISLT QL NAA+PROBE: NOT DETECTED
GLOBULIN UR ELPH-MCNC: 2.8 GM/DL
GLOBULIN UR ELPH-MCNC: 3 GM/DL
GLUCOSE SERPL-MCNC: 104 MG/DL (ref 65–99)
GLUCOSE SERPL-MCNC: 110 MG/DL (ref 65–99)
HCT VFR BLD AUTO: 40.9 % (ref 37.5–51)
HCT VFR BLD AUTO: 41.5 % (ref 37.5–51)
HGB BLD-MCNC: 13.5 G/DL (ref 13–17.7)
HGB BLD-MCNC: 13.9 G/DL (ref 13–17.7)
HOLD SPECIMEN: NORMAL
HOLD SPECIMEN: NORMAL
IMM GRANULOCYTES # BLD AUTO: 0.01 10*3/MM3 (ref 0–0.05)
IMM GRANULOCYTES # BLD AUTO: 0.02 10*3/MM3 (ref 0–0.05)
IMM GRANULOCYTES NFR BLD AUTO: 0.1 % (ref 0–0.5)
IMM GRANULOCYTES NFR BLD AUTO: 0.2 % (ref 0–0.5)
LYMPHOCYTES # BLD AUTO: 2.2 10*3/MM3 (ref 0.7–3.1)
LYMPHOCYTES # BLD AUTO: 2.86 10*3/MM3 (ref 0.7–3.1)
LYMPHOCYTES NFR BLD AUTO: 27.7 % (ref 19.6–45.3)
LYMPHOCYTES NFR BLD AUTO: 29.9 % (ref 19.6–45.3)
MAGNESIUM SERPL-MCNC: 2.3 MG/DL (ref 1.6–2.4)
MCH RBC QN AUTO: 28.5 PG (ref 26.6–33)
MCH RBC QN AUTO: 28.8 PG (ref 26.6–33)
MCHC RBC AUTO-ENTMCNC: 33 G/DL (ref 31.5–35.7)
MCHC RBC AUTO-ENTMCNC: 33.5 G/DL (ref 31.5–35.7)
MCV RBC AUTO: 86.1 FL (ref 79–97)
MCV RBC AUTO: 86.3 FL (ref 79–97)
MONOCYTES # BLD AUTO: 0.45 10*3/MM3 (ref 0.1–0.9)
MONOCYTES # BLD AUTO: 0.58 10*3/MM3 (ref 0.1–0.9)
MONOCYTES NFR BLD AUTO: 5.7 % (ref 5–12)
MONOCYTES NFR BLD AUTO: 6.1 % (ref 5–12)
NEUTROPHILS NFR BLD AUTO: 5 10*3/MM3 (ref 1.7–7)
NEUTROPHILS NFR BLD AUTO: 5.85 10*3/MM3 (ref 1.7–7)
NEUTROPHILS NFR BLD AUTO: 61.1 % (ref 42.7–76)
NEUTROPHILS NFR BLD AUTO: 63.1 % (ref 42.7–76)
NRBC BLD AUTO-RTO: 0 /100 WBC (ref 0–0.2)
NRBC BLD AUTO-RTO: 0 /100 WBC (ref 0–0.2)
NT-PROBNP SERPL-MCNC: <36 PG/ML (ref 0–900)
PHOSPHATE SERPL-MCNC: 2.7 MG/DL (ref 2.5–4.5)
PLATELET # BLD AUTO: 321 10*3/MM3 (ref 140–450)
PLATELET # BLD AUTO: 341 10*3/MM3 (ref 140–450)
PMV BLD AUTO: 10.7 FL (ref 6–12)
PMV BLD AUTO: 10.7 FL (ref 6–12)
POTASSIUM SERPL-SCNC: 4.2 MMOL/L (ref 3.5–5.2)
POTASSIUM SERPL-SCNC: 4.6 MMOL/L (ref 3.5–5.2)
PROT SERPL-MCNC: 7.4 G/DL (ref 6–8.5)
PROT SERPL-MCNC: 7.8 G/DL (ref 6–8.5)
QT INTERVAL: 359 MS
QTC INTERVAL: 404 MS
RBC # BLD AUTO: 4.74 10*6/MM3 (ref 4.14–5.8)
RBC # BLD AUTO: 4.82 10*6/MM3 (ref 4.14–5.8)
RSV RNA NPH QL NAA+NON-PROBE: NOT DETECTED
SARS-COV-2 RNA RESP QL NAA+PROBE: NOT DETECTED
SODIUM SERPL-SCNC: 136 MMOL/L (ref 136–145)
SODIUM SERPL-SCNC: 137 MMOL/L (ref 136–145)
TROPONIN T SERPL HS-MCNC: 11 NG/L
TROPONIN T SERPL HS-MCNC: <6 NG/L
WBC NRBC COR # BLD AUTO: 7.93 10*3/MM3 (ref 3.4–10.8)
WBC NRBC COR # BLD AUTO: 9.57 10*3/MM3 (ref 3.4–10.8)
WHOLE BLOOD HOLD COAG: NORMAL
WHOLE BLOOD HOLD SPECIMEN: NORMAL

## 2024-12-04 PROCEDURE — 99285 EMERGENCY DEPT VISIT HI MDM: CPT

## 2024-12-04 PROCEDURE — 93010 ELECTROCARDIOGRAM REPORT: CPT | Performed by: INTERNAL MEDICINE

## 2024-12-04 PROCEDURE — 71275 CT ANGIOGRAPHY CHEST: CPT

## 2024-12-04 PROCEDURE — 83735 ASSAY OF MAGNESIUM: CPT | Performed by: HOSPITALIST

## 2024-12-04 PROCEDURE — 87449 NOS EACH ORGANISM AG IA: CPT | Performed by: INTERNAL MEDICINE

## 2024-12-04 PROCEDURE — 83880 ASSAY OF NATRIURETIC PEPTIDE: CPT | Performed by: EMERGENCY MEDICINE

## 2024-12-04 PROCEDURE — 93005 ELECTROCARDIOGRAM TRACING: CPT | Performed by: EMERGENCY MEDICINE

## 2024-12-04 PROCEDURE — 93005 ELECTROCARDIOGRAM TRACING: CPT

## 2024-12-04 PROCEDURE — 25510000001 IOPAMIDOL PER 1 ML: Performed by: EMERGENCY MEDICINE

## 2024-12-04 PROCEDURE — 85025 COMPLETE CBC W/AUTO DIFF WBC: CPT | Performed by: EMERGENCY MEDICINE

## 2024-12-04 PROCEDURE — 85379 FIBRIN DEGRADATION QUANT: CPT | Performed by: EMERGENCY MEDICINE

## 2024-12-04 PROCEDURE — G0378 HOSPITAL OBSERVATION PER HR: HCPCS

## 2024-12-04 PROCEDURE — 74177 CT ABD & PELVIS W/CONTRAST: CPT

## 2024-12-04 PROCEDURE — 99284 EMERGENCY DEPT VISIT MOD MDM: CPT | Performed by: INTERNAL MEDICINE

## 2024-12-04 PROCEDURE — 80053 COMPREHEN METABOLIC PANEL: CPT | Performed by: EMERGENCY MEDICINE

## 2024-12-04 PROCEDURE — 71045 X-RAY EXAM CHEST 1 VIEW: CPT

## 2024-12-04 PROCEDURE — 96374 THER/PROPH/DIAG INJ IV PUSH: CPT

## 2024-12-04 PROCEDURE — 84484 ASSAY OF TROPONIN QUANT: CPT | Performed by: EMERGENCY MEDICINE

## 2024-12-04 PROCEDURE — 85025 COMPLETE CBC W/AUTO DIFF WBC: CPT

## 2024-12-04 PROCEDURE — 84100 ASSAY OF PHOSPHORUS: CPT | Performed by: HOSPITALIST

## 2024-12-04 PROCEDURE — 87637 SARSCOV2&INF A&B&RSV AMP PRB: CPT | Performed by: NURSE PRACTITIONER

## 2024-12-04 PROCEDURE — 99284 EMERGENCY DEPT VISIT MOD MDM: CPT

## 2024-12-04 PROCEDURE — 96376 TX/PRO/DX INJ SAME DRUG ADON: CPT

## 2024-12-04 PROCEDURE — 99223 1ST HOSP IP/OBS HIGH 75: CPT | Performed by: HOSPITALIST

## 2024-12-04 RX ORDER — BISACODYL 5 MG/1
5 TABLET, DELAYED RELEASE ORAL DAILY PRN
Status: DISCONTINUED | OUTPATIENT
Start: 2024-12-04 | End: 2024-12-09 | Stop reason: HOSPADM

## 2024-12-04 RX ORDER — METHOCARBAMOL 500 MG/1
500 TABLET, FILM COATED ORAL EVERY 8 HOURS PRN
Status: DISCONTINUED | OUTPATIENT
Start: 2024-12-04 | End: 2024-12-09 | Stop reason: HOSPADM

## 2024-12-04 RX ORDER — TAMSULOSIN HYDROCHLORIDE 0.4 MG/1
0.4 CAPSULE ORAL DAILY
Status: DISCONTINUED | OUTPATIENT
Start: 2024-12-04 | End: 2024-12-09 | Stop reason: HOSPADM

## 2024-12-04 RX ORDER — LIDOCAINE HYDROCHLORIDE 20 MG/ML
10 SOLUTION OROPHARYNGEAL ONCE
Status: COMPLETED | OUTPATIENT
Start: 2024-12-04 | End: 2024-12-04

## 2024-12-04 RX ORDER — PANTOPRAZOLE SODIUM 40 MG/10ML
80 INJECTION, POWDER, LYOPHILIZED, FOR SOLUTION INTRAVENOUS ONCE
Status: COMPLETED | OUTPATIENT
Start: 2024-12-04 | End: 2024-12-04

## 2024-12-04 RX ORDER — ACETAMINOPHEN 325 MG/1
650 TABLET ORAL EVERY 4 HOURS PRN
Status: DISCONTINUED | OUTPATIENT
Start: 2024-12-04 | End: 2024-12-09 | Stop reason: HOSPADM

## 2024-12-04 RX ORDER — ALUMINA, MAGNESIA, AND SIMETHICONE 2400; 2400; 240 MG/30ML; MG/30ML; MG/30ML
15 SUSPENSION ORAL ONCE
Status: COMPLETED | OUTPATIENT
Start: 2024-12-04 | End: 2024-12-04

## 2024-12-04 RX ORDER — AMOXICILLIN 250 MG
2 CAPSULE ORAL 2 TIMES DAILY
Status: DISCONTINUED | OUTPATIENT
Start: 2024-12-04 | End: 2024-12-09 | Stop reason: HOSPADM

## 2024-12-04 RX ORDER — PANTOPRAZOLE SODIUM 40 MG/10ML
40 INJECTION, POWDER, LYOPHILIZED, FOR SOLUTION INTRAVENOUS
Status: DISCONTINUED | OUTPATIENT
Start: 2024-12-04 | End: 2024-12-06

## 2024-12-04 RX ORDER — LIDOCAINE 4 G/G
1 PATCH TOPICAL
Status: DISCONTINUED | OUTPATIENT
Start: 2024-12-04 | End: 2024-12-09 | Stop reason: HOSPADM

## 2024-12-04 RX ORDER — ONDANSETRON 2 MG/ML
4 INJECTION INTRAMUSCULAR; INTRAVENOUS EVERY 6 HOURS PRN
Status: DISCONTINUED | OUTPATIENT
Start: 2024-12-04 | End: 2024-12-09 | Stop reason: HOSPADM

## 2024-12-04 RX ORDER — POLYETHYLENE GLYCOL 3350 17 G/17G
17 POWDER, FOR SOLUTION ORAL DAILY PRN
Status: DISCONTINUED | OUTPATIENT
Start: 2024-12-04 | End: 2024-12-09 | Stop reason: HOSPADM

## 2024-12-04 RX ORDER — BISACODYL 10 MG
10 SUPPOSITORY, RECTAL RECTAL DAILY PRN
Status: DISCONTINUED | OUTPATIENT
Start: 2024-12-04 | End: 2024-12-09 | Stop reason: HOSPADM

## 2024-12-04 RX ORDER — SODIUM CHLORIDE 0.9 % (FLUSH) 0.9 %
10 SYRINGE (ML) INJECTION EVERY 12 HOURS SCHEDULED
Status: DISCONTINUED | OUTPATIENT
Start: 2024-12-04 | End: 2024-12-09 | Stop reason: HOSPADM

## 2024-12-04 RX ORDER — ALUMINA, MAGNESIA, AND SIMETHICONE 2400; 2400; 240 MG/30ML; MG/30ML; MG/30ML
15 SUSPENSION ORAL EVERY 6 HOURS PRN
Status: DISCONTINUED | OUTPATIENT
Start: 2024-12-04 | End: 2024-12-09 | Stop reason: HOSPADM

## 2024-12-04 RX ORDER — UREA 10 %
1000 LOTION (ML) TOPICAL DAILY
Status: DISCONTINUED | OUTPATIENT
Start: 2024-12-04 | End: 2024-12-09 | Stop reason: HOSPADM

## 2024-12-04 RX ORDER — ACETAMINOPHEN 160 MG/5ML
650 SOLUTION ORAL EVERY 4 HOURS PRN
Status: DISCONTINUED | OUTPATIENT
Start: 2024-12-04 | End: 2024-12-09 | Stop reason: HOSPADM

## 2024-12-04 RX ORDER — FINASTERIDE 5 MG/1
5 TABLET, FILM COATED ORAL DAILY
Status: DISCONTINUED | OUTPATIENT
Start: 2024-12-04 | End: 2024-12-09 | Stop reason: HOSPADM

## 2024-12-04 RX ORDER — ACETAMINOPHEN 650 MG/1
650 SUPPOSITORY RECTAL EVERY 4 HOURS PRN
Status: DISCONTINUED | OUTPATIENT
Start: 2024-12-04 | End: 2024-12-09 | Stop reason: HOSPADM

## 2024-12-04 RX ORDER — IOPAMIDOL 755 MG/ML
100 INJECTION, SOLUTION INTRAVASCULAR
Status: COMPLETED | OUTPATIENT
Start: 2024-12-04 | End: 2024-12-04

## 2024-12-04 RX ORDER — SODIUM CHLORIDE 9 MG/ML
40 INJECTION, SOLUTION INTRAVENOUS AS NEEDED
Status: DISCONTINUED | OUTPATIENT
Start: 2024-12-04 | End: 2024-12-09 | Stop reason: HOSPADM

## 2024-12-04 RX ORDER — ACETAMINOPHEN 500 MG
500 TABLET ORAL EVERY 6 HOURS PRN
Status: DISCONTINUED | OUTPATIENT
Start: 2024-12-04 | End: 2024-12-09 | Stop reason: HOSPADM

## 2024-12-04 RX ORDER — MULTIPLE VITAMINS W/ MINERALS TAB 9MG-400MCG
1 TAB ORAL DAILY
Status: DISCONTINUED | OUTPATIENT
Start: 2024-12-04 | End: 2024-12-09 | Stop reason: HOSPADM

## 2024-12-04 RX ORDER — ONDANSETRON 4 MG/1
4 TABLET, ORALLY DISINTEGRATING ORAL EVERY 6 HOURS PRN
Status: DISCONTINUED | OUTPATIENT
Start: 2024-12-04 | End: 2024-12-09 | Stop reason: HOSPADM

## 2024-12-04 RX ORDER — SODIUM CHLORIDE 0.9 % (FLUSH) 0.9 %
10 SYRINGE (ML) INJECTION AS NEEDED
Status: DISCONTINUED | OUTPATIENT
Start: 2024-12-04 | End: 2024-12-09 | Stop reason: HOSPADM

## 2024-12-04 RX ORDER — SODIUM CHLORIDE 0.9 % (FLUSH) 0.9 %
10 SYRINGE (ML) INJECTION AS NEEDED
Status: DISCONTINUED | OUTPATIENT
Start: 2024-12-04 | End: 2024-12-04 | Stop reason: HOSPADM

## 2024-12-04 RX ADMIN — PANTOPRAZOLE SODIUM 40 MG: 40 INJECTION, POWDER, FOR SOLUTION INTRAVENOUS at 22:00

## 2024-12-04 RX ADMIN — ALUMINUM HYDROXIDE, MAGNESIUM HYDROXIDE, AND DIMETHICONE 15 ML: 400; 400; 40 SUSPENSION ORAL at 14:14

## 2024-12-04 RX ADMIN — LIDOCAINE HYDROCHLORIDE 10 ML: 20 SOLUTION ORAL; TOPICAL at 14:15

## 2024-12-04 RX ADMIN — SENNOSIDES AND DOCUSATE SODIUM 2 TABLET: 50; 8.6 TABLET ORAL at 22:01

## 2024-12-04 RX ADMIN — PANTOPRAZOLE SODIUM 80 MG: 40 INJECTION, POWDER, FOR SOLUTION INTRAVENOUS at 14:15

## 2024-12-04 RX ADMIN — Medication 10 ML: at 22:02

## 2024-12-04 RX ADMIN — CYANOCOBALAMIN TAB 500 MCG 1000 MCG: 500 TAB at 22:00

## 2024-12-04 RX ADMIN — FINASTERIDE 5 MG: 5 TABLET, FILM COATED ORAL at 22:01

## 2024-12-04 RX ADMIN — TAMSULOSIN HYDROCHLORIDE 0.4 MG: 0.4 CAPSULE ORAL at 22:01

## 2024-12-04 RX ADMIN — LIDOCAINE 1 PATCH: 4 PATCH TOPICAL at 22:01

## 2024-12-04 RX ADMIN — Medication 1 TABLET: at 22:00

## 2024-12-04 RX ADMIN — IOPAMIDOL 100 ML: 755 INJECTION, SOLUTION INTRAVENOUS at 12:43

## 2024-12-04 NOTE — ED PROVIDER NOTES
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 infaction, pulmonary embolism, cardiac tamponade, pericarditis, pneumothorax, musculoskeletal chest pain and other differential diagnosis as an etiology of the patient's chest pain.     All labs were reviewed and interpreted by me.  EKG was interpreted by me.  CT scan radiology impression was interpreted by me.    MDM     Patient is a 64-year-old male who presents with complaints of upper abdominal pain as well as some chest pain and shortness of breath.  Very atypical in nature.  Appears to be more gastritis/reflux.  Spoke with Dr. Alarcon who will scope in the morning.  Given Protonix here.  CT does not really show any other acute findings.  He has been seen and evaluated 3 times in last 24 hours for similar.  Will admit to the hospital further workup management.                Patient Care Considerations:          Consultants/Shared Management Plan:    Hospitalist: I have discussed the case with Dr. Willard who agrees to accept the patient for admission.  Consultant: I have discussed the case with Dr. Anderson who agrees to consult on the patient.    Social Determinants of Health:          Disposition and Care Coordination:    Admit:   Through independent evaluation of the patient's history, physical, and imperical data, the  patient meets criteria for inpatient admission to the hospital.        Final diagnoses:   Upper abdominal pain   Acute gastritis, presence of bleeding unspecified, unspecified gastritis type        ED Disposition       ED Disposition   Decision to Admit    Condition   --    Comment   --               This medical record created using voice recognition software.             Rigoberto Solis MD  12/04/24 0115

## 2024-12-04 NOTE — DISCHARGE INSTRUCTIONS
Your lab work, EKG, chest x-ray, and exam do not suggest any acute cardiac events at this time.  Your exam and symptoms are consistent with gastritis. Call Dr. Anderson's clinic for a follow up. Continue taking your omeprazole. Take pepcid as needed.    You are also being discharged with miralax to help with your constipation.    Follow-up with your PCP in 3 to 5 days.    Return to the Emergency Department if you develop any uncontrollable fever, intractable pain, nausea, vomiting.

## 2024-12-04 NOTE — Clinical Note
Level of Care: Med/Surg [1]   Diagnosis: Gastritis [786102]   Admitting Physician: LUCIO BARNES [[id number]]   Attending Physician: LUCIO BARNES [[id number]]   Certification: I Certify That Inpatient Hospital Services Are Medically Necessary For Greater Than 2 Midnights

## 2024-12-04 NOTE — DISCHARGE INSTRUCTIONS
Please return to the emergency room for chest pain, chest pressure, near passing out, passing out, unusual sweating, unusual fatigue, worsening shortness of breath or any new symptoms you are concerned with    Please discuss the possible need for pulmonary function test with your primary care physician

## 2024-12-04 NOTE — ED NOTES
Provider deemed patient stable for discharge. AVS reviewed with patient including medications and follow up care. Pt voices understanding of instructions, with no further questions. Pt ambulatory at [place].

## 2024-12-04 NOTE — H&P
[facility]  Date: [facility]   Patient Name: Yoni Aleman  : 1960  MRN: [id number]  Primary Care Physician:  Valeria Avendano APRN  Date of admission: 2024    Subjective abdominal pain/chest pain  Subjective   Chief Complaint: Abdominal pain/chest pain    HPI: Patient is a 64-year-old male with a past medical history of acid reflux, gastric ulcer, insomnia, anxiety, sciatic nerve pain who presents to the emergency room with abdominal chest discomfort for 2 weeks which is progressively gotten worse.  Patient states that at times, pills get stuck in his throat and his wife has to do the Heimlich maneuver.  Patient is currently taking a PPI and Carafate without relief.    He also reports concern with his bowel movements.  He feels that if he eats anything he has rapid defecation.  He reports frequent diarrhea.    Patient is also concerned about a hiatal hernia.  In the past, it is been diagnosed as being medium sized.  Additionally he has esophageal mucosal changes consistent with long segment Mesa's esophagus.    On arrival to the ED, patient's temperature 97.9, pulse of 76, respiratory rate of 19, blood pressure 123/87, and patient is 95% on room air.    Patient sodium is 136, potassium is 4.6, chloride is 101, CO2 is 24.2, glucose is 104, calcium is 9.4.  White blood cell count is 9.57, hemoglobin is 13.5.    CT of the abdomen shows: Gastritis.  PE protocol shows no pulmonary embolism.  Personal History     Past Medical History:  Past Medical History:   Diagnosis Date    Acid reflux     Anxiety     Deep vein blood clot of left lower extremity     Gastric ulcer     Insomnia     Sciatic nerve pain     Scoliosis        Past Surgical History:  Past Surgical History:   Procedure Laterality Date    COLONOSCOPY N/A 2024    Procedure: COLONOSCOPY WITH HOT SNARE POLYPECTOMY;  Surgeon: Juno Anderson MD;  Location: [facility] ENDOSCOPY;  Service:  Gastroenterology;  Laterality: N/A;  COLON POLYP    ENDOSCOPY N/A 2024    Procedure: ESOPHAGOGASTRODUODENOSCOPY WITH BIOPSIES;  Surgeon: Juno Anderson MD;  Location: [facility] ENDOSCOPY;  Service: Gastroenterology;  Laterality: N/A;  HIATAL HERNIA, BARRETTS ESOPHAGUS    SPLENECTOMY      UPPER GASTROINTESTINAL ENDOSCOPY         Family History:   Family History   Problem Relation Age of Onset    Mental illness Mother     Hypertension Mother     Alzheimer's disease Mother     Insomnia Father     Colon cancer Neg Hx        Social History:   Social History     Socioeconomic History    Marital status: Single   Tobacco Use    Smoking status: Former     Current packs/day: 0.00     Average packs/day: 2.0 packs/day for 25.0 years (50.0 ttl pk-yrs)     Types: Cigarettes     Start date:      Quit date:      Years since quittin.9     Passive exposure: Past    Smokeless tobacco: Never   Vaping Use    Vaping status: Never Used   Substance and Sexual Activity    Alcohol use: Not Currently    Drug use: Not Currently    Sexual activity: Yes     Partners: Female       Home Medications:  Risaquad-2, acetaminophen, famotidine, finasteride, omeprazole, polyethylene glycol, sucralfate, tamsulosin, and vitamin B-12    Allergies:  No Known Allergies    Review of Systems   All systems were reviewed and negative except for: abdominal pain and chest pain     Objective   Objective     Vitals:   Temp:  [97.9 °F (36.6 °C)-98 °F (36.7 °C)] 97.9 °F (36.6 °C)  Heart Rate:  [72-93] 93  Resp:  [16-20] 18  BP: (120-142)/(77-98) 120/79    Physical Exam    Constitutional: Awake, alert, no acute distress   Eyes: Pupils equal, sclerae anicteric, no conjunctival injection   HENT: NCAT, mucous membranes moist   Neck: Supple, no thyromegaly, no lymphadenopathy, trachea midline   Respiratory: Clear to auscultation bilaterally, nonlabored respirations    Cardiovascular: RRR, no murmurs, rubs, or gallops, palpable pedal pulses  bilaterally   Gastrointestinal: Positive bowel sounds, soft, nontender, nondistended   Musculoskeletal: No bilateral ankle edema, no clubbing or cyanosis to extremities   Psychiatric: Appropriate affect, cooperative   Neurologic: Oriented x 3, strength symmetric in all extremities, Cranial Nerves grossly intact to confrontation, speech clear   Skin: No rashes     Result Review    Result Review:  I have personally reviewed the results from the time of this admission to 12/4/2024 16:23 EST and agree with these findings:  [x]  Laboratory  [x]  Microbiology  [x]  Radiology  [x]  EKG/Telemetry   [x]  Cardiology/Vascular   [x]  Pathology  [x]  Old records  []  Other:      Assessment & Plan   Assessment / Plan   #1 gastritis  -Protonix IV  -Clear liquid diet  -Plans for EGD    #2 Hiatal hernia  -will ultimately need hiatal hernia surgery.  Patient very insistent he wants it set up while in the hospital.      #3 Concerned that he stops breathing at night  -will check nocturnal oxygen.  Will need to do official sleep study.     #4 Patient describes and is concerned about abnormal bowel habits-encouraged the patient to discuss the issue with GI.     #5 BPH-continue flomax and proscar.     VTE Prophylaxis:  Mechanical VTE prophylaxis orders are signed & held.          CODE STATUS:    Level Of Support Discussed With: Patient  Code Status (Patient has no pulse and is not breathing): CPR (Attempt to Resuscitate)  Medical Interventions (Patient has pulse or is breathing): Full Support      Admission Status:  I believe this patient meets inpatient status.    Electronically signed by Renny Willard DO, 12/04/24, 4:23 PM EST.

## 2024-12-04 NOTE — TELEPHONE ENCOUNTER
Received call from patients significant other stating he was seen in ER twice yesterday and needed to see Dr Anderson today. Advised Patient Dr Anderson is only in office on Tuesday afternoon and we don't have any available appointments today. Advised if patient is still experiencing issues and feels he needs to be seen today he can return to ER and if ER Provider feels it necessary to consult a GI they will contact the on call GI Provider (Dr Anderson). Patient voiced understanding.
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
## 2024-12-04 NOTE — ED PROVIDER NOTES
Time: 8:10 PM EST  Date of encounter:  12/3/2024  Independent Historian/Clinical History and Information was obtained by:   Patient    History is limited by: N/A    Chief Complaint: chest pain      History of Present Illness:  Patient is a 64 y.o. year old male who presents to the emergency department for evaluation of chest pain and epigastric pain.  Symptoms have been going on for the past week.  This is worse after he eats.  He does have a history of gastric ulcer.  States that has been taking his medication with minimal relief.  He has not had any follow-ups with his gastroenterologist.  He had an EGD and colonoscopy done in January 2024 with Dr. Anderson which showed a hiatal hernia.  His symptoms does get better with acid reducer medications.  On evaluation, he states that his symptoms have resolved.      Patient Care Team  Primary Care Provider: Valeria Avendano APRN    Past Medical History:     No Known Allergies  Past Medical History:   Diagnosis Date    Acid reflux     Anxiety     Deep vein blood clot of left lower extremity     Gastric ulcer     Insomnia     Sciatic nerve pain     Scoliosis      Past Surgical History:   Procedure Laterality Date    COLONOSCOPY N/A 1/31/2024    Procedure: COLONOSCOPY WITH HOT SNARE POLYPECTOMY;  Surgeon: Juno Anderson MD;  Location: [facility] ENDOSCOPY;  Service: Gastroenterology;  Laterality: N/A;  COLON POLYP    ENDOSCOPY N/A 1/31/2024    Procedure: ESOPHAGOGASTRODUODENOSCOPY WITH BIOPSIES;  Surgeon: Juno Anderson MD;  Location: [facility] ENDOSCOPY;  Service: Gastroenterology;  Laterality: N/A;  HIATAL HERNIA, BARRETTS ESOPHAGUS    SPLENECTOMY      UPPER GASTROINTESTINAL ENDOSCOPY       Family History   Problem Relation Age of Onset    Mental illness Mother     Hypertension Mother     Alzheimer's disease Mother     Insomnia Father     Colon cancer Neg Hx        Home Medications:  Prior to Admission medications    Medication Sig Start Date End Date Taking?  Authorizing Provider   acetaminophen (TYLENOL) 500 MG tablet Take 1 tablet by mouth Every 6 (Six) Hours As Needed for Mild Pain for up to 60 days. 11/15/24 1/14/25  Rodrick Saldana MD   finasteride (PROSCAR) 5 MG tablet TAKE 1 TABLET BY MOUTH EVERY DAY 24   Patricia Garcia APRN   omeprazole (priLOSEC) 40 MG capsule TAKE 1 CAPSULE BY MOUTH DAILY 3/12/24   Naima Kelly APRN   Probiotic Product (Risaquad-2) capsule capsule Take 1 capsule by mouth Daily.    Provider, MD Santi   sucralfate (CARAFATE) 1 g tablet TAKE 1 TABLET BY MOUTH THREE TIMES DAILY 10/28/24   Naima Kelly APRN   tamsulosin (FLOMAX) 0.4 MG capsule 24 hr capsule TAKE 2 CAPSULES BY MOUTH EVERY DAY 11/15/24   Jeannette Rowell APRN   vitamin B-12 (CYANOCOBALAMIN) 1000 MCG tablet Take 1 tablet by mouth Daily. 10/2/24   Meron Hardwick APRN        Social History:   Social History     Tobacco Use    Smoking status: Former     Current packs/day: 0.00     Average packs/day: 2.0 packs/day for 25.0 years (50.0 ttl pk-yrs)     Types: Cigarettes     Start date:      Quit date:      Years since quittin.9     Passive exposure: Past    Smokeless tobacco: Never   Vaping Use    Vaping status: Never Used   Substance Use Topics    Alcohol use: Not Currently    Drug use: Not Currently         Review of Systems:  Review of Systems   Constitutional:  Negative for chills and fever.   HENT:  Negative for congestion and ear pain.    Eyes:  Negative for pain.   Respiratory:  Negative for cough and shortness of breath.    Cardiovascular:  Positive for chest pain.   Gastrointestinal:  Positive for abdominal pain. Negative for diarrhea, nausea and vomiting.   Genitourinary:  Negative for dysuria and hematuria.   Musculoskeletal:  Negative for myalgias.   Skin:  Negative for rash.   Neurological:  Negative for dizziness and headaches.        Physical Exam:  /86 (BP Location: Right arm, Patient Position: Sitting)   Pulse 93   " Temp 98.2 °F (36.8 °C) (Oral)   Resp 18   Ht 168.9 cm (66.5\")   Wt 70.7 kg (155 lb 13.8 oz)   SpO2 99%   BMI 24.78 kg/m²     Physical Exam  Vitals and nursing note reviewed.   Constitutional:       Appearance: Normal appearance. He is normal weight.   HENT:      Head: Normocephalic and atraumatic.      Nose: Nose normal.   Eyes:      Conjunctiva/sclera: Conjunctivae normal.      Pupils: Pupils are equal, round, and reactive to light.   Cardiovascular:      Rate and Rhythm: Normal rate and regular rhythm.      Pulses: Normal pulses.      Heart sounds: Normal heart sounds.   Pulmonary:      Effort: Pulmonary effort is normal.      Breath sounds: Normal breath sounds.   Abdominal:      General: Abdomen is flat. Bowel sounds are normal.      Palpations: Abdomen is soft.   Musculoskeletal:         General: Normal range of motion.      Cervical back: Normal range of motion and neck supple.   Skin:     General: Skin is warm and dry.   Neurological:      General: No focal deficit present.      Mental Status: He is alert and oriented to person, place, and time.   Psychiatric:         Mood and Affect: Mood normal.         Behavior: Behavior normal.                  Procedures:  Procedures      Medical Decision Making:      Comorbidities that affect care:    Gastric ulcer, hiatal hernia    External Notes reviewed:    Previous Operation Note: Reviewed EGD by Dr. Anderson in January 2024.      The following orders were placed and all results were independently analyzed by me:  Orders Placed This Encounter   Procedures    [place] Draw    Comprehensive Metabolic Panel    Lipase    Single High Sensitivity Troponin T    Urinalysis With Microscopic If Indicated (No Culture) - Urine, Clean Catch    CBC Auto Differential    Urinalysis, Microscopic Only - Urine, Clean Catch    H. Pylori Antibody, IgG (VERNA, COR)    NPO Diet NPO Type: Strict NPO    Undress & Gown    Continuous Pulse Oximetry    Vital Signs    Oxygen Therapy- Nasal " Cannula; Titrate 1-6 LPM Per SpO2; 90 - 95%    ECG 12 Lead ED Triage Standing Order; Abdominal Pain (Upper)    Insert Peripheral IV    CBC & Differential    Green Top (Gel)    Lavender Top    Gold Top - SST    Light Blue Top       Medications Given in the Emergency Department:  Medications   sodium chloride 0.9 % flush 10 mL (has no administration in time range)   aluminum-magnesium hydroxide-simethicone (MAALOX MAX) 400-400-40 MG/5ML suspension 15 mL (15 mL Oral Given 12/3/24 2032)        ED Course:         Labs:    Lab Results (last 24 hours)       Procedure Component Value Units Date/Time    CBC & Differential [[id number]]  (Normal) Collected: 12/03/24 1622    Specimen: Blood from Arm, Right Updated: 12/03/24 1644    Narrative:      The following orders were created for panel order CBC & Differential.  Procedure                               Abnormality         Status                     ---------                               -----------         ------                     CBC Auto Differential[[id number]]        Normal              Final result                 Please view results for these tests on the individual orders.    Comprehensive Metabolic Panel [[id number]]  (Abnormal) Collected: 12/03/24 1622    Specimen: Blood from Arm, Right Updated: 12/03/24 1705     Glucose 128 mg/dL      BUN 10 mg/dL      Creatinine 0.96 mg/dL      Sodium 137 mmol/L      Potassium 3.8 mmol/L      Comment: Slight hemolysis detected by analyzer. Result may be falsely elevated.        Chloride 100 mmol/L      CO2 26.1 mmol/L      Calcium 9.7 mg/dL      Total Protein 7.8 g/dL      Albumin 4.8 g/dL      ALT (SGPT) 8 U/L      AST (SGOT) 13 U/L      Alkaline Phosphatase 67 U/L      Total Bilirubin 0.7 mg/dL      Globulin 3.0 gm/dL      A/G Ratio 1.6 g/dL      BUN/Creatinine Ratio 10.4     Anion Gap 10.9 mmol/L      eGFR 88.8 mL/min/1.73     Narrative:      GFR Normal >60  Chronic Kidney Disease <60  Kidney Failure <15      Lipase  [[id number]]  (Normal) Collected: 12/03/24 1622    Specimen: Blood from Arm, Right Updated: 12/03/24 1705     Lipase 15 U/L     Single High Sensitivity Troponin T [[id number]]  (Normal) Collected: 12/03/24 1622    Specimen: Blood from Arm, Right Updated: 12/03/24 1705     HS Troponin T <6 ng/L     Narrative:      High Sensitive Troponin T Reference Range:  <14.0 ng/L- Negative Female for AMI  <22.0 ng/L- Negative Male for AMI  >=14 - Abnormal Female indicating possible myocardial injury.  >=22 - Abnormal Male indicating possible myocardial injury.   Clinicians would have to utilize clinical acumen, EKG, Troponin, and serial changes to determine if it is an Acute Myocardial Infarction or myocardial injury due to an underlying chronic condition.         CBC Auto Differential [[id number]]  (Normal) Collected: 12/03/24 1622    Specimen: Blood from Arm, Right Updated: 12/03/24 1644     WBC 8.70 10*3/mm3      RBC 4.86 10*6/mm3      Hemoglobin 14.0 g/dL      Hematocrit 41.6 %      MCV 85.6 fL      MCH 28.8 pg      MCHC 33.7 g/dL      RDW 13.1 %      RDW-SD 40.8 fl      MPV 11.3 fL      Platelets 312 10*3/mm3      Neutrophil % 59.2 %      Lymphocyte % 32.3 %      Monocyte % 6.4 %      Eosinophil % 1.3 %      Basophil % 0.6 %      Immature Grans % 0.2 %      Neutrophils, Absolute 5.15 10*3/mm3      Lymphocytes, Absolute 2.81 10*3/mm3      Monocytes, Absolute 0.56 10*3/mm3      Eosinophils, Absolute 0.11 10*3/mm3      Basophils, Absolute 0.05 10*3/mm3      Immature Grans, Absolute 0.02 10*3/mm3      nRBC 0.0 /100 WBC     H. Pylori Antibody, IgG (VERNA, COR) [[id number]]  (Normal) Collected: 12/03/24 1622    Specimen: Blood from Arm, Right Updated: 12/03/24 1950     H. pylori IgG Negative    Urinalysis With Microscopic If Indicated (No Culture) - Urine, Clean Catch [[id number]]  (Abnormal) Collected: 12/03/24 1637    Specimen: Urine, Clean Catch Updated: 12/03/24 1651     Color, UA Yellow     Appearance, UA Clear     pH, UA 7.0      Specific Gravity, UA 1.010     Glucose, UA Negative     Ketones, UA 40 mg/dL (2+)     Bilirubin, UA Negative     Blood, UA Trace     Protein, UA Negative     Leuk Esterase, UA Negative     Nitrite, UA Negative     Urobilinogen, UA 0.2 E.U./dL    Urinalysis, Microscopic Only - Urine, Clean Catch [[id number]]  (Abnormal) Collected: 12/03/24 1637    Specimen: Urine, Clean Catch Updated: 12/03/24 1651     RBC, UA 3-5 /HPF      WBC, UA 0-2 /HPF      Bacteria, UA None Seen /HPF      Squamous Epithelial Cells, UA 0-2 /HPF      Hyaline Casts, UA 0-2 /LPF      Methodology Automated Microscopy             Imaging:    No Radiology Exams Resulted Within Past 24 Hours      Differential Diagnosis and Discussion:    Abdominal Pain: Based on the patient's signs and symptoms, I considered abdominal aortic aneurysm, small bowel obstruction, pancreatitis, acute cholecystitis, acute appendecitis, peptic ulcer disease, gastritis, colitis, endocrine disorders, irritable bowel syndrome and other differential diagnosis an etiology of the patient's abdominal pain.  Chest Pain:  Based on the patient's signs and symptoms, I considered aortic dissection, myocardial infaction, pulmonary embolism, cardiac tamponade, pericarditis, pneumothorax, musculoskeletal chest pain and other differential diagnosis as an etiology of the patient's chest pain.     All labs were reviewed and interpreted by me.  All X-rays impressions were independently interpreted by me.  EKG was interpreted by supervising attending.    MDM     Amount and/or Complexity of Data Reviewed  Clinical lab tests: reviewed  Tests in the radiology section of CPT®: reviewed    Risk of Complications, Morbidity, and/or Mortality  Presenting problems: moderate  Diagnostic procedures: low  Management options: low    Patient Progress  Patient progress: stable    The patient is resting comfortably and feels better, is alert and in no distress. Repeat examination is unremarkable and benign; in  particular, there's no discomfort at McBurney's point and there is no pulsatile mass. The patient has passed a po challenge in the ED and has no intractable vomiting. The history, exam, diagnostic testing, and current condition does not suggest acute appendicitis, bowel instruction, acute cholecystitis, bowel perforation, major gastrointestinal bleeding, severe diverticulitis, abdominal aortic aneurysm, mesenteric ischemia, volvulus, sepsis, or other significant pathology that warrants further testing, continued ED treatment, admission, for surgical evaluation at this point. The vital signs have been stable. The patient´s symptoms are consistent with gastritis. The patient has no peritoneal signs consistent with a perforated ulcer. The patient was counseled to avoid NSAIDS, coffee, spicy foods, alcohol, smoking and other irritants of the stomach. The patient was advised that they may seek  of a gastroenterologist for an outpatient endoscopy. The patient does not have uncontrollable pain, intractable vomiting, or other significant symptoms. The patient's condition is stable and appropriate for discharge from the emergency department.                  Patient Care Considerations:    CT CHEST: I considered ordering a CT scan of the chest, however cardiac workup is negative.      Consultants/Shared Management Plan:    None    Social Determinants of Health:    Patient is independent, reliable, and has access to care.       Disposition and Care Coordination:    Discharged: The patient is suitable and stable for discharge with no need for consideration of admission.    I have explained the patient´s condition, diagnoses and treatment plan based on the information available to me at this time. I have answered questions and addressed any concerns. The patient has a good  understanding of the patient´s diagnosis, condition, and treatment plan as can be expected at this point. The vital signs have been stable. The  patient´s condition is stable and appropriate for discharge from the emergency department.      The patient will pursue further outpatient evaluation with the primary care physician or other designated or consulting physician as outlined in the discharge instructions. They are agreeable to this plan of care and follow-up instructions have been explained in detail. The patient has received these instructions in written format and has expressed an understanding of the discharge instructions. The patient is aware that any significant change in condition or worsening of symptoms should prompt an immediate return to this or the closest emergency department or call to 1.  I have explained discharge medications and the need for follow up with the patient/caretakers. This was also printed in the discharge instructions. Patient was discharged with the following medications and follow up:      Medication List        New Prescriptions      famotidine 20 MG tablet  Commonly known as: PEPCID  Take 1 tablet by mouth 2 (Two) Times a Day As Needed for Heartburn.     polyethylene glycol 17 g packet  Commonly known as: MIRALAX  Take 17 g by mouth Daily.               Where to Get Your Medications        These medications were sent to [id number] - [place], [place] - [address] AT [facility] [place] - [phone]  - [phone]   [address] [place] [address]      Phone: [phone]   famotidine 20 MG tablet  polyethylene glycol 17 g packet      Juno Anderson MD  [address] [place] [address] [phone]    Schedule an appointment as soon as possible for a visit in 1 day         Final diagnoses:   Acute gastritis without hemorrhage, unspecified gastritis type   Constipation, unspecified constipation type        ED Disposition       ED Disposition   Discharge    Condition   Stable    Comment   --               This medical record created using voice  recognition software.             Sanjeev Mandujano PA  12/03/24 2046

## 2024-12-04 NOTE — ED PROVIDER NOTES
Time: 5:36 AM EST  Date of encounter:  12/4/2024  Independent Historian/Clinical History and Information was obtained by:   Patient  Chief Complaint: Shortness of breath    History is limited by: N/A    History of Present Illness:  Patient is a 64 y.o. year old male who presents to the emergency department for evaluation of shortness of breath.  Patient states that he woke up melanite short of breath.  The patient states he could not lay flat.  The patient thus came to the emergency room.  He notes a cough.  The patient does have some congestion and sore throat.  He denies a fever rigors or myalgias.  Patient states that he was seen yesterday in the emergency room for chest pain and abdominal pain but that is gone.  Patient denies any hematochezia or melena.  The patient has any blood loss.  The patient denies any pain or swelling in his legs.  The patient had no diaphoresis.    HPI    Patient Care Team  Primary Care Provider: Valeria Avendano APRN    Past Medical History:     No Known Allergies  Past Medical History:   Diagnosis Date    Acid reflux     Anxiety     Deep vein blood clot of left lower extremity     Gastric ulcer     Insomnia     Sciatic nerve pain     Scoliosis      Past Surgical History:   Procedure Laterality Date    COLONOSCOPY N/A 1/31/2024    Procedure: COLONOSCOPY WITH HOT SNARE POLYPECTOMY;  Surgeon: Juno Anderson MD;  Location: [facility] ENDOSCOPY;  Service: Gastroenterology;  Laterality: N/A;  COLON POLYP    ENDOSCOPY N/A 1/31/2024    Procedure: ESOPHAGOGASTRODUODENOSCOPY WITH BIOPSIES;  Surgeon: Juno Anderson MD;  Location: [facility] ENDOSCOPY;  Service: Gastroenterology;  Laterality: N/A;  HIATAL HERNIA, BARRETTS ESOPHAGUS    SPLENECTOMY      UPPER GASTROINTESTINAL ENDOSCOPY       Family History   Problem Relation Age of Onset    Mental illness Mother     Hypertension Mother     Alzheimer's disease Mother     Insomnia Father     Colon cancer Neg Hx        Home  Medications:  Prior to Admission medications    Medication Sig Start Date End Date Taking? Authorizing Provider   acetaminophen (TYLENOL) 500 MG tablet Take 1 tablet by mouth Every 6 (Six) Hours As Needed for Mild Pain for up to 60 days. 11/15/24 1/14/25  Rodrick Saldana MD   famotidine (PEPCID) 20 MG tablet Take 1 tablet by mouth 2 (Two) Times a Day As Needed for Heartburn. 12/3/24   Sanjeev Mandujano PA   finasteride (PROSCAR) 5 MG tablet TAKE 1 TABLET BY MOUTH EVERY DAY 24   Patricia Garcia APRN   omeprazole (priLOSEC) 40 MG capsule TAKE 1 CAPSULE BY MOUTH DAILY 3/12/24   Naima Kelly APRN   polyethylene glycol (MIRALAX) 17 g packet Take 17 g by mouth Daily. 12/3/24   Sanjeev Mandujano PA   Probiotic Product (Risaquad-2) capsule capsule Take 1 capsule by mouth Daily.    Provider, MD Santi   sucralfate (CARAFATE) 1 g tablet TAKE 1 TABLET BY MOUTH THREE TIMES DAILY 10/28/24   Naima Kelly APRN   tamsulosin (FLOMAX) 0.4 MG capsule 24 hr capsule TAKE 2 CAPSULES BY MOUTH EVERY DAY 11/15/24   Jeannette Rowell APRN   vitamin B-12 (CYANOCOBALAMIN) 1000 MCG tablet Take 1 tablet by mouth Daily. 10/2/24   Meron Hardwick APRN        Social History:   Social History     Tobacco Use    Smoking status: Former     Current packs/day: 0.00     Average packs/day: 2.0 packs/day for 25.0 years (50.0 ttl pk-yrs)     Types: Cigarettes     Start date:      Quit date:      Years since quittin.9     Passive exposure: Past    Smokeless tobacco: Never   Vaping Use    Vaping status: Never Used   Substance Use Topics    Alcohol use: Not Currently    Drug use: Not Currently         Review of Systems:  Review of Systems   Constitutional:  Positive for fatigue. Negative for chills, diaphoresis and fever.   HENT:  Positive for postnasal drip and rhinorrhea. Negative for congestion and sore throat.    Eyes:  Negative for photophobia.   Respiratory:  Positive for cough and shortness of breath. Negative for  "chest tightness.    Cardiovascular:  Negative for chest pain, palpitations and leg swelling.   Gastrointestinal:  Negative for abdominal pain, diarrhea, nausea and vomiting.   Genitourinary:  Negative for difficulty urinating, dysuria, flank pain, frequency, hematuria and urgency.   Musculoskeletal:  Negative for neck pain and neck stiffness.   Skin:  Negative for pallor and rash.   Neurological:  Negative for dizziness, syncope, weakness, numbness and headaches.   Hematological:  Negative for adenopathy. Does not bruise/bleed easily.   Psychiatric/Behavioral: Negative.          Physical Exam:  /86 (BP Location: Right arm, Patient Position: Sitting)   Pulse 86   Temp 98 °F (36.7 °C) (Oral)   Resp 19   Ht 168.9 cm (66.5\")   Wt 68.4 kg (150 lb 12.7 oz)   SpO2 98%   BMI 23.97 kg/m²     Physical Exam  Vitals and nursing note reviewed.   Constitutional:       General: He is not in acute distress.     Appearance: Normal appearance. He is not ill-appearing, toxic-appearing or diaphoretic.   HENT:      Head: Normocephalic and atraumatic.      Mouth/Throat:      Mouth: Mucous membranes are moist.   Eyes:      Pupils: Pupils are equal, round, and reactive to light.   Cardiovascular:      Rate and Rhythm: Normal rate and regular rhythm.      Pulses: Normal pulses.           Carotid pulses are 2+ on the right side and 2+ on the left side.       Radial pulses are 2+ on the right side and 2+ on the left side.        Femoral pulses are 2+ on the right side and 2+ on the left side.       Popliteal pulses are 2+ on the right side and 2+ on the left side.        Dorsalis pedis pulses are 2+ on the right side and 2+ on the left side.        Posterior tibial pulses are 2+ on the right side and 2+ on the left side.      Heart sounds: Normal heart sounds. No murmur heard.  Pulmonary:      Effort: Pulmonary effort is normal. No accessory muscle usage, respiratory distress or retractions.      Breath sounds: Examination of the " right-upper field reveals decreased breath sounds. Examination of the left-upper field reveals decreased breath sounds. Examination of the right-middle field reveals decreased breath sounds. Examination of the left-middle field reveals decreased breath sounds. Examination of the right-lower field reveals decreased breath sounds. Examination of the left-lower field reveals decreased breath sounds. Decreased breath sounds present. No wheezing, rhonchi or rales.   Chest:      Chest wall: No mass or tenderness.   Abdominal:      General: Abdomen is flat. There is no distension.      Palpations: Abdomen is soft. There is no mass or pulsatile mass.      Tenderness: There is no abdominal tenderness. There is no right CVA tenderness, left CVA tenderness, guarding or rebound.      Comments: No rigidity   Musculoskeletal:         General: No swelling, tenderness or deformity.      Cervical back: Neck supple. No tenderness.      Right lower leg: No tenderness. No edema.      Left lower leg: No tenderness. No edema.   Skin:     General: Skin is warm and dry.      Capillary Refill: Capillary refill takes less than 2 seconds.      Coloration: Skin is not jaundiced or pale.      Findings: No erythema.   Neurological:      General: No focal deficit present.      Mental Status: He is alert and oriented to person, place, and time. Mental status is at baseline.      Cranial Nerves: Cranial nerves 2-12 are intact. No cranial nerve deficit.      Sensory: Sensation is intact. No sensory deficit.      Motor: Motor function is intact. No weakness or pronator drift.      Coordination: Coordination is intact. Coordination normal.   Psychiatric:         Mood and Affect: Mood normal.         Behavior: Behavior normal.                  Procedures:  Procedures      Medical Decision Making:      Comorbidities that affect care:    DVT, insomnia, GERD, peptic ulcer disease, anxiety    External Notes reviewed:    None      The following orders were  placed and all results were independently analyzed by me:  Orders Placed This Encounter   Procedures    COVID-19, FLU A/B, RSV PCR 1 HR TAT - Swab, Nasopharynx    XR Chest 1 View    [place] Draw    Comprehensive Metabolic Panel    BNP    Single High Sensitivity Troponin T    CBC Auto Differential    D-dimer, Quantitative    Continuous Pulse Oximetry    Vital Signs    ECG 12 Lead ED Triage Standing Order; SOA    CBC & Differential    Green Top (Gel)    Lavender Top    Gold Top - SST    Light Blue Top       Medications Given in the Emergency Department:  Medications - No data to display       ED Course:    ED Course as of 12/08/24 0349   Wed Dec 04, 2024   0538 EKG:    Rhythm: Sinus rhythm  Rate: 79  Intervals: Normal NM and QT interval  T-wave: Nonspecific T wave flattening  ST Segment: No pathological ST elevation or reciprocal ST depression to suggest STEMI    EKG Comparison: No change in the QRS and ST morphology from the EKG that was performed earlier in the department    Interpreted by me   [SD]      ED Course User Index  [SD] Easton Polo DO       Labs:    Lab Results (last 24 hours)       ** No results found for the last 24 hours. **             Imaging:    No Radiology Exams Resulted Within Past 24 Hours      Differential Diagnosis and Discussion:    Dyspnea: Differential diagnosis includes but is not limited to metabolic acidosis, neurological disorders, psychogenic, asthma, pneumothorax, upper airway obstruction, COPD, pneumonia, noncardiogenic pulmonary edema, interstitial lung disease, anemia, congestive heart failure, and pulmonary embolism    All labs were reviewed and interpreted by me.  All X-rays impressions were independently interpreted by me.  EKG was interpreted by me.    MDM  Number of Diagnoses or Management Options  Dyspnea, unspecified type  Diagnosis management comments: The patient's CBC was reviewed and shows no abnormalities of critical concern.  Of note, there is no anemia requiring a  "blood transfusion and the platelet count is acceptable    The patient's CMP was reviewed and shows no abnormalities of critical concern.  Of note, the patient's sodium and potassium are acceptable.  The patient's liver enzymes are unremarkable.  The patient's renal function including creatinine is preserved.  The patient has a normal anion gap.    The patient had a normal proBNP.  This makes acute decompensated heart failure unlikely    Chest x-ray was performed while in the emergency room.  The chest x-ray demonstrated no acute cardiopulmonary process    The patient's EKG demonstrated a normal sinus rhythm.  The EKG demonstrated no evidence of dysrhythmia.  The patient had no acute ST abnormalities or acute T wave abnormalities to indicate STEMI or other acute cardiac pathology, injury or ischemia    Patient had a normal D-dimer    Wells' Criteria for Pulmonary Embolism - MDCalc  Calculated on Dec 04 2024 6:24 AM  1.5 points -> Low risk group: 1.3% chance of PE in an ED population. Another study assigned scores <= 4 as \"PE Unlikely\" and had a 3% incidence of PE.    The patient had a low pretest clinical probability Wells score for pulmonary embolism.  The patient has a normal D-dimer.  The patient understands that they are low risk for pulmonary embolism.  I discussed performing a CAT scan of the chest to rule out pulmonary embolism with the patient.  Due to the fact that the patient is low risk for a pulmonary embolism and then discussing the risks of radiation exposure from the CAT scan,  the patient does not want to have a CAT scan performed at this time.    The patient was given very specific instructions on when and why to return to the emergency room.  The patient voiced understanding and felt comfortable with the discharge instructions.  They would return to the emergency room if necessary.  The patient appears appropriate for discharge and outpatient follow-up.         Amount and/or Complexity of Data " Reviewed  Clinical lab tests: reviewed  Tests in the radiology section of CPT®: reviewed  Tests in the medicine section of CPT®: reviewed             Social Determinants of Health:    Patient is independent, reliable, and has access to care.       Disposition and Care Coordination:    Discharged: The patient is suitable and stable for discharge with no need for consideration of admission.    I have explained discharge medications and the need for follow up with the patient/caretakers. This was also printed in the discharge instructions. Patient was discharged with the following medications and follow up:      Medication List        Changed      tamsulosin 0.4 MG capsule 24 hr capsule  Commonly known as: FLOMAX  TAKE 2 CAPSULES BY MOUTH EVERY DAY  What changed: how much to take           Valeria Avendano, APRN  [address] [place] [address] [phone]    Schedule an appointment as soon as possible for a visit on 12/6/2024         Final diagnoses:   Dyspnea, unspecified type        ED Disposition       ED Disposition   Discharge    Condition   Stable    Comment   --               This medical record created using voice recognition software.             Easton Polo DO  12/08/24 0349

## 2024-12-05 ENCOUNTER — APPOINTMENT (OUTPATIENT)
Dept: CARDIOLOGY | Facility: HOSPITAL | Age: 64
End: 2024-12-05
Payer: COMMERCIAL

## 2024-12-05 ENCOUNTER — ANESTHESIA EVENT (OUTPATIENT)
Dept: GASTROENTEROLOGY | Facility: HOSPITAL | Age: 64
End: 2024-12-05
Payer: COMMERCIAL

## 2024-12-05 ENCOUNTER — ANESTHESIA (OUTPATIENT)
Dept: GASTROENTEROLOGY | Facility: HOSPITAL | Age: 64
End: 2024-12-05
Payer: COMMERCIAL

## 2024-12-05 LAB
ANION GAP SERPL CALCULATED.3IONS-SCNC: 15.5 MMOL/L (ref 5–15)
BASOPHILS # BLD AUTO: 0.06 10*3/MM3 (ref 0–0.2)
BASOPHILS NFR BLD AUTO: 0.7 % (ref 0–1.5)
BUN SERPL-MCNC: 19 MG/DL (ref 8–23)
BUN/CREAT SERPL: 17.9 (ref 7–25)
CALCIUM SPEC-SCNC: 9.3 MG/DL (ref 8.6–10.5)
CHLORIDE SERPL-SCNC: 101 MMOL/L (ref 98–107)
CO2 SERPL-SCNC: 19.5 MMOL/L (ref 22–29)
CREAT SERPL-MCNC: 1.06 MG/DL (ref 0.76–1.27)
DEPRECATED RDW RBC AUTO: 43.3 FL (ref 37–54)
EGFRCR SERPLBLD CKD-EPI 2021: 78.9 ML/MIN/1.73
EOSINOPHIL # BLD AUTO: 0.61 10*3/MM3 (ref 0–0.4)
EOSINOPHIL NFR BLD AUTO: 6.7 % (ref 0.3–6.2)
ERYTHROCYTE [DISTWIDTH] IN BLOOD BY AUTOMATED COUNT: 13.3 % (ref 12.3–15.4)
GLUCOSE SERPL-MCNC: 70 MG/DL (ref 65–99)
HCT VFR BLD AUTO: 41.8 % (ref 37.5–51)
HGB BLD-MCNC: 13.6 G/DL (ref 13–17.7)
IMM GRANULOCYTES # BLD AUTO: 0.02 10*3/MM3 (ref 0–0.05)
IMM GRANULOCYTES NFR BLD AUTO: 0.2 % (ref 0–0.5)
LYMPHOCYTES # BLD AUTO: 1.62 10*3/MM3 (ref 0.7–3.1)
LYMPHOCYTES NFR BLD AUTO: 17.9 % (ref 19.6–45.3)
MAGNESIUM SERPL-MCNC: 2.2 MG/DL (ref 1.6–2.4)
MCH RBC QN AUTO: 28.8 PG (ref 26.6–33)
MCHC RBC AUTO-ENTMCNC: 32.5 G/DL (ref 31.5–35.7)
MCV RBC AUTO: 88.4 FL (ref 79–97)
MONOCYTES # BLD AUTO: 0.55 10*3/MM3 (ref 0.1–0.9)
MONOCYTES NFR BLD AUTO: 6.1 % (ref 5–12)
NEUTROPHILS NFR BLD AUTO: 6.21 10*3/MM3 (ref 1.7–7)
NEUTROPHILS NFR BLD AUTO: 68.4 % (ref 42.7–76)
NRBC BLD AUTO-RTO: 0 /100 WBC (ref 0–0.2)
PHOSPHATE SERPL-MCNC: 3.4 MG/DL (ref 2.5–4.5)
PLATELET # BLD AUTO: 311 10*3/MM3 (ref 140–450)
PMV BLD AUTO: 10.9 FL (ref 6–12)
POTASSIUM SERPL-SCNC: 4.2 MMOL/L (ref 3.5–5.2)
QT INTERVAL: 370 MS
QT INTERVAL: 383 MS
QTC INTERVAL: 426 MS
QTC INTERVAL: 428 MS
RBC # BLD AUTO: 4.73 10*6/MM3 (ref 4.14–5.8)
SODIUM SERPL-SCNC: 136 MMOL/L (ref 136–145)
WBC NRBC COR # BLD AUTO: 9.07 10*3/MM3 (ref 3.4–10.8)

## 2024-12-05 PROCEDURE — 93306 TTE W/DOPPLER COMPLETE: CPT

## 2024-12-05 PROCEDURE — 25010000002 PROPOFOL 10 MG/ML EMULSION

## 2024-12-05 PROCEDURE — G0378 HOSPITAL OBSERVATION PER HR: HCPCS

## 2024-12-05 PROCEDURE — 99223 1ST HOSP IP/OBS HIGH 75: CPT | Performed by: INTERNAL MEDICINE

## 2024-12-05 PROCEDURE — 99233 SBSQ HOSP IP/OBS HIGH 50: CPT | Performed by: INTERNAL MEDICINE

## 2024-12-05 PROCEDURE — 25010000002 LIDOCAINE PF 2% 2 % SOLUTION

## 2024-12-05 PROCEDURE — 94762 N-INVAS EAR/PLS OXIMTRY CONT: CPT

## 2024-12-05 PROCEDURE — 25810000003 LACTATED RINGERS PER 1000 ML

## 2024-12-05 PROCEDURE — 88305 TISSUE EXAM BY PATHOLOGIST: CPT | Performed by: INTERNAL MEDICINE

## 2024-12-05 PROCEDURE — 85025 COMPLETE CBC W/AUTO DIFF WBC: CPT | Performed by: HOSPITALIST

## 2024-12-05 PROCEDURE — 43239 EGD BIOPSY SINGLE/MULTIPLE: CPT | Performed by: INTERNAL MEDICINE

## 2024-12-05 PROCEDURE — 84100 ASSAY OF PHOSPHORUS: CPT | Performed by: HOSPITALIST

## 2024-12-05 PROCEDURE — 80048 BASIC METABOLIC PNL TOTAL CA: CPT | Performed by: HOSPITALIST

## 2024-12-05 PROCEDURE — 93306 TTE W/DOPPLER COMPLETE: CPT | Performed by: INTERNAL MEDICINE

## 2024-12-05 PROCEDURE — 83735 ASSAY OF MAGNESIUM: CPT | Performed by: HOSPITALIST

## 2024-12-05 RX ORDER — PROPOFOL 10 MG/ML
VIAL (ML) INTRAVENOUS AS NEEDED
Status: DISCONTINUED | OUTPATIENT
Start: 2024-12-05 | End: 2024-12-05 | Stop reason: SURG

## 2024-12-05 RX ORDER — IPRATROPIUM BROMIDE AND ALBUTEROL SULFATE 2.5; .5 MG/3ML; MG/3ML
3 SOLUTION RESPIRATORY (INHALATION) EVERY 6 HOURS PRN
Status: DISCONTINUED | OUTPATIENT
Start: 2024-12-05 | End: 2024-12-09 | Stop reason: HOSPADM

## 2024-12-05 RX ORDER — SODIUM CHLORIDE, SODIUM LACTATE, POTASSIUM CHLORIDE, CALCIUM CHLORIDE 600; 310; 30; 20 MG/100ML; MG/100ML; MG/100ML; MG/100ML
30 INJECTION, SOLUTION INTRAVENOUS CONTINUOUS
Status: ACTIVE | OUTPATIENT
Start: 2024-12-05 | End: 2024-12-05

## 2024-12-05 RX ORDER — LIDOCAINE HYDROCHLORIDE 20 MG/ML
INJECTION, SOLUTION EPIDURAL; INFILTRATION; INTRACAUDAL; PERINEURAL AS NEEDED
Status: DISCONTINUED | OUTPATIENT
Start: 2024-12-05 | End: 2024-12-05 | Stop reason: SURG

## 2024-12-05 RX ORDER — ALPRAZOLAM 0.25 MG/1
0.25 TABLET ORAL 3 TIMES DAILY PRN
Status: DISCONTINUED | OUTPATIENT
Start: 2024-12-05 | End: 2024-12-09 | Stop reason: HOSPADM

## 2024-12-05 RX ORDER — BUDESONIDE AND FORMOTEROL FUMARATE DIHYDRATE 160; 4.5 UG/1; UG/1
2 AEROSOL RESPIRATORY (INHALATION)
Status: DISCONTINUED | OUTPATIENT
Start: 2024-12-05 | End: 2024-12-09 | Stop reason: HOSPADM

## 2024-12-05 RX ADMIN — Medication 10 ML: at 09:31

## 2024-12-05 RX ADMIN — ALUMINUM HYDROXIDE, MAGNESIUM HYDROXIDE, AND DIMETHICONE 15 ML: 400; 400; 40 SUSPENSION ORAL at 23:34

## 2024-12-05 RX ADMIN — LIDOCAINE HYDROCHLORIDE 50 MG: 20 INJECTION, SOLUTION INTRAVENOUS at 15:08

## 2024-12-05 RX ADMIN — PROPOFOL 100 MG: 10 INJECTION, EMULSION INTRAVENOUS at 15:08

## 2024-12-05 RX ADMIN — Medication 10 ML: at 21:50

## 2024-12-05 RX ADMIN — ACETAMINOPHEN 650 MG: 160 SOLUTION ORAL at 23:38

## 2024-12-05 RX ADMIN — SENNOSIDES AND DOCUSATE SODIUM 2 TABLET: 50; 8.6 TABLET ORAL at 21:50

## 2024-12-05 RX ADMIN — PANTOPRAZOLE SODIUM 40 MG: 40 INJECTION, POWDER, FOR SOLUTION INTRAVENOUS at 17:49

## 2024-12-05 RX ADMIN — PROPOFOL 200 MCG/KG/MIN: 10 INJECTION, EMULSION INTRAVENOUS at 15:09

## 2024-12-05 RX ADMIN — SODIUM CHLORIDE, SODIUM LACTATE, POTASSIUM CHLORIDE, CALCIUM CHLORIDE 30 ML/HR: 20; 30; 600; 310 INJECTION, SOLUTION INTRAVENOUS at 16:40

## 2024-12-05 NOTE — ANESTHESIA POSTPROCEDURE EVALUATION
Patient: Yoni Aleman    Procedure Summary       Date: 12/05/24 Room / Location: [facility] ENDOSCOPY 2 / [facility] ENDOSCOPY    Anesthesia Start: 1507 Anesthesia Stop: 1520    Procedure: ESOPHAGOGASTRODUODENOSCOPY WITH BIOPSIES Diagnosis:       Upper abdominal pain      Epigastric pain      (Upper abdominal pain [R10.10])      (Epigastric pain [R10.13])    Surgeons: Juno Anderson MD Provider: Esteban Kelsey CRNA    Anesthesia Type: general ASA Status: 3            Anesthesia Type: general    Vitals  Vitals Value Taken Time   /77 12/05/24 1534   Temp 36.7 °C (98.1 °F) 12/05/24 1534   Pulse 97 12/05/24 1534   Resp 13 12/05/24 1534   SpO2 96 % 12/05/24 1534           Post Anesthesia Care and Evaluation    Post-procedure mental status: acceptable.  Pain management: satisfactory to patient    Airway patency: patent  Anesthetic complications: No anesthetic complications    Cardiovascular status: acceptable  Respiratory status: acceptable    Comments: Per chart review

## 2024-12-05 NOTE — PLAN OF CARE
Goal Outcome Evaluation:  Plan of Care Reviewed With: patient        Progress: no change  Outcome Evaluation: Pt is a new admit from the ER. Vitals WNL. Pt does complain of tenderness in his abdomen. Girlfriend at bedside. Will continue plan of care.                              ED MD
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
## 2024-12-05 NOTE — PLAN OF CARE
Goal Outcome Evaluation:  Plan of Care Reviewed With: patient           Outcome Evaluation: Patient rested well overnight VSS and no new c/o at this time. Patient NPO since 0000. Will continue to monitor.
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
## 2024-12-05 NOTE — PLAN OF CARE
Goal Outcome Evaluation:      Pt had EGD today VSS put on continous pulse ox per orders. No c/o's of pain this afternoon .
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
## 2024-12-05 NOTE — CONSULTS
Mr. Aleman is being followed by [facility], [place], operated by [facility] sleep group.  Patient completed home sleep test 11.21.24, but the device stopped recording at 48 minutes.  Patient has current order from Dr. Motley to repeat home sleep test as of 11/22/24. He will need to reach out to Sleep lab to schedule the test.

## 2024-12-06 ENCOUNTER — APPOINTMENT (OUTPATIENT)
Dept: CT IMAGING | Facility: HOSPITAL | Age: 64
End: 2024-12-06
Payer: COMMERCIAL

## 2024-12-06 LAB
ANION GAP SERPL CALCULATED.3IONS-SCNC: 10.4 MMOL/L (ref 5–15)
BASOPHILS # BLD AUTO: 0.04 10*3/MM3 (ref 0–0.2)
BASOPHILS NFR BLD AUTO: 0.5 % (ref 0–1.5)
BH CV ECHO MEAS - AO ROOT DIAM: 2.5 CM
BH CV ECHO MEAS - EDV(CUBED): 91.1 ML
BH CV ECHO MEAS - EDV(MOD-SP2): 40.6 ML
BH CV ECHO MEAS - EDV(MOD-SP4): 60.3 ML
BH CV ECHO MEAS - EF(MOD-BP): 59 %
BH CV ECHO MEAS - EF(MOD-SP2): 55.9 %
BH CV ECHO MEAS - EF(MOD-SP4): 62.2 %
BH CV ECHO MEAS - ESV(CUBED): 35.9 ML
BH CV ECHO MEAS - ESV(MOD-SP2): 17.9 ML
BH CV ECHO MEAS - ESV(MOD-SP4): 22.8 ML
BH CV ECHO MEAS - FS: 26.7 %
BH CV ECHO MEAS - IVS/LVPW: 1 CM
BH CV ECHO MEAS - IVSD: 1 CM
BH CV ECHO MEAS - LA DIMENSION: 2.5 CM
BH CV ECHO MEAS - LAT PEAK E' VEL: 14.1 CM/SEC
BH CV ECHO MEAS - LV MASS(C)D: 153.3 GRAMS
BH CV ECHO MEAS - LVIDD: 4.5 CM
BH CV ECHO MEAS - LVIDS: 3.3 CM
BH CV ECHO MEAS - LVOT AREA: 3.1 CM2
BH CV ECHO MEAS - LVOT DIAM: 2 CM
BH CV ECHO MEAS - LVPWD: 1 CM
BH CV ECHO MEAS - MED PEAK E' VEL: 8.8 CM/SEC
BH CV ECHO MEAS - MV A MAX VEL: 95.4 CM/SEC
BH CV ECHO MEAS - MV DEC SLOPE: 389 CM/SEC2
BH CV ECHO MEAS - MV DEC TIME: 0.24 SEC
BH CV ECHO MEAS - MV E MAX VEL: 92.8 CM/SEC
BH CV ECHO MEAS - MV E/A: 0.97
BH CV ECHO MEAS - MV MEAN PG: 2 MMHG
BH CV ECHO MEAS - MV V2 VTI: 25.1 CM
BH CV ECHO MEAS - RVDD: 2.1 CM
BH CV ECHO MEAS - SV(MOD-SP2): 22.7 ML
BH CV ECHO MEAS - SV(MOD-SP4): 37.5 ML
BH CV ECHO MEASUREMENTS AVERAGE E/E' RATIO: 8.1
BUN SERPL-MCNC: 22 MG/DL (ref 8–23)
BUN/CREAT SERPL: 23.2 (ref 7–25)
CALCIUM SPEC-SCNC: 9.4 MG/DL (ref 8.6–10.5)
CHLORIDE SERPL-SCNC: 101 MMOL/L (ref 98–107)
CO2 SERPL-SCNC: 24.6 MMOL/L (ref 22–29)
CREAT SERPL-MCNC: 0.95 MG/DL (ref 0.76–1.27)
DEPRECATED RDW RBC AUTO: 42.6 FL (ref 37–54)
EGFRCR SERPLBLD CKD-EPI 2021: 89.9 ML/MIN/1.73
EOSINOPHIL # BLD AUTO: 0.44 10*3/MM3 (ref 0–0.4)
EOSINOPHIL NFR BLD AUTO: 5.8 % (ref 0.3–6.2)
ERYTHROCYTE [DISTWIDTH] IN BLOOD BY AUTOMATED COUNT: 13.4 % (ref 12.3–15.4)
GLUCOSE SERPL-MCNC: 111 MG/DL (ref 65–99)
HCT VFR BLD AUTO: 40.1 % (ref 37.5–51)
HGB BLD-MCNC: 13.3 G/DL (ref 13–17.7)
HOLD SPECIMEN: NORMAL
IMM GRANULOCYTES # BLD AUTO: 0.02 10*3/MM3 (ref 0–0.05)
IMM GRANULOCYTES NFR BLD AUTO: 0.3 % (ref 0–0.5)
LYMPHOCYTES # BLD AUTO: 2.43 10*3/MM3 (ref 0.7–3.1)
LYMPHOCYTES NFR BLD AUTO: 32.2 % (ref 19.6–45.3)
MAGNESIUM SERPL-MCNC: 2.1 MG/DL (ref 1.6–2.4)
MCH RBC QN AUTO: 28.9 PG (ref 26.6–33)
MCHC RBC AUTO-ENTMCNC: 33.2 G/DL (ref 31.5–35.7)
MCV RBC AUTO: 87.2 FL (ref 79–97)
MONOCYTES # BLD AUTO: 0.71 10*3/MM3 (ref 0.1–0.9)
MONOCYTES NFR BLD AUTO: 9.4 % (ref 5–12)
NEUTROPHILS NFR BLD AUTO: 3.9 10*3/MM3 (ref 1.7–7)
NEUTROPHILS NFR BLD AUTO: 51.8 % (ref 42.7–76)
NRBC BLD AUTO-RTO: 0 /100 WBC (ref 0–0.2)
PHOSPHATE SERPL-MCNC: 2.7 MG/DL (ref 2.5–4.5)
PLATELET # BLD AUTO: 297 10*3/MM3 (ref 140–450)
PMV BLD AUTO: 10.8 FL (ref 6–12)
POTASSIUM SERPL-SCNC: 4.1 MMOL/L (ref 3.5–5.2)
RBC # BLD AUTO: 4.6 10*6/MM3 (ref 4.14–5.8)
SODIUM SERPL-SCNC: 136 MMOL/L (ref 136–145)
WBC NRBC COR # BLD AUTO: 7.54 10*3/MM3 (ref 3.4–10.8)

## 2024-12-06 PROCEDURE — 97165 OT EVAL LOW COMPLEX 30 MIN: CPT

## 2024-12-06 PROCEDURE — G0378 HOSPITAL OBSERVATION PER HR: HCPCS

## 2024-12-06 PROCEDURE — 94664 DEMO&/EVAL PT USE INHALER: CPT

## 2024-12-06 PROCEDURE — 94799 UNLISTED PULMONARY SVC/PX: CPT

## 2024-12-06 PROCEDURE — 85025 COMPLETE CBC W/AUTO DIFF WBC: CPT | Performed by: HOSPITALIST

## 2024-12-06 PROCEDURE — 94640 AIRWAY INHALATION TREATMENT: CPT

## 2024-12-06 PROCEDURE — 92610 EVALUATE SWALLOWING FUNCTION: CPT

## 2024-12-06 PROCEDURE — 80048 BASIC METABOLIC PNL TOTAL CA: CPT | Performed by: HOSPITALIST

## 2024-12-06 PROCEDURE — 99232 SBSQ HOSP IP/OBS MODERATE 35: CPT | Performed by: INTERNAL MEDICINE

## 2024-12-06 PROCEDURE — 74175 CTA ABDOMEN W/CONTRAST: CPT

## 2024-12-06 PROCEDURE — 97161 PT EVAL LOW COMPLEX 20 MIN: CPT

## 2024-12-06 PROCEDURE — 84100 ASSAY OF PHOSPHORUS: CPT | Performed by: HOSPITALIST

## 2024-12-06 PROCEDURE — 25510000001 IOPAMIDOL PER 1 ML: Performed by: INTERNAL MEDICINE

## 2024-12-06 PROCEDURE — 82785 ASSAY OF IGE: CPT | Performed by: INTERNAL MEDICINE

## 2024-12-06 PROCEDURE — 83735 ASSAY OF MAGNESIUM: CPT | Performed by: HOSPITALIST

## 2024-12-06 PROCEDURE — 87305 ASPERGILLUS AG IA: CPT | Performed by: INTERNAL MEDICINE

## 2024-12-06 RX ORDER — IOPAMIDOL 755 MG/ML
100 INJECTION, SOLUTION INTRAVASCULAR
Status: COMPLETED | OUTPATIENT
Start: 2024-12-06 | End: 2024-12-06

## 2024-12-06 RX ORDER — PANTOPRAZOLE SODIUM 40 MG/1
40 TABLET, DELAYED RELEASE ORAL
Status: DISCONTINUED | OUTPATIENT
Start: 2024-12-07 | End: 2024-12-09 | Stop reason: HOSPADM

## 2024-12-06 RX ORDER — IOPAMIDOL 755 MG/ML
100 INJECTION, SOLUTION INTRAVASCULAR
Status: CANCELLED | OUTPATIENT
Start: 2024-12-06 | End: 2024-12-06

## 2024-12-06 RX ADMIN — PANTOPRAZOLE SODIUM 40 MG: 40 INJECTION, POWDER, FOR SOLUTION INTRAVENOUS at 08:14

## 2024-12-06 RX ADMIN — LIDOCAINE 1 PATCH: 4 PATCH TOPICAL at 08:14

## 2024-12-06 RX ADMIN — IOPAMIDOL 100 ML: 755 INJECTION, SOLUTION INTRAVENOUS at 15:15

## 2024-12-06 RX ADMIN — SENNOSIDES AND DOCUSATE SODIUM 2 TABLET: 50; 8.6 TABLET ORAL at 08:14

## 2024-12-06 RX ADMIN — Medication 10 ML: at 21:00

## 2024-12-06 RX ADMIN — TAMSULOSIN HYDROCHLORIDE 0.4 MG: 0.4 CAPSULE ORAL at 08:14

## 2024-12-06 RX ADMIN — CYANOCOBALAMIN TAB 500 MCG 1000 MCG: 500 TAB at 08:14

## 2024-12-06 RX ADMIN — Medication 10 ML: at 08:15

## 2024-12-06 RX ADMIN — ALPRAZOLAM 0.25 MG: 0.25 TABLET ORAL at 21:04

## 2024-12-06 RX ADMIN — FINASTERIDE 5 MG: 5 TABLET, FILM COATED ORAL at 08:14

## 2024-12-06 RX ADMIN — BUDESONIDE AND FORMOTEROL FUMARATE DIHYDRATE 2 PUFF: 160; 4.5 AEROSOL RESPIRATORY (INHALATION) at 12:16

## 2024-12-06 RX ADMIN — Medication 1 TABLET: at 08:14

## 2024-12-06 RX ADMIN — ALUMINUM HYDROXIDE, MAGNESIUM HYDROXIDE, AND DIMETHICONE 15 ML: 400; 400; 40 SUSPENSION ORAL at 05:09

## 2024-12-06 RX ADMIN — ACETAMINOPHEN 650 MG: 160 SOLUTION ORAL at 05:09

## 2024-12-06 RX ADMIN — BUDESONIDE AND FORMOTEROL FUMARATE DIHYDRATE 2 PUFF: 160; 4.5 AEROSOL RESPIRATORY (INHALATION) at 20:46

## 2024-12-06 NOTE — THERAPY EVALUATION
Acute Care - Speech Language Pathology   Swallow Initial Evaluation  Obdulio     Patient Name: Yoni Aleman  : 1960  MRN: [id number]  Today's Date: 2024               Admit Date: 2024    Visit Dx:     ICD-10-CM ICD-9-CM   1. Upper abdominal pain  R10.10 789.09   2. Acute gastritis, presence of bleeding unspecified, unspecified gastritis type  K29.00 535.00   3. Epigastric pain  R10.13 789.06   4. Dysphagia, oropharyngeal  R13.12 787.22     Patient Active Problem List   Diagnosis    MRSA (methicillin resistant staph aureus) culture positive    Anemia    Sciatic nerve pain    Gastric ulcer    Gastro-esophageal reflux disease without esophagitis    Altered bowel habits    Urinary retention    Hx of blood clots    History of splenectomy    Chronic midline low back pain with bilateral sciatica    Epigastric pain    History of pulmonary embolism    Deep vein thrombosis (DVT) of left lower extremity    Benign prostatic hyperplasia with lower urinary tract symptoms    Dysuria    Upper abdominal pain    Gastritis     Past Medical History:   Diagnosis Date    Acid reflux     Anxiety     Deep vein blood clot of left lower extremity     Gastric ulcer     Insomnia     Sciatic nerve pain     Scoliosis      Past Surgical History:   Procedure Laterality Date    COLONOSCOPY N/A 2024    Procedure: COLONOSCOPY WITH HOT SNARE POLYPECTOMY;  Surgeon: Juno Anderson MD;  Location: [facility] ENDOSCOPY;  Service: Gastroenterology;  Laterality: N/A;  COLON POLYP    ENDOSCOPY N/A 2024    Procedure: ESOPHAGOGASTRODUODENOSCOPY WITH BIOPSIES;  Surgeon: Juno Anderson MD;  Location: [facility] ENDOSCOPY;  Service: Gastroenterology;  Laterality: N/A;  HIATAL HERNIA, BARRETTS ESOPHAGUS    SPLENECTOMY      UPPER GASTROINTESTINAL ENDOSCOPY       Inpatient Speech Pathology Dysphagia Evaluation      PAIN SCALE: None noted    PRECAUTIONS/CONTRAINDICATIONS: None noted    SUSPECTED ABUSE/NEGLECT/EXPLOITATION: None  noted    SOCIAL/PSYCHOLOGICAL NEEDS/BARRIERS: None noted    PAST SOCIAL HISTORY: Lives at home    PRIOR FUNCTION: Independent    PATIENT GOALS/EXPECTATIONS: Did not report    HISTORY: This patient is a 64-year-old admitted with the above diagnosis.  Status post EGD yesterday, see GI report for details.  Patient reports solids and oral meds sticking retrosternally and when he takes a drink of water to clear bolus comes back up and he has choking episodes.  Denies overt difficulty at moment of swallow.    CURRENT DIET LEVEL: Regular diet-thin liquids    OBJECTIVE:    TEST ADMINISTERED: Clinical dysphagia evaluation    COGNITION/SAFETY AWARENESS: Alert and oriented    BEHAVIORAL OBSERVATIONS: Pleasant and cooperative    ORAL MOTOR EXAM: Lingual and labial strength and range of motion within functional limits.  Edentulous gaps    VOICE QUALITY: Mild hoarseness    REFLEX EXAM: Deferred    POSTURE: 90 degrees upright    FEEDING/SWALLOWING FUNCTION: Assessed with full range of consistencies with thin liquids from spoon cup and straw, purée consistencies soft and hard solids.    CLINICAL OBSERVATIONS: Oral stage is characterized by good bolus preparation and control.  Timely oral transit.  Pharyngeal phase is timely with good laryngeal elevation per palpation.  No clinical signs or symptoms of aspiration are noted.  Vocal quality remained clear.  Patient does report retrosternal sensation of burning and pain when swallowing.    DYSPHAGIA CRITERIA: N/A    FUNCTIONAL ASSESSMENT INSTRUMENT: Patient currently scored a level 7 of 7 on Functional Communication Measures for swallowing indicating a 0% limitation in function.    ASSESSMENT/ PLAN OF CARE:  Pt presents with functional oropharyngeal swallow.  No clinical signs or symptoms of aspiration noted.  Patient does report retrosternal sensation of burning pain when swallowing.      RECOMMENDATIONS:   1.   DIET: Regular diet-thin liquid    2.  POSITION: 90 degrees upright,  strict reflux precaution    3.  COMPENSATORY STRATEGIES: Slow rate, double swallow, patient may benefit from 6 smaller meals versus 3 large meals per day.  Encouraged patient to do multiple dry swallows after swallowing oral meds or solids before taking large drinks of liquids.    Pt/responsible party agrees with plan of care and has been informed of all alternatives, risks and benefits.    EDUCATION  The patient has been educated in the following areas:   Dysphagia (Swallowing Impairment).          Sabina Suarez, SLP  12/6/2024

## 2024-12-06 NOTE — PLAN OF CARE
Goal Outcome Evaluation:               Pt resting comfortably today states he feels like his medication and food is sticking in his esophagus and that when it does he can cough and it comes back up . Will continue to monitor ; VSS no changes noted in patients condition

## 2024-12-06 NOTE — PLAN OF CARE
Goal Outcome Evaluation:                      FUNCTIONAL ASSESSMENT INSTRUMENT: Patient currently scored a level 7 of 7 on Functional Communication Measures for swallowing indicating a 0% limitation in function.     ASSESSMENT/ PLAN OF CARE:  Pt presents with functional oropharyngeal swallow.  No clinical signs or symptoms of aspiration noted.  Patient does report retrosternal sensation of burning pain when swallowing.       RECOMMENDATIONS:   1.   DIET: Regular diet-thin liquid     2.  POSITION: 90 degrees upright, strict reflux precaution     3.  COMPENSATORY STRATEGIES: Slow rate, double swallow, patient may benefit from 6 smaller meals versus 3 large meals per day.  Encouraged patient to do multiple dry swallows after swallowing oral meds or solids before taking large drinks of liquids.     Pt/responsible party agrees with plan of care and has been informed of all alternatives, risks and benefits.     EDUCATION  The patient has been educated in the following areas:   Dysphagia (Swallowing Impairment).

## 2024-12-06 NOTE — PLAN OF CARE
Goal Outcome Evaluation:  Plan of Care Reviewed With: patient        Progress: improving  Outcome Evaluation: OT evaluation completed with no deficits identified.  Patient is currently walking to and from the bathroom independently and reports no problems with self-care activities.  Upper extremity function is within normal limits.  Patient agreeable no additional OT services are necessary    Anticipated Discharge Disposition (OT): home

## 2024-12-06 NOTE — THERAPY EVALUATION
Patient Name: Yoni Aleman  : 1960    MRN: [id number]                              Today's Date: 2024       Admit Date: 2024    Visit Dx:     ICD-10-CM ICD-9-CM   1. Upper abdominal pain  R10.10 789.09   2. Acute gastritis, presence of bleeding unspecified, unspecified gastritis type  K29.00 535.00   3. Epigastric pain  R10.13 789.06   4. Dysphagia, oropharyngeal  R13.12 787.22     Patient Active Problem List   Diagnosis    MRSA (methicillin resistant staph aureus) culture positive    Anemia    Sciatic nerve pain    Gastric ulcer    Gastro-esophageal reflux disease without esophagitis    Altered bowel habits    Urinary retention    Hx of blood clots    History of splenectomy    Chronic midline low back pain with bilateral sciatica    Epigastric pain    History of pulmonary embolism    Deep vein thrombosis (DVT) of left lower extremity    Benign prostatic hyperplasia with lower urinary tract symptoms    Dysuria    Upper abdominal pain    Gastritis     Past Medical History:   Diagnosis Date    Acid reflux     Anxiety     Deep vein blood clot of left lower extremity     Gastric ulcer     Insomnia     Sciatic nerve pain     Scoliosis      Past Surgical History:   Procedure Laterality Date    COLONOSCOPY N/A 2024    Procedure: COLONOSCOPY WITH HOT SNARE POLYPECTOMY;  Surgeon: Juno Anderson MD;  Location: [facility] ENDOSCOPY;  Service: Gastroenterology;  Laterality: N/A;  COLON POLYP    ENDOSCOPY N/A 2024    Procedure: ESOPHAGOGASTRODUODENOSCOPY WITH BIOPSIES;  Surgeon: Juno Anderson MD;  Location: [facility] ENDOSCOPY;  Service: Gastroenterology;  Laterality: N/A;  HIATAL HERNIA, BARRETTS ESOPHAGUS    SPLENECTOMY      UPPER GASTROINTESTINAL ENDOSCOPY        General Information       Row Name 24 1036          OT Time and Intention    Document Type evaluation  -PG     Mode of Treatment individual therapy;occupational therapy  -PG       Row Name 24 1036          General  Information    Prior Level of Function independent:;transfer;ADL's  -PG     Existing Precautions/Restrictions no known precautions/restrictions  -PG     Barriers to Rehab none identified  -PG       Row Name 12/06/24 1036          Occupational Profile    Reason for Services/Referral (Occupational Profile) Patient is a 64-year-old male admitted for gastritis.  Patient is being evaluated by Occupational Therapy due to recent decline in ADL function.  No previous OT services identified  -PG       Row Name 12/06/24 1036          Living Environment    People in Home significant other  -PG       Row Name 12/06/24 1036          Cognition    Orientation Status (Cognition) oriented x 3  -PG               User Key  (r) = Recorded By, (t) = Taken By, (c) = Cosigned By      Initials Name Provider Type    PG Bora Vargas, OT Occupational Therapist                     Mobility/ADL's       Row Name 12/06/24 1037          Transfers    Transfers bed-chair transfer  -PG       Row Name 12/06/24 1037          Bed-Chair Transfer    Bed-Chair [place] (Transfers) independent  -PG       Row Name 12/06/24 1037          Activities of Daily Living    BADL Assessment/Intervention bathing;upper body dressing;lower body dressing;grooming;toileting  -PG       Row Name 12/06/24 1037          Bathing Assessment/Intervention    [place] Level (Bathing) bathing skills;independent  -PG       Row Name 12/06/24 1037          Upper Body Dressing Assessment/Training    [place] Level (Upper Body Dressing) upper body dressing skills;independent  -PG       Row Name 12/06/24 1037          Lower Body Dressing Assessment/Training    [place] Level (Lower Body Dressing) lower body dressing skills;independent  -PG       Row Name 12/06/24 1037          Grooming Assessment/Training    [place] Level (Grooming) grooming skills;independent  -PG       Row Name 12/06/24 1037          Toileting Assessment/Training    [place] Level (Toileting)  toileting skills;independent  -PG               User Key  (r) = Recorded By, (t) = Taken By, (c) = Cosigned By      Initials Name Provider Type    PG Bora Vargas OT Occupational Therapist                   Obj/Interventions       Row Name 12/06/24 1037          Sensory Assessment (Somatosensory)    Sensory Assessment (Somatosensory) sensation intact  -PG       [facility] Name 12/06/24 1037          Vision Assessment/Intervention    Visual Impairment/Limitations WFL  -PG       Row Name 12/06/24 1037          Range of Motion Comprehensive    General Range of Motion no range of motion deficits identified  -PG       Row Name 12/06/24 1037          Strength Comprehensive (MMT)    General Manual Muscle Testing (MMT) Assessment no strength deficits identified  -PG       Row Name 12/06/24 1037          Motor Skills    Motor Skills coordination;functional endurance  -PG     Coordination WFL  -PG     Functional Endurance good minus  -PG               User Key  (r) = Recorded By, (t) = Taken By, (c) = Cosigned By      Initials Name Provider Type    PG Bora Vargas OT Occupational Therapist                   Goals/Plan    No documentation.                  Clinical Impression       Row Name 12/06/24 1038          Pain Assessment    Pretreatment Pain Rating 0/10 - no pain  -PG     Posttreatment Pain Rating 0/10 - no pain  -PG       Row Name 12/06/24 1038          Plan of Care Review    Plan of Care Reviewed With patient  -PG     Progress improving  -PG     Outcome Evaluation OT evaluation completed with no deficits identified.  Patient is currently walking to and from the bathroom independently and reports no problems with self-care activities.  Upper extremity function is within normal limits.  Patient agreeable no additional OT services are necessary  -PG       Row Name 12/06/24 1038          Therapy Assessment/Plan (OT)    Criteria for Skilled Therapeutic Interventions Met (OT) no;no problems identified which require skilled  intervention  -PG     Therapy Frequency (OT) evaluation only  -PG       Row Name 12/06/24 1038          Therapy Plan Review/Discharge Plan (OT)    Anticipated Discharge Disposition (OT) home  -PG               User Key  (r) = Recorded By, (t) = Taken By, (c) = Cosigned By      Initials Name Provider Type    PG Bora Vargas, VENESSA Occupational Therapist                   Outcome Measures       Row Name 12/06/24 1038          How much help from another is currently needed...    Putting on and taking off regular lower body clothing? 4  -PG     Bathing (including washing, rinsing, and drying) 4  -PG     Toileting (which includes using toilet bed pan or urinal) 4  -PG     Putting on and taking off regular upper body clothing 4  -PG     Taking care of personal grooming (such as brushing teeth) 4  -PG     Eating meals 4  -PG     AM-PAC 6 Clicks Score (OT) 24  -PG       Row Name 12/06/24 0700          How much help from another person do you currently need...    Turning from your back to your side while in flat bed without using bedrails? 4  -JW     Moving from lying on back to sitting on the side of a flat bed without bedrails? 4  -JW     Moving to and from a bed to a chair (including a wheelchair)? 4  -JW     Standing up from a chair using your arms (e.g., wheelchair, bedside chair)? 4  -JW     Climbing 3-5 steps with a railing? 4  -JW     To walk in hospital room? 4  -JW     AM-PAC 6 Clicks Score (PT) 24  -JW       Row Name 12/06/24 1038          Functional Assessment    Outcome Measure Options Optimal Instrument;AM-PAC 6 Clicks Daily Activity (OT)  -PG       Row Name 12/06/24 1038          Optimal Instrument    Optimal Instrument Optimal - 3  -PG     Bending/Stooping 1  -PG     Standing 1  -PG     Reaching 1  -PG     From the list, choose the 3 activities you would most like to be able to do without any difficulty Bending/stooping;Standing;Reaching  -PG     Total Score Optimal - 3 3  -PG               User Key  (r) =  Recorded By, (t) = Taken By, (c) = Cosigned By      Initials Name Provider Type    PG Bora Vargas OT Occupational Therapist    JW Jovita Kelly RN Registered Nurse                    Occupational Therapy Education        No education to display                  OT Recommendation and Plan  Therapy Frequency (OT): evaluation only  Plan of Care Review  Plan of Care Reviewed With: patient  Progress: improving  Outcome Evaluation: OT evaluation completed with no deficits identified.  Patient is currently walking to and from the bathroom independently and reports no problems with self-care activities.  Upper extremity function is within normal limits.  Patient agreeable no additional OT services are necessary     Time Calculation:   Evaluation Complexity (OT)  Review Occupational Profile/Medical/Therapy History Complexity: brief/low complexity  Assessment, Occupational Performance/Identification of Deficit Complexity: 1-3 performance deficits  Clinical Decision Making Complexity (OT): problem focused assessment/low complexity  Overall Complexity of Evaluation (OT): low complexity     Time Calculation- OT       Row Name 12/06/24 1040             Time Calculation- OT    OT Received On 12/06/24  -PG         Untimed Charges    OT Eval/Re-eval Minutes 30  -PG         Total Minutes    Untimed Charges Total Minutes 30  -PG       Total Minutes 30  -PG                User Key  (r) = Recorded By, (t) = Taken By, (c) = Cosigned By      Initials Name Provider Type    PG Bora Vargas OT Occupational Therapist                  Therapy Charges for Today       Code Description Service Date Service Provider Modifiers Qty    [id number]  OT EVAL LOW COMPLEXITY 2 12/6/2024 Bora Vargas OT GO 1                 Bora Vargas OT  12/6/2024

## 2024-12-06 NOTE — PLAN OF CARE
Goal Outcome Evaluation:  Plan of Care Reviewed With: patient        Progress: no change  Outcome Evaluation: Pt presents with no physical limitations that impede his ability to return home independently or with assist from family as needed. Pt was encouraged to continue ambulating as tolerated while here at the hospital. He will be discharged from PT caseload.    Anticipated Discharge Disposition (PT): home

## 2024-12-06 NOTE — THERAPY EVALUATION
Acute Care - Physical Therapy Initial Evaluation   [facility]     Patient Name: Yoni Aleman  : 1960  MRN: [id number]  Today's Date: 2024      Visit Dx:     ICD-10-CM ICD-9-CM   1. Upper abdominal pain  R10.10 789.09   2. Acute gastritis, presence of bleeding unspecified, unspecified gastritis type  K29.00 535.00   3. Epigastric pain  R10.13 789.06   4. Dysphagia, oropharyngeal  R13.12 787.22   5. Difficulty walking  R26.2 719.7     Patient Active Problem List   Diagnosis    MRSA (methicillin resistant staph aureus) culture positive    Anemia    Sciatic nerve pain    Gastric ulcer    Gastro-esophageal reflux disease without esophagitis    Altered bowel habits    Urinary retention    Hx of blood clots    History of splenectomy    Chronic midline low back pain with bilateral sciatica    Epigastric pain    History of pulmonary embolism    Deep vein thrombosis (DVT) of left lower extremity    Benign prostatic hyperplasia with lower urinary tract symptoms    Dysuria    Upper abdominal pain    Gastritis     Past Medical History:   Diagnosis Date    Acid reflux     Anxiety     Deep vein blood clot of left lower extremity     Gastric ulcer     Insomnia     Sciatic nerve pain     Scoliosis      Past Surgical History:   Procedure Laterality Date    COLONOSCOPY N/A 2024    Procedure: COLONOSCOPY WITH HOT SNARE POLYPECTOMY;  Surgeon: Juno Anderson MD;  Location: [facility] ENDOSCOPY;  Service: Gastroenterology;  Laterality: N/A;  COLON POLYP    ENDOSCOPY N/A 2024    Procedure: ESOPHAGOGASTRODUODENOSCOPY WITH BIOPSIES;  Surgeon: Juno Anderson MD;  Location: [facility] ENDOSCOPY;  Service: Gastroenterology;  Laterality: N/A;  HIATAL HERNIA, BARRETTS ESOPHAGUS    SPLENECTOMY      UPPER GASTROINTESTINAL ENDOSCOPY       PT Assessment (Last 12 Hours)       PT Evaluation and Treatment       Row Name 24 1500          Physical Therapy Time and Intention    Subjective Information no complaints  -CS      Document Type evaluation  -CS     Mode of Treatment individual therapy;physical therapy  -CS     Patient Effort good  -CS       Row Name 12/06/24 1500          General Information    Patient Profile Reviewed yes  -CS     Patient Observations alert;cooperative;agree to therapy  -CS     Prior Level of Function independent:;all household mobility;gait;transfer;bed mobility;ADL's  -CS     Equipment Currently Used at Home none  Pt reports using no AD, no home O2, and usually stands to shower in step-over tub.  -CS     Existing Precautions/Restrictions no known precautions/restrictions  -CS     Barriers to Rehab none identified  -CS       Row Name 12/06/24 1500          Living Environment    Current Living Arrangements home  -CS     People in Home significant other  -CS     Primary Care Provided by self  -CS       Row Name 12/06/24 1500          Pain    Pretreatment Pain Rating 0/10 - no pain  -CS     Posttreatment Pain Rating 0/10 - no pain  -CS       Row Name 12/06/24 1500          Cognition    Orientation Status (Cognition) oriented x 3  -CS       Row Name 12/06/24 1500          Range of Motion Comprehensive    General Range of Motion no range of motion deficits identified  -CS       Row Name 12/06/24 1500          Strength Comprehensive (MMT)    General Manual Muscle Testing (MMT) Assessment no strength deficits identified  -CS       Row Name 12/06/24 1500          Bed Mobility    Bed Mobility bed mobility (all) activities  -CS     All Activities, Price (Bed Mobility) independent  -CS       Row Name 12/06/24 1500          Transfers    Comment, (Transfers) Patient completed all functional transfers independently without assistive device  -CS       Row Name 12/06/24 1500          Gait/Stairs (Locomotion)    Gait/Stairs Locomotion gait/ambulation independence  -CS     Price Level (Gait) independent  -CS     Assistive Device (Gait) other (see comments)  no AD  -CS     Patient was able to Ambulate yes  -CS      Distance in Feet (Gait) 200  -CS     Pattern (Gait) step-through  -CS       Row Name 12/06/24 1500          Safety Issues/Impairments Affecting Functional Mobility    Impairments Affecting Function (Mobility) --  not applicable  -CS       Row Name 12/06/24 1500          Balance    Balance Assessment standing dynamic balance  -CS     Dynamic Standing Balance independent  -CS     Position/Device Used, Standing Balance unsupported  -CS       Row Name 12/06/24 1500          Plan of Care Review    Plan of Care Reviewed With patient  -CS     Progress no change  -CS     Outcome Evaluation Pt presents with no physical limitations that impede his ability to return home independently or with assist from family as needed. Pt was encouraged to continue ambulating as tolerated while here at the hospital. He will be discharged from PT caseload.  -CS       Row Name 12/06/24 1500          Positioning and Restraints    Pre-Treatment Position in bed  -CS     Post Treatment Position bed  -CS     In Bed fowlers;call light within reach;encouraged to call for assist  -CS       Row Name 12/06/24 1500          Therapy Assessment/Plan (PT)    Criteria for Skilled Interventions Met (PT) no problems identified which require skilled intervention  -CS     Therapy Frequency (PT) evaluation only  -CS       Row Name 12/06/24 1500          PT Evaluation Complexity    History, PT Evaluation Complexity no personal factors and/or comorbidities  -CS     Examination of Body Systems (PT Eval Complexity) total of 4 or more elements  -CS     Clinical Presentation (PT Evaluation Complexity) stable  -CS     Clinical Decision Making (PT Evaluation Complexity) low complexity  -CS     Overall Complexity (PT Evaluation Complexity) low complexity  -CS       Row Name 12/06/24 1500          Therapy Plan Review/Discharge Plan (PT)    Therapy Plan Review (PT) evaluation/treatment results reviewed;patient  -CS       Row Name 12/06/24 1500          Physical Therapy  Goals    Problem Specific Goal Selection (PT) problem specific goal 1, PT  -CS       Row Name 12/06/24 1500          Problem Specific Goal 1 (PT)    Problem Specific Goal 1 (PT) Complete PT evaluation  -CS     Time Frame (Problem Specific Goal 1, PT) by discharge  -CS     Progress/Outcome (Problem Specific Goal 1, PT) goal met  -CS               User Key  (r) = Recorded By, (t) = Taken By, (c) = Cosigned By      Initials Name Provider Type    Sheryl Shah PT Physical Therapist                    Physical Therapy Education        No education to display                  PT Recommendation and Plan  Anticipated Discharge Disposition (PT): home  Therapy Frequency (PT): evaluation only  Plan of Care Reviewed With: patient  Progress: no change  Outcome Evaluation: Pt presents with no physical limitations that impede his ability to return home independently or with assist from family as needed. Pt was encouraged to continue ambulating as tolerated while here at the hospital. He will be discharged from PT caseload.   Outcome Measures       Row Name 12/06/24 1500             How much help from another person do you currently need...    Turning from your back to your side while in flat bed without using bedrails? 4  -CS      Moving from lying on back to sitting on the side of a flat bed without bedrails? 4  -CS      Moving to and from a bed to a chair (including a wheelchair)? 4  -CS      Standing up from a chair using your arms (e.g., wheelchair, bedside chair)? 4  -CS      Climbing 3-5 steps with a railing? 4  -CS      To walk in hospital room? 4  -CS      AM-PAC 6 Clicks Score (PT) 24  -CS         Functional Assessment    Outcome Measure Options AM-PAC 6 Clicks Basic Mobility (PT)  -CS                User Key  (r) = Recorded By, (t) = Taken By, (c) = Cosigned By      Initials Name Provider Type    Sheryl Shah PT Physical Therapist                     Time Calculation:    PT Charges       Row Name 12/06/24  1519             Time Calculation    PT Received On 12/06/24  -CS         Untimed Charges    PT Eval/Re-eval Minutes 25  -CS         Total Minutes    Untimed Charges Total Minutes 25  -CS       Total Minutes 25  -CS                User Key  (r) = Recorded By, (t) = Taken By, (c) = Cosigned By      Initials Name Provider Type    CS Sheryl De La Rosa, PT Physical Therapist                  Therapy Charges for Today       Code Description Service Date Service Provider Modifiers Qty    40105741178 HC PT EVAL LOW COMPLEXITY 2 12/6/2024 Sheryl De La Rosa PT GP 1            PT G-Codes  Outcome Measure Options: AM-PAC 6 Clicks Basic Mobility (PT)  AM-PAC 6 Clicks Score (PT): 24  AM-PAC 6 Clicks Score (OT): 24    Sheryl De La Rosa PT  12/6/2024

## 2024-12-06 NOTE — PROGRESS NOTES
Pulmonary / Critical Care Progress Note      Patient Name: Yoni Aleman  : 1960  MRN: [id number]  Attending:  Octavio Ybarra MD  Date of admission: 2024    Subjective   Subjective   Follow-up for dyspnea    Over past 24 hours:  On room air  Doing well with Symbicort  Getting first dose this morning  Has minimal dyspnea on exertion  No coughing or wheezing  No fevers or chills        Objective   Objective     Vitals:   Temp:  [97.9 °F (36.6 °C)-99.2 °F (37.3 °C)] 98.1 °F (36.7 °C)  Heart Rate:  [] 69  Resp:  [13-21] 16  BP: ()/(57-99) 105/64  Flow (L/min) (Oxygen Therapy):  [3] 3    Physical Exam   Vital Signs Reviewed   General:  WDWN, Alert, NAD.    HEENT:  PERRL, EOMI.  OP, nares clear  Chest:  good aeration, clear to auscultation bilaterally, tympanic to percussion bilaterally, no work of breathing noted  CV: RRR, no MGR, pulses 2+, equal.  Abd:  Soft, NT, ND, + BS, no HSM  EXT:  no clubbing, no cyanosis, no edema  Neuro:  A&Ox3, CN grossly intact, no focal deficits.  Skin: No rashes or lesions noted      Result Review    Result Review:  I have personally reviewed the results from the time of this admission to 2024 07:00 EST and agree with these findings:  [x]  Laboratory  [x]  Microbiology  [x]  Radiology  []  EKG/Telemetry   []  Cardiology/Vascular   []  Pathology  []  Old records  []  Other:  Most notable findings include:       Lab 24  0522 24  0508 24  1113 24  0502 24  1622   WBC 7.54 9.07 9.57 7.93 8.70   HEMOGLOBIN 13.3 13.6 13.5 13.9 14.0   HEMATOCRIT 40.1 41.8 40.9 41.5 41.6   PLATELETS 297 311 321 341 312   SODIUM 136 136 136 137 137   POTASSIUM 4.1 4.2 4.6 4.2 3.8   CHLORIDE 101 101 101 102 100   CO2 24.6 19.5* 24.2 23.3 26.1   BUN 22 19 13 13 10   CREATININE 0.95 1.06 0.90 0.89 0.96   GLUCOSE 111* 70 104* 110* 128*   CALCIUM 9.4 9.3 9.4 9.6 9.7   PHOSPHORUS 2.7 3.4 2.7  --   --    TOTAL PROTEIN  --   --  7.4 7.8 7.8   ALBUMIN  --   --  4.6  4.8 4.8   GLOBULIN  --   --  2.8 3.0 3.0     CT Angiogram Chest Pulmonary Embolism    Result Date: 12/4/2024  CT ANGIOGRAM CHEST PULMONARY EMBOLISM Date of Exam: 12/4/2024 12:35 PM EST Indication: soa shortness of breath. Comparison: CT abdomen pelvis 11/9/2022 Technique: Axial CT images were obtained of the chest after the uneventful intravenous administration of iodinated contrast utilizing pulmonary embolism protocol.  Reconstructed coronal and sagittal images were also obtained. Automated exposure control and iterative construction methods were used.   FINDINGS: Pulmonary Arteries: Given motion limitation there is no evidence of a filling defect through the segmental levels or suspected more distally. Lung volumes are relatively low with vascular crowding noted at the lung bases. The main pulmonary arteries appear normal in caliber. Mediastinum: Heart size appears within normal limits.. There is no suspicious pericardial effusion. The aorta and the origin of the great vessels demonstrate no definite acute abnormality. Limited imaging of the base of the neck is grossly unremarkable in appearance. The esophagus appears to be normal in caliber. No suspicious hilar or mediastinal adenopathy noted. Small lymph nodes are noted which are likely reactive Lungs/pleura: There is motion limitation. Lung volumes are low and there is basilar crowding and perihilar and dependent opacities favored represent atelectasis. There is no pleural effusion. Central airways appear patent. Upper Abdomen: Patient is status post splenectomy. Splenule noted within the left upper quadrant. Dedicated CT of the abdomen and pelvis is reported separately. Soft tissues/Bones: Wedge-shaped deformity of midthoracic vertebral body appears stable from radiograph from 11/6/2023. No acute osseous abnormality noted. Underlying scoliotic curvature noted of the spine.     Impression: 1. Given motion limitation no evidence of pulmonary embolus noted  through the segmental levels. 2. Low lung volume exam with dependent and parabronchial opacities noted. Findings are favored represent atelectasis. Pneumonia cannot be excluded in the correct clinical setting Findings of the abdomen/pelvis are reported separately Electronically Signed: Emil Quintana MD  12/4/2024 1:17 PM EST  Workstation ID: [id number]     Cultures so far negative    Assessment & Plan   Assessment / Plan     Active Hospital Problems:  Active Hospital Problems    Diagnosis     **Upper abdominal pain     Gastritis     Epigastric pain        Impression:  Acute dyspnea  Question reactive airway disease  Inhalational exposure to mold  Peripheral eosinophilia  Question of mild intermittent asthma  Gastritis  Tobacco use of cigarettes in remission     Plan:  Patient's clinical presentation is consistent with reactive airway disease due to inhalational exposure to mold.  Patient could possibly have intermittent asthma as well.  Patient does have some peripheral eosinophilia which supports this.  IgE, fungal serologies, galactomannan, beta D glucan pending  Recommend discharging on Symbicort 160/4.5, 2 puffs twice a day.  Continue albuterol as needed  Will need outpatient follow-up with us and PFTs  No desaturations on overnight oximetry on room air.  Sees a sleep doctor and failed home sleep study.    EGD per GI  Encourage activity and incentive spirometer use    From a pulmonary perspective he is stable to discharge  See us in the office as an outpatient    VTE Prophylaxis:  Mechanical VTE prophylaxis orders are present.    CODE STATUS:   Level Of Support Discussed With: Patient  Code Status (Patient has no pulse and is not breathing): CPR (Attempt to Resuscitate)  Medical Interventions (Patient has pulse or is breathing): Full Support      Labs, imaging, microbiology, notes and medications personally reviewed  Discussed with primary    I will sign off.  Please call with questions.    Electronically  signed by Daniel Fisher MD, 12/06/24, 2:25 PM EST.

## 2024-12-06 NOTE — PLAN OF CARE
Goal Outcome Evaluation:  Plan of Care Reviewed With: patient           Outcome Evaluation: Patient still having some abdominal pain at times and PRN medications given this evening. Patient 's diet was advanced to regular tolerated fairly well. VSS and patient on continuous pulse ox with no prolonged desaturation. Will continue to follow care plans and monitor.
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
## 2024-12-07 PROCEDURE — G0378 HOSPITAL OBSERVATION PER HR: HCPCS

## 2024-12-07 PROCEDURE — 25010000002 ONDANSETRON PER 1 MG: Performed by: INTERNAL MEDICINE

## 2024-12-07 PROCEDURE — 99232 SBSQ HOSP IP/OBS MODERATE 35: CPT | Performed by: INTERNAL MEDICINE

## 2024-12-07 PROCEDURE — 94799 UNLISTED PULMONARY SVC/PX: CPT

## 2024-12-07 PROCEDURE — 94664 DEMO&/EVAL PT USE INHALER: CPT

## 2024-12-07 RX ORDER — DULOXETIN HYDROCHLORIDE 30 MG/1
30 CAPSULE, DELAYED RELEASE ORAL DAILY
Status: DISCONTINUED | OUTPATIENT
Start: 2024-12-07 | End: 2024-12-09 | Stop reason: HOSPADM

## 2024-12-07 RX ORDER — HYDROXYZINE HYDROCHLORIDE 25 MG/1
25 TABLET, FILM COATED ORAL 3 TIMES DAILY PRN
Status: DISCONTINUED | OUTPATIENT
Start: 2024-12-07 | End: 2024-12-09 | Stop reason: HOSPADM

## 2024-12-07 RX ORDER — TRAZODONE HYDROCHLORIDE 100 MG/1
100 TABLET ORAL NIGHTLY
Status: DISCONTINUED | OUTPATIENT
Start: 2024-12-07 | End: 2024-12-09 | Stop reason: HOSPADM

## 2024-12-07 RX ADMIN — FINASTERIDE 5 MG: 5 TABLET, FILM COATED ORAL at 09:12

## 2024-12-07 RX ADMIN — TRAZODONE HYDROCHLORIDE 100 MG: 100 TABLET ORAL at 22:53

## 2024-12-07 RX ADMIN — BUDESONIDE AND FORMOTEROL FUMARATE DIHYDRATE 2 PUFF: 160; 4.5 AEROSOL RESPIRATORY (INHALATION) at 18:33

## 2024-12-07 RX ADMIN — TAMSULOSIN HYDROCHLORIDE 0.4 MG: 0.4 CAPSULE ORAL at 09:12

## 2024-12-07 RX ADMIN — Medication 10 ML: at 22:54

## 2024-12-07 RX ADMIN — ONDANSETRON 4 MG: 2 INJECTION INTRAMUSCULAR; INTRAVENOUS at 09:12

## 2024-12-07 RX ADMIN — ALPRAZOLAM 0.25 MG: 0.25 TABLET ORAL at 09:18

## 2024-12-07 RX ADMIN — CYANOCOBALAMIN TAB 500 MCG 1000 MCG: 500 TAB at 09:12

## 2024-12-07 RX ADMIN — BUDESONIDE AND FORMOTEROL FUMARATE DIHYDRATE 2 PUFF: 160; 4.5 AEROSOL RESPIRATORY (INHALATION) at 09:40

## 2024-12-07 RX ADMIN — SENNOSIDES AND DOCUSATE SODIUM 2 TABLET: 50; 8.6 TABLET ORAL at 09:12

## 2024-12-07 RX ADMIN — DULOXETINE HYDROCHLORIDE 30 MG: 30 CAPSULE, DELAYED RELEASE ORAL at 18:36

## 2024-12-07 RX ADMIN — ALPRAZOLAM 0.25 MG: 0.25 TABLET ORAL at 22:53

## 2024-12-07 RX ADMIN — SENNOSIDES AND DOCUSATE SODIUM 2 TABLET: 50; 8.6 TABLET ORAL at 22:53

## 2024-12-07 RX ADMIN — Medication 1 TABLET: at 09:12

## 2024-12-07 RX ADMIN — Medication 10 ML: at 09:14

## 2024-12-07 RX ADMIN — PANTOPRAZOLE SODIUM 40 MG: 40 TABLET, DELAYED RELEASE ORAL at 05:58
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
## 2024-12-07 NOTE — PLAN OF CARE
Goal Outcome Evaluation:  Plan of Care Reviewed With: patient        Progress: no change     No changes overnight. Patient states he feels anxious and wants to have a psychiatrist see him while he is here. Given prn xanax x1 and effective per patient. Able to sleep most of the night.

## 2024-12-07 NOTE — PLAN OF CARE
Problem: Adult Inpatient Plan of Care  Goal: Plan of Care Review  Outcome: Progressing  Flowsheets (Taken 12/7/2024 1705)  Progress: no change  Outcome Evaluation: vss. gave xanax this am for anixety. gave zofran x1. no new changes at this time.  Plan of Care Reviewed With: patient  Goal: Patient-Specific Goal (Individualized)  Outcome: Progressing  Goal: Absence of Hospital-Acquired Illness or Injury  Outcome: Progressing  Intervention: Identify and Manage Fall Risk  Recent Flowsheet Documentation  Taken 12/7/2024 1704 by Colton Forrest, RN  Safety Promotion/Fall Prevention:   fall prevention program maintained   nonskid shoes/slippers when out of bed   room organization consistent   safety round/check completed   assistive device/personal items within reach   lighting adjusted  Taken 12/7/2024 1536 by Colton Forrest, SKY  Safety Promotion/Fall Prevention:   fall prevention program maintained   nonskid shoes/slippers when out of bed   room organization consistent   safety round/check completed   assistive device/personal items within reach   lighting adjusted  Taken 12/7/2024 1329 by Colton Forrest RN  Safety Promotion/Fall Prevention:   fall prevention program maintained   nonskid shoes/slippers when out of bed   room organization consistent   safety round/check completed   assistive device/personal items within reach   lighting adjusted  Taken 12/7/2024 1125 by Colton Forrest RN  Safety Promotion/Fall Prevention:   clutter free environment maintained   assistive device/personal items within reach   room organization consistent   safety round/check completed  Taken 12/7/2024 0913 by Colton Forrest, SKY  Safety Promotion/Fall Prevention: safety round/check completed  Taken 12/7/2024 0912 by Colton Forrest, SKY  Safety Promotion/Fall Prevention:   fall prevention program maintained   nonskid shoes/slippers when out of bed   room organization consistent   safety round/check completed   assistive device/personal items  within reach   lighting adjusted  Intervention: Prevent Infection  Recent Flowsheet Documentation  Taken 12/7/2024 0913 by Colton Forrest RN  Infection Prevention: hand hygiene promoted  Goal: Optimal Comfort and Wellbeing  Outcome: Progressing  Goal: Readiness for Transition of Care  Outcome: Progressing     Problem: Comorbidity Management  Goal: Blood Pressure in Desired Range  Outcome: Progressing     Problem: Fall Injury Risk  Goal: Absence of Fall and Fall-Related Injury  Outcome: Progressing  Intervention: Promote Injury-Free Environment  Recent Flowsheet Documentation  Taken 12/7/2024 1704 by Colton Forrest RN  Safety Promotion/Fall Prevention:   fall prevention program maintained   nonskid shoes/slippers when out of bed   room organization consistent   safety round/check completed   assistive device/personal items within reach   lighting adjusted  Taken 12/7/2024 1536 by Colton Forrest RN  Safety Promotion/Fall Prevention:   fall prevention program maintained   nonskid shoes/slippers when out of bed   room organization consistent   safety round/check completed   assistive device/personal items within reach   lighting adjusted  Taken 12/7/2024 1329 by Colton Forrest RN  Safety Promotion/Fall Prevention:   fall prevention program maintained   nonskid shoes/slippers when out of bed   room organization consistent   safety round/check completed   assistive device/personal items within reach   lighting adjusted  Taken 12/7/2024 1125 by Colton Forrest RN  Safety Promotion/Fall Prevention:   clutter free environment maintained   assistive device/personal items within reach   room organization consistent   safety round/check completed  Taken 12/7/2024 0913 by Colton Forrest RN  Safety Promotion/Fall Prevention: safety round/check completed  Taken 12/7/2024 0912 by Colton Forrest RN  Safety Promotion/Fall Prevention:   fall prevention program maintained   nonskid shoes/slippers when out of bed   room organization  consistent   safety round/check completed   assistive device/personal items within reach   lighting adjusted   Goal Outcome Evaluation:  Plan of Care Reviewed With: patient        Progress: no change  Outcome Evaluation: vss. gave xanax this am for anixety. gave zofran x1. no new changes at this time.

## 2024-12-08 VITALS
HEART RATE: 80 BPM | BODY MASS INDEX: 22.96 KG/M2 | RESPIRATION RATE: 18 BRPM | HEIGHT: 69 IN | SYSTOLIC BLOOD PRESSURE: 117 MMHG | TEMPERATURE: 98.2 F | OXYGEN SATURATION: 91 % | DIASTOLIC BLOOD PRESSURE: 72 MMHG | WEIGHT: 154.98 LBS

## 2024-12-08 LAB
A FLAVUS AB SER QL ID: NEGATIVE
A FUMIGATUS AB SER QL ID: NEGATIVE
A FUMIGATUS IGE QN: <0.1 KU/L
A NIGER AB SER QL ID: NEGATIVE
ANION GAP SERPL CALCULATED.3IONS-SCNC: 10.5 MMOL/L (ref 5–15)
BUN SERPL-MCNC: 18 MG/DL (ref 8–23)
BUN/CREAT SERPL: 20.7 (ref 7–25)
CALCIUM SPEC-SCNC: 9 MG/DL (ref 8.6–10.5)
CHLORIDE SERPL-SCNC: 102 MMOL/L (ref 98–107)
CO2 SERPL-SCNC: 24.5 MMOL/L (ref 22–29)
CREAT SERPL-MCNC: 0.87 MG/DL (ref 0.76–1.27)
EGFRCR SERPLBLD CKD-EPI 2021: 97 ML/MIN/1.73
GLUCOSE SERPL-MCNC: 122 MG/DL (ref 65–99)
POTASSIUM SERPL-SCNC: 4.5 MMOL/L (ref 3.5–5.2)
SODIUM SERPL-SCNC: 137 MMOL/L (ref 136–145)
WHOLE BLOOD HOLD SPECIMEN: NORMAL

## 2024-12-08 PROCEDURE — 94664 DEMO&/EVAL PT USE INHALER: CPT

## 2024-12-08 PROCEDURE — 80048 BASIC METABOLIC PNL TOTAL CA: CPT | Performed by: INTERNAL MEDICINE

## 2024-12-08 PROCEDURE — 94799 UNLISTED PULMONARY SVC/PX: CPT

## 2024-12-08 PROCEDURE — 99239 HOSP IP/OBS DSCHRG MGMT >30: CPT | Performed by: INTERNAL MEDICINE

## 2024-12-08 PROCEDURE — 94761 N-INVAS EAR/PLS OXIMETRY MLT: CPT

## 2024-12-08 PROCEDURE — G0378 HOSPITAL OBSERVATION PER HR: HCPCS

## 2024-12-08 RX ORDER — ALBUTEROL SULFATE 90 UG/1
2 INHALANT RESPIRATORY (INHALATION) EVERY 4 HOURS PRN
Qty: 18 G | Refills: 0 | Status: SHIPPED | OUTPATIENT
Start: 2024-12-08 | End: 2025-01-07

## 2024-12-08 RX ORDER — MOMETASONE FUROATE AND FORMOTEROL FUMARATE DIHYDRATE 100; 5 UG/1; UG/1
2 AEROSOL RESPIRATORY (INHALATION)
Qty: 13 G | Refills: 0 | Status: SHIPPED | OUTPATIENT
Start: 2024-12-08 | End: 2025-01-07

## 2024-12-08 RX ORDER — DULOXETIN HYDROCHLORIDE 30 MG/1
30 CAPSULE, DELAYED RELEASE ORAL DAILY
Qty: 30 CAPSULE | Refills: 0 | Status: SHIPPED | OUTPATIENT
Start: 2024-12-09 | End: 2025-01-08

## 2024-12-08 RX ORDER — TRAZODONE HYDROCHLORIDE 100 MG/1
100 TABLET ORAL NIGHTLY
Qty: 30 TABLET | Refills: 0 | Status: SHIPPED | OUTPATIENT
Start: 2024-12-08 | End: 2025-01-07

## 2024-12-08 RX ADMIN — Medication 10 ML: at 08:56

## 2024-12-08 RX ADMIN — CYANOCOBALAMIN TAB 500 MCG 1000 MCG: 500 TAB at 08:55

## 2024-12-08 RX ADMIN — DULOXETINE HYDROCHLORIDE 30 MG: 30 CAPSULE, DELAYED RELEASE ORAL at 08:55

## 2024-12-08 RX ADMIN — SENNOSIDES AND DOCUSATE SODIUM 2 TABLET: 50; 8.6 TABLET ORAL at 08:55

## 2024-12-08 RX ADMIN — Medication 1 TABLET: at 08:55

## 2024-12-08 RX ADMIN — PANTOPRAZOLE SODIUM 40 MG: 40 TABLET, DELAYED RELEASE ORAL at 05:09

## 2024-12-08 RX ADMIN — FINASTERIDE 5 MG: 5 TABLET, FILM COATED ORAL at 08:55

## 2024-12-08 RX ADMIN — TAMSULOSIN HYDROCHLORIDE 0.4 MG: 0.4 CAPSULE ORAL at 09:19

## 2024-12-08 RX ADMIN — BUDESONIDE AND FORMOTEROL FUMARATE DIHYDRATE 2 PUFF: 160; 4.5 AEROSOL RESPIRATORY (INHALATION) at 09:46

## 2024-12-08 NOTE — PLAN OF CARE
Problem: Adult Inpatient Plan of Care  Goal: Plan of Care Review  Outcome: Met  Goal: Patient-Specific Goal (Individualized)  Outcome: Met  Goal: Absence of Hospital-Acquired Illness or Injury  Outcome: Met  Intervention: Identify and Manage Fall Risk  Recent Flowsheet Documentation  Taken 12/8/2024 1142 by Colton Forrest RN  Safety Promotion/Fall Prevention:   fall prevention program maintained   nonskid shoes/slippers when out of bed   room organization consistent   safety round/check completed   assistive device/personal items within reach   lighting adjusted  Taken 12/8/2024 0947 by Colton Forrest RN  Safety Promotion/Fall Prevention:   fall prevention program maintained   nonskid shoes/slippers when out of bed   room organization consistent   safety round/check completed   assistive device/personal items within reach   lighting adjusted  Taken 12/8/2024 0855 by Colton Forrest RN  Safety Promotion/Fall Prevention: safety round/check completed  Taken 12/8/2024 0745 by Colton Forrest RN  Safety Promotion/Fall Prevention:   clutter free environment maintained   assistive device/personal items within reach   room organization consistent   safety round/check completed  Goal: Optimal Comfort and Wellbeing  Outcome: Met  Goal: Readiness for Transition of Care  Outcome: Met     Problem: Comorbidity Management  Goal: Blood Pressure in Desired Range  Outcome: Met     Problem: Fall Injury Risk  Goal: Absence of Fall and Fall-Related Injury  Outcome: Met  Intervention: Promote Injury-Free Environment  Recent Flowsheet Documentation  Taken 12/8/2024 1142 by Colton Forrest RN  Safety Promotion/Fall Prevention:   fall prevention program maintained   nonskid shoes/slippers when out of bed   room organization consistent   safety round/check completed   assistive device/personal items within reach   lighting adjusted  Taken 12/8/2024 0947 by Colton Forrest RN  Safety Promotion/Fall Prevention:   fall prevention program  maintained   nonskid shoes/slippers when out of bed   room organization consistent   safety round/check completed   assistive device/personal items within reach   lighting adjusted  Taken 12/8/2024 0855 by Colton Forrest, RN  Safety Promotion/Fall Prevention: safety round/check completed  Taken 12/8/2024 0745 by Colton Forrest, RN  Safety Promotion/Fall Prevention:   clutter free environment maintained   assistive device/personal items within reach   room organization consistent   safety round/check completed   Goal Outcome Evaluation:  Plan of Care Reviewed With: patient        Progress: no change  Outcome Evaluation: vss. gave xanax this am for anixety. gave zofran x1. no new changes at this time.

## 2024-12-08 NOTE — PLAN OF CARE
"Goal Outcome Evaluation:              Outcome Evaluation: VSS. Patient slept well, no c/o pain above baseline. Patient described having upper abdominal pain chronically described as sore and thumping, as well as chronic leg pain, described as his legs becoming \"frozen\" due to sciatic nerve pain.                             "
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
## 2024-12-09 ENCOUNTER — TRANSITIONAL CARE MANAGEMENT TELEPHONE ENCOUNTER (OUTPATIENT)
Dept: CALL CENTER | Facility: HOSPITAL | Age: 64
End: 2024-12-09
Payer: COMMERCIAL

## 2024-12-09 ENCOUNTER — READMISSION MANAGEMENT (OUTPATIENT)
Dept: CALL CENTER | Facility: HOSPITAL | Age: 64
End: 2024-12-09
Payer: COMMERCIAL

## 2024-12-09 ENCOUNTER — TELEPHONE (OUTPATIENT)
Dept: GASTROENTEROLOGY | Facility: CLINIC | Age: 64
End: 2024-12-09
Payer: COMMERCIAL

## 2024-12-09 LAB
CYTO UR: NORMAL
DPYD GENE MUT ANL BLD/T: NEGATIVE
FUNGITELL VALUE: <31.25 PG/ML
GALACTOMANNAN AG SPEC IA-ACNC: 0.04 INDEX (ref 0–0.49)
IMP & REVIEW OF LAB RESULTS: NORMAL
LAB AP CASE REPORT: NORMAL
LAB AP CLINICAL INFORMATION: NORMAL
Lab: NORMAL
Lab: NORMAL
PATH REPORT.FINAL DX SPEC: NORMAL
PATH REPORT.GROSS SPEC: NORMAL
PATHOLOGIST NAME: NORMAL
REFERENCE VALUE: NORMAL

## 2024-12-09 NOTE — OUTREACH NOTE
Prep Survey      Flowsheet Row Responses   [facility] patient discharged from? [facility]   Is LACE score < 7 ? No   Eligibility [facility]   Date of Admission 12/04/24   Date of Discharge 12/08/24   Discharge diagnosis Upper abdominal pain   Does the patient have one of the following disease processes/diagnoses(primary or secondary)? Other   Does the patient have Home health ordered? No   Is there a DME ordered? No   Prep survey completed? Yes            Edith HOWARD - Registered Nurse

## 2024-12-09 NOTE — OUTREACH NOTE
"Call Center TCM Note      Flowsheet Row Responses   [facility] patient discharged from? [facility]   Does the patient have one of the following disease processes/diagnoses(primary or secondary)? Other   TCM attempt successful? Yes  [VR for KARY Rain]   Call start time 1030   Call end time 1039   Discharge diagnosis Upper abdominal pain   Meds reviewed with patient/caregiver? Yes   Is the patient having any side effects they believe may be caused by any medication additions or changes? No   Does the patient have all medications ordered at discharge? Yes   Prescription comments pt has albuterol but not dulera as not available at his pharmacy, reports his SO working on this issue.  Pt taking cymbalta and trazadone as ordered   Is the patient taking all medications as directed (includes completed medication regime)? Yes   Does the patient have an appointment with their PCP within 7-14 days of discharge? No appointments available   Nursing Interventions Routed TCM call to PCP office   Has home health visited the patient within 72 hours of discharge? N/A   Psychosocial issues? Yes  [Provided 988# to pt--denies at time of call]   Psychosocial comments pt discusses recent death of sister   Did the patient receive a copy of their discharge instructions? Yes   Nursing interventions Reviewed instructions with patient   What is the patient's perception of their health status since discharge? Improving  [Pt reports he is doing better and at present time has \"very little anxiety.\" Spent time with pt discussing anxiety, triggers, medications, diversion, etc.  Pt is going to f/u with [facility] again.]   Is the patient/caregiver able to teach back signs and symptoms related to disease process for when to call PCP? Yes   Is the patient/caregiver able to teach back signs and symptoms related to disease process for when to call 911? Yes   Additional teach back comments Pt is aware to seek care for emergent s/s--reports he " presented to ED for abdominal, chest pain and completed diagnostics.  REviewed cardiac s/s and need to seek immediate care.   TCM call completed? Yes   Call end time 1033            Tessy Mckeon RN    12/9/2024, 10:44 EST

## 2024-12-09 NOTE — TELEPHONE ENCOUNTER
----- Message from Lina Ramos sent at 12/9/2024 12:43 PM EST -----  Mesa's esophagus noted at the GE junction repeat EGD in 1 year.  Ensure that the patient is scheduled for CTA of the abdomen and pelvis.

## 2024-12-11 ENCOUNTER — TELEPHONE (OUTPATIENT)
Dept: GASTROENTEROLOGY | Facility: CLINIC | Age: 64
End: 2024-12-11
Payer: COMMERCIAL

## 2024-12-11 LAB — IGE SERPL-ACNC: 108 IU/ML (ref 6–495)

## 2024-12-11 NOTE — TELEPHONE ENCOUNTER
----- Message from Nirmargo Sarahumesh sent at 12/9/2024  1:41 PM EST -----  Patient had EGD with Dr. Anderson on 12/5/2024  Findings included-  Small hiatal hernia.  Esophageal mucosal changes consistent with short segment Mesa's esophagus.  Biopsied.  Normal stomach.  Biopsied.  Normal duodenum.    Stomach biopsy  Path-mild reactive gastropathy  Negative for H. pylori, intestinal metaplasia, dysplasia, and malignancy    GE junction biopsy  Columnar mucosa with intestinal metaplasia  Negative for dysplasia and malignancy    Patient needs to continue Protonix 40 mg p.o. daily and will need repeat EGD in 2 to 3 years for evaluation of known diagnosis of Mesa's esophagus

## 2024-12-11 NOTE — TELEPHONE ENCOUNTER
NATALIE Machado has reviewed EGD and Abilio KWAN MA is working to provide results/recommendations.  (See TE dated 12.09.24.)    Patient was discharged on omeprazole 40 mg daily.    EGD recall has been placed for repeat in 1 year for Mesa's Esophagus.

## 2024-12-12 NOTE — TELEPHONE ENCOUNTER
Left vm calling for results.  Went ahead and scheduled a office f/u appt with Dr Anderson on 2.11.2025. will print appt and mail to xochilt connors

## 2024-12-18 ENCOUNTER — READMISSION MANAGEMENT (OUTPATIENT)
Dept: CALL CENTER | Facility: HOSPITAL | Age: 64
End: 2024-12-18
Payer: COMMERCIAL

## 2024-12-18 NOTE — OUTREACH NOTE
Medical Week 2 Survey      Flowsheet Row Responses   [facility] patient discharged from? [facility]   Does the patient have one of the following disease processes/diagnoses(primary or secondary)? Other   Week 2 attempt successful? Yes   Call start time 1031   Call end time 1037   List who call center can speak with pt   Meds reviewed with patient/caregiver? Yes   Is the patient taking all medications as directed (includes completed medication regime)? Yes   Comments regarding appointments Pt waiting on return call from pcp office for appointment.   Does the patient have a primary care provider?  Yes   Has the patient kept scheduled appointments due by today? N/A   What is the patient's perception of their health status since discharge? Improving   Week 2 Call Completed? Yes   Graduated Yes   Graduated/Revoked comments Pt reports improving. Pt has all medications. Pt waiting on return call from pcp office for f/u appointment.   Call end time 1037            Brooke RODRIGUEZ - Registered Nurse

## 2024-12-20 ENCOUNTER — OFFICE VISIT (OUTPATIENT)
Dept: FAMILY MEDICINE CLINIC | Facility: CLINIC | Age: 64
End: 2024-12-20
Payer: COMMERCIAL

## 2024-12-20 VITALS
DIASTOLIC BLOOD PRESSURE: 90 MMHG | BODY MASS INDEX: 22.76 KG/M2 | SYSTOLIC BLOOD PRESSURE: 160 MMHG | OXYGEN SATURATION: 96 % | HEART RATE: 81 BPM | WEIGHT: 154.1 LBS | TEMPERATURE: 98.2 F

## 2024-12-20 DIAGNOSIS — R06.09 DYSPNEA ON EXERTION: ICD-10-CM

## 2024-12-20 DIAGNOSIS — F32.A ANXIETY AND DEPRESSION: ICD-10-CM

## 2024-12-20 DIAGNOSIS — K21.9 GASTRO-ESOPHAGEAL REFLUX DISEASE WITHOUT ESOPHAGITIS: ICD-10-CM

## 2024-12-20 DIAGNOSIS — F41.9 ANXIETY AND DEPRESSION: ICD-10-CM

## 2024-12-20 DIAGNOSIS — Z09 HOSPITAL DISCHARGE FOLLOW-UP: Primary | ICD-10-CM

## 2024-12-20 RX ORDER — L.ACID,CASEI/B.ANIMAL/S.THERMO 16B CELL
1 CAPSULE ORAL DAILY
Qty: 90 CAPSULE | Refills: 1 | Status: SHIPPED | OUTPATIENT
Start: 2024-12-20 | End: 2025-06-18

## 2024-12-20 RX ORDER — MOMETASONE FUROATE AND FORMOTEROL FUMARATE DIHYDRATE 100; 5 UG/1; UG/1
2 AEROSOL RESPIRATORY (INHALATION)
Qty: 13 G | Refills: 2 | Status: SHIPPED | OUTPATIENT
Start: 2024-12-20 | End: 2025-03-20

## 2024-12-20 RX ORDER — ALBUTEROL SULFATE 90 UG/1
2 INHALANT RESPIRATORY (INHALATION) EVERY 4 HOURS PRN
Qty: 18 G | Refills: 2 | Status: SHIPPED | OUTPATIENT
Start: 2024-12-20 | End: 2025-01-19

## 2024-12-20 RX ORDER — OMEPRAZOLE 40 MG/1
40 CAPSULE, DELAYED RELEASE ORAL DAILY
Qty: 90 CAPSULE | Refills: 1 | Status: SHIPPED | OUTPATIENT
Start: 2024-12-20 | End: 2025-06-18

## 2024-12-20 RX ORDER — TRAZODONE HYDROCHLORIDE 100 MG/1
100 TABLET ORAL NIGHTLY
Qty: 30 TABLET | Refills: 2 | Status: SHIPPED | OUTPATIENT
Start: 2024-12-20 | End: 2025-03-20

## 2024-12-20 RX ORDER — DULOXETIN HYDROCHLORIDE 30 MG/1
30 CAPSULE, DELAYED RELEASE ORAL DAILY
Qty: 30 CAPSULE | Refills: 2 | Status: SHIPPED | OUTPATIENT
Start: 2024-12-20 | End: 2025-03-20

## 2024-12-20 NOTE — ASSESSMENT & PLAN NOTE
Refilled medications. He has follow up with GI planned for February.  Orders:    omeprazole (priLOSEC) 40 MG capsule; Take 1 capsule by mouth Daily for 180 days.    Probiotic Product (Risaquad-2) capsule capsule; Take 1 capsule by mouth Daily for 180 days.

## 2024-12-20 NOTE — PROGRESS NOTES
Chief Complaint  Hospital Follow Up Visit (12-4-24 [facility])    Subjective      Yoni Aleman is a 64 y.o. male who presents to [facility]     Yoni Aleman is a 64 y.o. male who presents for a transitional care management visit.    Within 48 business hours after discharge our office contacted him via telephone to coordinate his care and needs.      I reviewed and discussed the details of that call along with the discharge summary, hospital problems, inpatient lab results, inpatient diagnostic studies, and consultation reports with Yoni.     Current outpatient and discharge medications have been reconciled for the patient.  Reviewed by: Rodrick Saldana MD          12/9/2024     4:08 AM   Date of TCM Phone Call   [facility]   Date of Admission 12/4/2024   Date of Discharge 12/8/2024     Risk for Readmission (LACE) Score: 7 (12/8/2024  6:00 AM)      History of Present Illness   Course During Hospital Stay: Admitted to [facility] from 12/4/2024 to 12/8/2024.  Presented to the ER with abdominal chest discomfort for 2 weeks that had progressively gotten worse.  Also pills of gotten stuck in his throat at times.  Known hiatal hernia and Mesa's esophagus history as well.  CT abdomen in the ER showed gastritis.  Patient was seen by GI.  EGD on December 5 showed a small hiatal hernia and Mesa's esophagus otherwise normal stomach and duodenum.  Patient was also seen by pulmonary due to dyspnea on exertion and they thought he had reactive airway disease to mold exposure and was started on Symbicort inhaler.  Extensive GI workup was overall negative and so they advised that many of the symptoms were likely due to anxiety.  He was seen by psych and started on duloxetine and trazodone.  It sounds like he has had the symptoms going on for several years and he had been extensively worked up at [facility] previously.  He was told to follow-up with PCP, pulmonary, GI,  and [facility] after discharge.    GI plans to do an EGD repeat in 2-3 years.  Follow-up with GI clinic scheduled for 2/11/2025. Yoni says they want to do a hiatal hernia operation, but I don't see this in the notes.    He does not have a pending [facility] appointment. He hasn't tried calling them yet. He does have their number but didn't realize he needed to call for an appointment.    Chronic sciatic nerve pain. Been dealing with this for 20+ years.    Objective   Vital Signs:   Vitals:    12/20/24 1318   BP: 160/90   Pulse: 81   Temp: 98.2 °F (36.8 °C)   SpO2: 96%   Weight: 69.9 kg (154 lb 1.6 oz)   PainSc:   2   PainLoc: Leg     Body mass index is 22.76 kg/m².    Wt Readings from Last 3 Encounters:   12/20/24 69.9 kg (154 lb 1.6 oz)   12/04/24 70.3 kg (154 lb 15.7 oz)   12/04/24 68.4 kg (150 lb 12.7 oz)     BP Readings from Last 3 Encounters:   12/20/24 160/90   12/08/24 117/72   12/04/24 120/86       Health Maintenance   Topic Date Due    ZOSTER VACCINE (1 of 2) Never done    ANNUAL PHYSICAL  11/12/2025    GASTROSCOPY (EGD)  12/05/2025    COLORECTAL CANCER SCREENING  01/31/2027    TDAP/TD VACCINES (2 - Td or Tdap) 02/05/2027    HEPATITIS C SCREENING  Completed    COVID-19 Vaccine  Completed    Pneumococcal Vaccine 0-64  Completed    INFLUENZA VACCINE  Completed       Physical Exam  Vitals and nursing note reviewed.   Constitutional:       General: He is not in acute distress.  Cardiovascular:      Rate and Rhythm: Normal rate and regular rhythm.      Heart sounds: Normal heart sounds.   Pulmonary:      Effort: Pulmonary effort is normal. No respiratory distress.      Breath sounds: Normal breath sounds.   Neurological:      Mental Status: He is alert.   Psychiatric:         Mood and Affect: Mood normal.         Behavior: Behavior normal.          Result Review :  The following data was reviewed by: Rodrick Saldana MD on 12/20/2024:         Procedures          Assessment & Plan  Hospital discharge  follow-up  Reviewed recent hospitalization and upcoming appointments.       Gastro-esophageal reflux disease without esophagitis  Refilled medications. He has follow up with GI planned for February.  Orders:    omeprazole (priLOSEC) 40 MG capsule; Take 1 capsule by mouth Daily for 180 days.    Probiotic Product (Risaquad-2) capsule capsule; Take 1 capsule by mouth Daily for 180 days.    Anxiety and depression  Refilled medications. [facility]    Orders:    DULoxetine (CYMBALTA) 30 MG capsule; Take 1 capsule by mouth Daily for 90 days.    traZODone (DESYREL) 100 MG tablet; Take 1 tablet by mouth Every Night for 90 days.    Dyspnea on exertion  Continues on inhalers. Refilled today.    Orders:    mometasone-formoterol (Dulera) 100-5 MCG/ACT inhaler; Inhale 2 puffs 2 (Two) Times a Day for 90 days.    albuterol sulfate  (90 Base) MCG/ACT inhaler; Inhale 2 puffs Every 4 (Four) Hours As Needed for Wheezing or Shortness of Air for up to 30 days.         BMI is within normal parameters. No other follow-up for BMI required.         FOLLOW UP  Return if symptoms worsen or fail to improve.  Patient was given instructions and counseling regarding his condition or for health maintenance advice. Please see specific information pulled into the AVS if appropriate.       Rodrick Saldana MD  12/20/24  13:34 EST    CURRENT & DISCONTINUED MEDICATIONS  Current Outpatient Medications   Medication Instructions    acetaminophen (TYLENOL) 500 mg, Oral, Every 6 Hours PRN    albuterol sulfate  (90 Base) MCG/ACT inhaler 2 puffs, Inhalation, Every 4 Hours PRN    DULoxetine (CYMBALTA) 30 mg, Oral, Daily    finasteride (PROSCAR) 5 mg, Oral, Daily    mometasone-formoterol (Dulera) 100-5 MCG/ACT inhaler 2 puffs, Inhalation, 2 Times Daily - RT    omeprazole (PRILOSEC) 40 mg, Oral, Daily    polyethylene glycol (MIRALAX) 17 g, Oral, Daily    Probiotic Product (Risaquad-2) capsule capsule 1 capsule,  Oral, Daily    tamsulosin (FLOMAX) 0.8 mg, Oral, Daily    traZODone (DESYREL) 100 mg, Oral, Nightly    vitamin B-12 (CYANOCOBALAMIN) 1,000 mcg, Oral, Daily       Medications Discontinued During This Encounter   Medication Reason    omeprazole (priLOSEC) 40 MG capsule Reorder    mometasone-formoterol (Dulera) 100-5 MCG/ACT inhaler Reorder    DULoxetine (CYMBALTA) 30 MG capsule Reorder    traZODone (DESYREL) 100 MG tablet Reorder    albuterol sulfate  (90 Base) MCG/ACT inhaler Reorder    Probiotic Product (Risaquad-2) capsule capsule Reorder

## 2024-12-20 NOTE — ASSESSMENT & PLAN NOTE
Refilled medications. He will call [facility] for an appointment.    Orders:    DULoxetine (CYMBALTA) 30 MG capsule; Take 1 capsule by mouth Daily for 90 days.    traZODone (DESYREL) 100 MG tablet; Take 1 tablet by mouth Every Night for 90 days.

## 2025-02-05 ENCOUNTER — TELEPHONE (OUTPATIENT)
Dept: UROLOGY | Age: 65
End: 2025-02-05
Payer: COMMERCIAL

## 2025-02-05 NOTE — TELEPHONE ENCOUNTER
Called pt and left him a VM letting him know that he was instructed to stop taking flomax and finasteride at his last follow up appt with Dr. Goldstein on 10/29/24.

## 2025-04-02 ENCOUNTER — TELEPHONE (OUTPATIENT)
Dept: UROLOGY | Age: 65
End: 2025-04-02
Payer: COMMERCIAL

## 2025-04-02 ENCOUNTER — TELEPHONE (OUTPATIENT)
Dept: FAMILY MEDICINE CLINIC | Facility: CLINIC | Age: 65
End: 2025-04-02

## 2025-04-02 DIAGNOSIS — F32.A ANXIETY AND DEPRESSION: Primary | ICD-10-CM

## 2025-04-02 DIAGNOSIS — R30.0 DYSURIA: Primary | ICD-10-CM

## 2025-04-02 DIAGNOSIS — F41.9 ANXIETY AND DEPRESSION: Primary | ICD-10-CM

## 2025-04-02 RX ORDER — DULOXETIN HYDROCHLORIDE 30 MG/1
30 CAPSULE, DELAYED RELEASE ORAL DAILY
Qty: 30 CAPSULE | Refills: 2 | Status: SHIPPED | OUTPATIENT
Start: 2025-04-02 | End: 2025-07-01

## 2025-04-02 RX ORDER — TRAZODONE HYDROCHLORIDE 100 MG/1
100 TABLET ORAL NIGHTLY
Qty: 30 TABLET | Refills: 2 | Status: SHIPPED | OUTPATIENT
Start: 2025-04-02 | End: 2025-07-01

## 2025-04-02 NOTE — TELEPHONE ENCOUNTER
PATIENT CALLED AND SAID HE HAS A KNOT UNDER THE SKIN ON HIS PENIS.  IT ACHES, IS SORE, PAINFUL, RAW, AND BURNING AND GOES AROUND TO HIS BUTT CRACK.  HE SAID IT COULD BE STAFF INFECTION OR CANCER.  HE SAID HE HAD ONE ABOUT 30 YEARS AGO THAT WAS STAFF.  THEY TOLD HIM NOT TO SQUEEZE IT.    HIS TESTICLES FEEL LIKE THEY ARE BEING PULLED DOWN BY A WEIGHT.  HE HAD AN US PREVIOUSLY, AND ONE TESTICLE IS LARGER.  HE SAID IT MIGHT BE CAUSING ISSUES PEEING.  HE IS ON FLOMAX, BUT NOT PEEING WELL.    HE HAD REZUM ON 07/23/24.      HE ASKED FOR AN APPOINTMENT WITH DR. CHAVEZ, OR WITH NITZA, IF SHE CAN GET HIM IN SOONER.    HE SAID IF THE KNOT IS CANCEROUS, IT CAN'T WAIT.    CALL TODAY AND CAN SPEAK TO FORTINO PORTER, SO, [phone].  SHE IS ON HIS RELEASE.  IF CALLING BACK TOMORROW, CALL PATIENT [phone].  HE SAID WE MAY LEAVE MESSAGES ON EITHER VM.

## 2025-04-02 NOTE — TELEPHONE ENCOUNTER
Caller: FORTINO PORTER    Relationship: Emergency Contact    Best call back number: [phone] (TODAY) [phone] (AFTER TODAY)    Requested Prescriptions:   DULoxetine (CYMBALTA) 30 MG capsule        traZODone (DESYREL) 100 MG tablet          Pharmacy where request should be sent: [id number] [address] AT [facility] [phone] [facility] [phone] FX     Last office visit with prescribing clinician: 2024   Last telemedicine visit with prescribing clinician: Visit date not found   Next office visit with prescribing clinician: Visit date not found     Additional details provided by patient: SAYS  IN LIST AND PATIENT'S SIGNIFICANT OTHER SAID THEY ARE NEEDING A REFERRAL TO [facility] FOR PATIENT.    Does the patient have less than a 3 day supply:  [] Yes  [x] No    Would you like a call back once the refill request has been completed: [x] Yes [] No    If the office needs to give you a call back, can they leave a voicemail: [x] Yes [] No    Karen Navarro Rep   25 15:41 EDT

## 2025-04-04 RX ORDER — DOXYCYCLINE 100 MG/1
100 CAPSULE ORAL 2 TIMES DAILY
Qty: 28 CAPSULE | Refills: 0 | Status: SHIPPED | OUTPATIENT
Start: 2025-04-04 | End: 2025-04-18

## 2025-04-04 NOTE — TELEPHONE ENCOUNTER
GEORGIAM for PT to call back regarding Dr. Goldstein message.   Prescription sent to [facility].         PT will need to see NP in 3 weeks for follow up.

## 2025-04-16 ENCOUNTER — TELEPHONE (OUTPATIENT)
Dept: FAMILY MEDICINE CLINIC | Facility: CLINIC | Age: 65
End: 2025-04-16
Payer: COMMERCIAL

## 2025-04-16 DIAGNOSIS — R06.09 DYSPNEA ON EXERTION: Primary | ICD-10-CM

## 2025-04-16 RX ORDER — ALBUTEROL SULFATE 90 UG/1
2 INHALANT RESPIRATORY (INHALATION) EVERY 4 HOURS PRN
Qty: 6.7 G | Refills: 5 | Status: SHIPPED | OUTPATIENT
Start: 2025-04-16

## 2025-04-16 NOTE — TELEPHONE ENCOUNTER
The brand formulations (ProAir HFA, Proventil HFA, Ventolin HFA) are available without prior authorization.
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
## 2025-04-18 ENCOUNTER — OFFICE VISIT (OUTPATIENT)
Dept: UROLOGY | Age: 65
End: 2025-04-18
Payer: COMMERCIAL

## 2025-04-18 VITALS — RESPIRATION RATE: 14 BRPM | BODY MASS INDEX: 22.82 KG/M2 | HEIGHT: 69 IN | WEIGHT: 154.1 LBS

## 2025-04-18 DIAGNOSIS — N48.89 PENILE MASS: ICD-10-CM

## 2025-04-18 DIAGNOSIS — N50.89 SCROTAL SWELLING: Primary | ICD-10-CM

## 2025-04-18 RX ORDER — SUCRALFATE 1 G/1
1 TABLET ORAL 3 TIMES DAILY
COMMUNITY
Start: 2025-01-24

## 2025-05-15 ENCOUNTER — HOSPITAL ENCOUNTER (OUTPATIENT)
Dept: MRI IMAGING | Facility: HOSPITAL | Age: 65
Discharge: HOME | End: 2025-05-15
Payer: COMMERCIAL

## 2025-05-15 ENCOUNTER — HOSPITAL ENCOUNTER (OUTPATIENT)
Dept: ULTRASOUND IMAGING | Facility: HOSPITAL | Age: 65
Discharge: HOME | End: 2025-05-15
Payer: COMMERCIAL

## 2025-05-15 DIAGNOSIS — N48.89 PENILE MASS: ICD-10-CM

## 2025-05-15 DIAGNOSIS — N50.89 SCROTAL SWELLING: ICD-10-CM

## 2025-05-15 PROCEDURE — 25510000001 GADOPICLENOL 0.5 MMOL/ML SOLUTION: Performed by: NURSE PRACTITIONER

## 2025-05-15 PROCEDURE — 72197 MRI PELVIS W/O & W/DYE: CPT

## 2025-05-15 PROCEDURE — 76870 US EXAM SCROTUM: CPT

## 2025-05-15 PROCEDURE — A9573 GADOPICLENOL 0.5 MMOL/ML SOLUTION: HCPCS | Performed by: NURSE PRACTITIONER

## 2025-05-15 RX ADMIN — GADOPICLENOL 6 ML: 485.1 INJECTION INTRAVENOUS at 14:57

## 2025-05-16 DIAGNOSIS — E53.8 LOW SERUM VITAMIN B12: ICD-10-CM

## 2025-05-16 RX ORDER — LANOLIN ALCOHOL/MO/W.PET/CERES
1000 CREAM (GRAM) TOPICAL DAILY
Qty: 30 TABLET | Refills: 0 | Status: SHIPPED | OUTPATIENT
Start: 2025-05-16

## 2025-05-20 ENCOUNTER — RESULTS FOLLOW-UP (OUTPATIENT)
Dept: UROLOGY | Age: 65
End: 2025-05-20
Payer: COMMERCIAL

## 2025-05-21 NOTE — PROGRESS NOTES
Please advise patient that his ultrasound is essentially unchanged from imaging he had done in 2023. F/U if symptoms worsen

## 2025-06-03 ENCOUNTER — OFFICE VISIT (OUTPATIENT)
Dept: GASTROENTEROLOGY | Facility: CLINIC | Age: 65
End: 2025-06-03
Payer: COMMERCIAL

## 2025-06-03 VITALS
SYSTOLIC BLOOD PRESSURE: 119 MMHG | BODY MASS INDEX: 21.77 KG/M2 | OXYGEN SATURATION: 97 % | HEIGHT: 69 IN | HEART RATE: 84 BPM | DIASTOLIC BLOOD PRESSURE: 87 MMHG | WEIGHT: 147 LBS

## 2025-06-03 DIAGNOSIS — K21.9 GASTRO-ESOPHAGEAL REFLUX DISEASE WITHOUT ESOPHAGITIS: Primary | ICD-10-CM

## 2025-06-03 DIAGNOSIS — R10.13 EPIGASTRIC PAIN: ICD-10-CM

## 2025-06-03 NOTE — PROGRESS NOTES
"Chief Complaint    Yoni Aleman is a 65 y.o. male who presents to [facility] for follow-up of epigastric pain, colon polyps in January 2024, Mesa's esophagus.  He returns now overall feeling much better.  He thinks a lot of his abdominal complaints were anxiety driven and that has now been better controlled.  He did have Mesa's esophagus seen by biopsies in December 2024.  Repeat upper endoscopy recommended for 3 years.  Last colonoscopy in January 2024, repeat recommended for 5 years.  He also had CT angiogram showing no significant stenosis of vascular supply to the abdomen.    Result Review :     The following data was reviewed by: Juno Anderson MD on 06/03/2025:             Past Medical History:   Diagnosis Date    Acid reflux     Anxiety     Deep vein blood clot of left lower extremity     Gastric ulcer     Insomnia     Sciatic nerve pain     Scoliosis        Past Surgical History:   Procedure Laterality Date    COLONOSCOPY N/A 1/31/2024    Procedure: COLONOSCOPY WITH HOT SNARE POLYPECTOMY;  Surgeon: Juno Anderson MD;  Location: [facility] ENDOSCOPY;  Service: Gastroenterology;  Laterality: N/A;  COLON POLYP    ENDOSCOPY N/A 1/31/2024    Procedure: ESOPHAGOGASTRODUODENOSCOPY WITH BIOPSIES;  Surgeon: Juno Anderson MD;  Location: [facility] ENDOSCOPY;  Service: Gastroenterology;  Laterality: N/A;  HIATAL HERNIA, BARRETTS ESOPHAGUS    ENDOSCOPY N/A 12/5/2024    Procedure: ESOPHAGOGASTRODUODENOSCOPY WITH BIOPSIES;  Surgeon: Juno Anderson MD;  Location: [facility] ENDOSCOPY;  Service: Gastroenterology;  Laterality: N/A;  HIATAL HERNIA, BARRETTS ESOPHAGUS    SPLENECTOMY      UPPER GASTROINTESTINAL ENDOSCOPY         Social History     Social History Narrative    Not on file       Objective     Vital Signs:   /87   Pulse 84   Ht 175.3 cm (69.02\")   Wt 66.7 kg (147 lb)   SpO2 97%   BMI 21.70 kg/m²     Body mass index is 21.7 kg/m².    Physical " Exam            Assessment and Plan    Diagnoses and all orders for this visit:    1. Gastro-esophageal reflux disease without esophagitis (Primary)    2. Epigastric pain    Overall the patient is much improved and therefore he can discontinue sucralfate as tolerated.  I did recommend he continue taking omeprazole 40 mg daily.  He can try to wean this off as tolerated but likely he will have recurrent symptoms in my opinion.  For his Mesa's esophagus we will plan repeat upper endoscopy in December 2027.              Follow Up   No follow-ups on file.  Patient was given instructions and counseling regarding his condition or for health maintenance advice. Please see specific information pulled into the AVS if appropriate.

## 2025-07-15 RX ORDER — SUCRALFATE 1 G/1
1 TABLET ORAL 3 TIMES DAILY
Qty: 270 TABLET | Refills: 1 | Status: SHIPPED | OUTPATIENT
Start: 2025-07-15

## 2025-07-15 RX ORDER — OMEPRAZOLE 40 MG/1
40 CAPSULE, DELAYED RELEASE ORAL DAILY
Qty: 90 CAPSULE | Refills: 2 | Status: SHIPPED | OUTPATIENT
Start: 2025-07-15 | End: 2025-07-17 | Stop reason: SDUPTHER

## 2025-07-17 ENCOUNTER — OFFICE VISIT (OUTPATIENT)
Dept: FAMILY MEDICINE CLINIC | Facility: CLINIC | Age: 65
End: 2025-07-17
Payer: COMMERCIAL

## 2025-07-17 VITALS
SYSTOLIC BLOOD PRESSURE: 122 MMHG | WEIGHT: 147.5 LBS | HEART RATE: 96 BPM | BODY MASS INDEX: 21.77 KG/M2 | TEMPERATURE: 98.6 F | DIASTOLIC BLOOD PRESSURE: 82 MMHG | OXYGEN SATURATION: 97 %

## 2025-07-17 DIAGNOSIS — F41.9 ANXIETY AND DEPRESSION: ICD-10-CM

## 2025-07-17 DIAGNOSIS — M54.42 CHRONIC MIDLINE LOW BACK PAIN WITH BILATERAL SCIATICA: ICD-10-CM

## 2025-07-17 DIAGNOSIS — K21.9 GASTRO-ESOPHAGEAL REFLUX DISEASE WITHOUT ESOPHAGITIS: ICD-10-CM

## 2025-07-17 DIAGNOSIS — R06.09 DYSPNEA ON EXERTION: ICD-10-CM

## 2025-07-17 DIAGNOSIS — Z91.014 ALPHA-GAL SYNDROME: ICD-10-CM

## 2025-07-17 DIAGNOSIS — F51.01 PRIMARY INSOMNIA: Primary | ICD-10-CM

## 2025-07-17 DIAGNOSIS — G89.29 CHRONIC MIDLINE LOW BACK PAIN WITH BILATERAL SCIATICA: ICD-10-CM

## 2025-07-17 DIAGNOSIS — F32.A ANXIETY AND DEPRESSION: ICD-10-CM

## 2025-07-17 DIAGNOSIS — M54.41 CHRONIC MIDLINE LOW BACK PAIN WITH BILATERAL SCIATICA: ICD-10-CM

## 2025-07-17 DIAGNOSIS — E53.8 LOW SERUM VITAMIN B12: ICD-10-CM

## 2025-07-17 DIAGNOSIS — N40.1 BENIGN PROSTATIC HYPERPLASIA WITH LOWER URINARY TRACT SYMPTOMS, SYMPTOM DETAILS UNSPECIFIED: ICD-10-CM

## 2025-07-17 LAB — VIT B12 BLD-MCNC: 739 PG/ML (ref 211–946)

## 2025-07-17 PROCEDURE — 82607 VITAMIN B-12: CPT | Performed by: FAMILY MEDICINE

## 2025-07-17 PROCEDURE — 86003 ALLG SPEC IGE CRUDE XTRC EA: CPT | Performed by: FAMILY MEDICINE

## 2025-07-17 PROCEDURE — 82785 ASSAY OF IGE: CPT | Performed by: FAMILY MEDICINE

## 2025-07-17 PROCEDURE — 86008 ALLG SPEC IGE RECOMB EA: CPT | Performed by: FAMILY MEDICINE

## 2025-07-17 RX ORDER — ALBUTEROL SULFATE 90 UG/1
2 INHALANT RESPIRATORY (INHALATION) EVERY 4 HOURS PRN
Qty: 6.7 G | Refills: 5 | Status: SHIPPED | OUTPATIENT
Start: 2025-07-17

## 2025-07-17 RX ORDER — ACETAMINOPHEN 500 MG
500 TABLET ORAL EVERY 6 HOURS PRN
Qty: 90 TABLET | Refills: 0 | Status: SHIPPED | OUTPATIENT
Start: 2025-07-17 | End: 2025-08-16

## 2025-07-17 RX ORDER — TRAZODONE HYDROCHLORIDE 100 MG/1
100 TABLET ORAL NIGHTLY
Qty: 90 TABLET | Refills: 1 | Status: SHIPPED | OUTPATIENT
Start: 2025-07-17 | End: 2026-01-13

## 2025-07-17 RX ORDER — OMEPRAZOLE 40 MG/1
40 CAPSULE, DELAYED RELEASE ORAL DAILY
Qty: 90 CAPSULE | Refills: 2 | Status: SHIPPED | OUTPATIENT
Start: 2025-07-17

## 2025-07-17 RX ORDER — DULOXETIN HYDROCHLORIDE 30 MG/1
30 CAPSULE, DELAYED RELEASE ORAL DAILY
Qty: 90 CAPSULE | Refills: 1 | Status: SHIPPED | OUTPATIENT
Start: 2025-07-17 | End: 2026-01-13

## 2025-07-17 RX ORDER — LANOLIN ALCOHOL/MO/W.PET/CERES
1000 CREAM (GRAM) TOPICAL DAILY
Qty: 90 TABLET | Refills: 1 | Status: SHIPPED | OUTPATIENT
Start: 2025-07-17 | End: 2026-01-13

## 2025-07-17 RX ORDER — TAMSULOSIN HYDROCHLORIDE 0.4 MG/1
2 CAPSULE ORAL DAILY
Qty: 180 CAPSULE | Refills: 1 | Status: SHIPPED | OUTPATIENT
Start: 2025-07-17 | End: 2026-01-13

## 2025-07-17 NOTE — ASSESSMENT & PLAN NOTE
Refilled omeprazole  Orders:    omeprazole (priLOSEC) 40 MG capsule; Take 1 capsule by mouth Daily.

## 2025-07-17 NOTE — ASSESSMENT & PLAN NOTE
Refilled Flomax  Orders:    tamsulosin (FLOMAX) 0.4 MG capsule 24 hr capsule; Take 2 capsules by mouth Daily for 180 days.

## 2025-07-17 NOTE — ASSESSMENT & PLAN NOTE
Refill duloxetine, this also worked pretty well for him while he was on it    Orders:    DULoxetine (CYMBALTA) 30 MG capsule; Take 1 capsule by mouth Daily for 180 days.    traZODone (DESYREL) 100 MG tablet; Take 1 tablet by mouth Every Night for 180 days.

## 2025-07-17 NOTE — PROGRESS NOTES
Chief Complaint  Insomnia, Anxiety, alpha gal, and Fatigue    Subjective      Yoni Aleman is a 65 y.o. male who presents to [facility]     Insomnia.  He is on trazodone 100 mg nightly. Has had some major troubles sleeping lately but he's been out of the trazodone and has not felt well rested lately. But he's been off it for a week and his symptoms are much worse with that. When he's on the trazodone regularly he felt like he was     Anxiety and depression.  He is on duloxetine 30 mg daily. He follows [facility] for counseling - he feels like they weren't doing much so he does not see them anymore. Needs a refill of the duloxetine, he says it worked well.    He was told he has alpha gal and was told to avoid animal products. He wants to be retested.    Objective   Vital Signs:   Vitals:    07/17/25 1539   BP: 122/82   Pulse: 96   Temp: 98.6 °F (37 °C)   SpO2: 97%   Weight: 66.9 kg (147 lb 8 oz)     Body mass index is 21.77 kg/m².    Wt Readings from Last 3 Encounters:   07/17/25 66.9 kg (147 lb 8 oz)   06/03/25 66.7 kg (147 lb)   04/18/25 69.9 kg (154 lb 1.6 oz)     BP Readings from Last 3 Encounters:   07/17/25 122/82   06/03/25 119/87   12/20/24 160/90       Health Maintenance   Topic Date Due    ZOSTER VACCINE (1 of 2) Never done    INFLUENZA VACCINE  10/01/2025    ANNUAL PHYSICAL  11/12/2025    GASTROSCOPY (EGD)  12/05/2025    COLORECTAL CANCER SCREENING  01/31/2027    TDAP/TD VACCINES (2 - Td or Tdap) 02/05/2027    HEPATITIS C SCREENING  Completed    COVID-19 Vaccine  Completed    Pneumococcal Vaccine 50+  Completed       Physical Exam  Vitals and nursing note reviewed.   Constitutional:       General: He is not in acute distress.  Cardiovascular:      Rate and Rhythm: Normal rate and regular rhythm.      Heart sounds: Normal heart sounds.   Pulmonary:      Effort: Pulmonary effort is normal. No respiratory distress.      Breath sounds: Normal breath sounds.   Neurological:       Mental Status: He is alert.   Psychiatric:         Mood and Affect: Mood normal.         Behavior: Behavior normal.          Result Review :  The following data was reviewed by: Rodrick Saldana MD on 07/17/2025:         Procedures          Assessment & Plan  Primary insomnia  Refilled trazodone, this seemed to work pretty well for him when he was on it  Orders:    traZODone (DESYREL) 100 MG tablet; Take 1 tablet by mouth Every Night for 180 days.    Anxiety and depression  Refill duloxetine, this also worked pretty well for him while he was on it    Orders:    DULoxetine (CYMBALTA) 30 MG capsule; Take 1 capsule by mouth Daily for 180 days.    traZODone (DESYREL) 100 MG tablet; Take 1 tablet by mouth Every Night for 180 days.    Dyspnea on exertion  Refilled albuterol  Orders:    albuterol sulfate HFA (ProAir HFA) 108 (90 Base) MCG/ACT inhaler; Inhale 2 puffs Every 4 (Four) Hours As Needed for Wheezing or Shortness of Air.    Low serum vitamin B12  Recheck B12 levels  Orders:    vitamin B-12 (CYANOCOBALAMIN) 1000 MCG tablet; Take 1 tablet by mouth Daily for 180 days.    Vitamin B12    Benign prostatic hyperplasia with lower urinary tract symptoms, symptom details unspecified  Refilled Flomax  Orders:    tamsulosin (FLOMAX) 0.4 MG capsule 24 hr capsule; Take 2 capsules by mouth Daily for 180 days.    Gastro-esophageal reflux disease without esophagitis  Refilled omeprazole  Orders:    omeprazole (priLOSEC) 40 MG capsule; Take 1 capsule by mouth Daily.    Alpha-gal syndrome  He wanted to be rechecked for alpha gal.  I discussed that with a previous positive alpha gal test last year, it was likely to still be positive.  He understood that but he wanted to retest to make sure so I ordered that test.  Orders:    Alpha-Gal IgE Panel         BMI is within normal parameters. No other follow-up for BMI required.         FOLLOW UP  Return if symptoms worsen or fail to improve.  Patient was given instructions and  counseling regarding his condition or for health maintenance advice. Please see specific information pulled into the AVS if appropriate.       Rodrick Saldana MD  07/17/25  16:01 EDT    CURRENT & DISCONTINUED MEDICATIONS  Current Outpatient Medications   Medication Instructions    albuterol sulfate HFA (ProAir HFA) 108 (90 Base) MCG/ACT inhaler 2 puffs, Inhalation, Every 4 Hours PRN    DULoxetine (CYMBALTA) 30 mg, Oral, Daily    mometasone-formoterol (Dulera) 100-5 MCG/ACT inhaler 2 puffs, Inhalation, 2 Times Daily - RT    omeprazole (PRILOSEC) 40 mg, Oral, Daily    polyethylene glycol (MIRALAX) 17 g, Oral, Daily    sucralfate (CARAFATE) 1 g, Oral, 3 Times Daily    tamsulosin (FLOMAX) 0.8 mg, Oral, Daily    traZODone (DESYREL) 100 mg, Oral, Nightly    vitamin B-12 (CYANOCOBALAMIN) 1,000 mcg, Oral, Daily       Medications Discontinued During This Encounter   Medication Reason    tamsulosin (FLOMAX) 0.4 MG capsule 24 hr capsule Reorder    DULoxetine (CYMBALTA) 30 MG capsule Reorder    traZODone (DESYREL) 100 MG tablet Reorder    albuterol sulfate HFA (ProAir HFA) 108 (90 Base) MCG/ACT inhaler Reorder    vitamin B-12 (CYANOCOBALAMIN) 1000 MCG tablet Reorder    omeprazole (priLOSEC) 40 MG capsule Reorder

## 2025-07-17 NOTE — PROGRESS NOTES
..  Venipuncture Blood Specimen Collection  Venipuncture performed in LT arm by Siri Saldana MA with good hemostasis. Patient tolerated the procedure well without complications.   07/17/25   Siri Saldana MA

## 2025-07-22 LAB
ALPHA-GAL IGE QN: 1.85 KU/L
BEEF IGE QN: 0.85 KU/L
IGE SERPL-ACNC: 152 IU/ML (ref 6–495)
LAMB IGE QN: 0.12 KU/L
PORK IGE QN: 0.17 KU/L
SERVICE CMNT-IMP: ABNORMAL

## (undated) DEVICE — SOLIDIFIER LIQLOC PLS 1500CC BT

## (undated) DEVICE — LINER SURG CANSTR SXN S/RIGD 1500CC

## (undated) DEVICE — BLCK/BITE BLOX WO/DENTL/RIM W/STRAP 54F

## (undated) DEVICE — SINGLE-USE BIOPSY FORCEPS: Brand: RADIAL JAW 4

## (undated) DEVICE — CONN JET HYDRA H20 AUXILIARY DISP

## (undated) DEVICE — THE SINGLE USE ETRAP – POLYP TRAP IS USED FOR SUCTION RETRIEVAL OF ENDOSCOPICALLY REMOVED POLYPS.: Brand: ETRAP

## (undated) DEVICE — Device: Brand: DEFENDO AIR/WATER/SUCTION AND BIOPSY VALVE

## (undated) DEVICE — Device

## (undated) DEVICE — SOL IRRG H2O PL/BG 1000ML STRL

## (undated) DEVICE — PAD GRND REM POLYHESIVE A/ DISP

## (undated) DEVICE — SNAR POLYP CAPTIFLEX XS/OVL 11X2.4MM 240CM 1P/U